# Patient Record
Sex: MALE | Race: WHITE | NOT HISPANIC OR LATINO | Employment: FULL TIME | URBAN - METROPOLITAN AREA
[De-identification: names, ages, dates, MRNs, and addresses within clinical notes are randomized per-mention and may not be internally consistent; named-entity substitution may affect disease eponyms.]

---

## 2018-01-17 ENCOUNTER — TRANSFERRED RECORDS (OUTPATIENT)
Dept: HEALTH INFORMATION MANAGEMENT | Facility: CLINIC | Age: 63
End: 2018-01-17

## 2018-01-19 DIAGNOSIS — F51.3 SLEEP WALKING DISORDER: ICD-10-CM

## 2018-01-19 DIAGNOSIS — R06.83 SNORING: ICD-10-CM

## 2018-01-19 DIAGNOSIS — E66.9 OBESITY: Primary | ICD-10-CM

## 2018-01-30 ENCOUNTER — TELEPHONE (OUTPATIENT)
Dept: SLEEP MEDICINE | Facility: CLINIC | Age: 63
End: 2018-01-30

## 2018-01-30 NOTE — TELEPHONE ENCOUNTER
Called patient and left another voice mail today. He needs to be scheduled with Dr. Hidalgo due to his diagnosis of sleep walking. Dr. Hidalgo is the person who he needs to see. Patient has been contacted two or three times.     Farrah Willson

## 2019-10-17 ENCOUNTER — HOSPITAL ENCOUNTER (EMERGENCY)
Facility: CLINIC | Age: 64
Discharge: HOME | End: 2019-10-17
Attending: EMERGENCY MEDICINE | Admitting: EMERGENCY MEDICINE
Payer: COMMERCIAL

## 2019-10-17 ENCOUNTER — APPOINTMENT (OUTPATIENT)
Dept: GENERAL RADIOLOGY | Facility: CLINIC | Age: 64
End: 2019-10-17
Attending: EMERGENCY MEDICINE
Payer: COMMERCIAL

## 2019-10-17 VITALS
TEMPERATURE: 97.8 F | HEIGHT: 74 IN | SYSTOLIC BLOOD PRESSURE: 151 MMHG | BODY MASS INDEX: 29.52 KG/M2 | DIASTOLIC BLOOD PRESSURE: 89 MMHG | RESPIRATION RATE: 9 BRPM | HEART RATE: 60 BPM | OXYGEN SATURATION: 97 % | WEIGHT: 230 LBS

## 2019-10-17 DIAGNOSIS — R42 DIZZINESS: ICD-10-CM

## 2019-10-17 DIAGNOSIS — R42 LIGHTHEADEDNESS: ICD-10-CM

## 2019-10-17 LAB
ALBUMIN SERPL-MCNC: 4.1 G/DL (ref 3.4–5)
ALP SERPL-CCNC: 84 U/L (ref 40–150)
ALT SERPL W P-5'-P-CCNC: 41 U/L (ref 0–70)
ANION GAP SERPL CALCULATED.3IONS-SCNC: 6 MMOL/L (ref 3–14)
AST SERPL W P-5'-P-CCNC: 22 U/L (ref 0–45)
BASOPHILS # BLD AUTO: 0 10E9/L (ref 0–0.2)
BASOPHILS NFR BLD AUTO: 0.2 %
BILIRUB SERPL-MCNC: 0.9 MG/DL (ref 0.2–1.3)
BUN SERPL-MCNC: 17 MG/DL (ref 7–30)
CALCIUM SERPL-MCNC: 9.3 MG/DL (ref 8.5–10.1)
CHLORIDE SERPL-SCNC: 106 MMOL/L (ref 94–109)
CO2 SERPL-SCNC: 26 MMOL/L (ref 20–32)
CREAT SERPL-MCNC: 0.84 MG/DL (ref 0.66–1.25)
DIFFERENTIAL METHOD BLD: NORMAL
EOSINOPHIL # BLD AUTO: 0.1 10E9/L (ref 0–0.7)
EOSINOPHIL NFR BLD AUTO: 1.2 %
ERYTHROCYTE [DISTWIDTH] IN BLOOD BY AUTOMATED COUNT: 12.2 % (ref 10–15)
GFR SERPL CREATININE-BSD FRML MDRD: >90 ML/MIN/{1.73_M2}
GLUCOSE SERPL-MCNC: 160 MG/DL (ref 70–99)
HCT VFR BLD AUTO: 49 % (ref 40–53)
HGB BLD-MCNC: 17.2 G/DL (ref 13.3–17.7)
IMM GRANULOCYTES # BLD: 0 10E9/L (ref 0–0.4)
IMM GRANULOCYTES NFR BLD: 0.2 %
INTERPRETATION ECG - MUSE: NORMAL
LYMPHOCYTES # BLD AUTO: 2.2 10E9/L (ref 0.8–5.3)
LYMPHOCYTES NFR BLD AUTO: 33.7 %
MCH RBC QN AUTO: 32.9 PG (ref 26.5–33)
MCHC RBC AUTO-ENTMCNC: 35.1 G/DL (ref 31.5–36.5)
MCV RBC AUTO: 94 FL (ref 78–100)
MONOCYTES # BLD AUTO: 0.5 10E9/L (ref 0–1.3)
MONOCYTES NFR BLD AUTO: 6.8 %
NEUTROPHILS # BLD AUTO: 3.9 10E9/L (ref 1.6–8.3)
NEUTROPHILS NFR BLD AUTO: 57.9 %
NRBC # BLD AUTO: 0 10*3/UL
NRBC BLD AUTO-RTO: 0 /100
NT-PROBNP SERPL-MCNC: 26 PG/ML (ref 0–900)
PLATELET # BLD AUTO: 251 10E9/L (ref 150–450)
POTASSIUM SERPL-SCNC: 3.8 MMOL/L (ref 3.4–5.3)
PROT SERPL-MCNC: 7.7 G/DL (ref 6.8–8.8)
RBC # BLD AUTO: 5.23 10E12/L (ref 4.4–5.9)
SODIUM SERPL-SCNC: 138 MMOL/L (ref 133–144)
TROPONIN I SERPL-MCNC: <0.015 UG/L (ref 0–0.04)
WBC # BLD AUTO: 6.6 10E9/L (ref 4–11)

## 2019-10-17 PROCEDURE — 80053 COMPREHEN METABOLIC PANEL: CPT | Performed by: EMERGENCY MEDICINE

## 2019-10-17 PROCEDURE — 96360 HYDRATION IV INFUSION INIT: CPT

## 2019-10-17 PROCEDURE — 84484 ASSAY OF TROPONIN QUANT: CPT | Performed by: EMERGENCY MEDICINE

## 2019-10-17 PROCEDURE — 83880 ASSAY OF NATRIURETIC PEPTIDE: CPT | Performed by: EMERGENCY MEDICINE

## 2019-10-17 PROCEDURE — 25000128 H RX IP 250 OP 636: Performed by: EMERGENCY MEDICINE

## 2019-10-17 PROCEDURE — 71046 X-RAY EXAM CHEST 2 VIEWS: CPT

## 2019-10-17 PROCEDURE — 85025 COMPLETE CBC W/AUTO DIFF WBC: CPT | Performed by: EMERGENCY MEDICINE

## 2019-10-17 PROCEDURE — 99285 EMERGENCY DEPT VISIT HI MDM: CPT | Mod: 25

## 2019-10-17 PROCEDURE — 93005 ELECTROCARDIOGRAM TRACING: CPT

## 2019-10-17 RX ADMIN — SODIUM CHLORIDE 1000 ML: 9 INJECTION, SOLUTION INTRAVENOUS at 10:50

## 2019-10-17 ASSESSMENT — ENCOUNTER SYMPTOMS
DYSURIA: 0
ABDOMINAL PAIN: 0
DIZZINESS: 1
NUMBNESS: 0
LIGHT-HEADEDNESS: 1
NERVOUS/ANXIOUS: 1
PALPITATIONS: 0
VOMITING: 0
BLOOD IN STOOL: 0
FREQUENCY: 0
DIARRHEA: 0
FEVER: 0
HEMATURIA: 0
DIFFICULTY URINATING: 0

## 2019-10-17 ASSESSMENT — MIFFLIN-ST. JEOR: SCORE: 1898.02

## 2019-10-17 NOTE — ED NOTES
Pt hadto go to the bathroom - wife walked with him in the [place]. Pt stated he would be fine to walk to the bathroom, he came in independently  and has shoes on.

## 2019-10-17 NOTE — ED AVS SNAPSHOT
Emergency Department  [address] [place] [id number]  Phone:  [phone]  Fax:  [phone]                                    Castillo Alba   MRN: [id number]    Department:   Emergency Department   Date of Visit:  10/17/2019           After Visit Summary Signature Page    I have received my discharge instructions, and my questions have been answered. I have discussed any challenges I see with this plan with the nurse or doctor.    ..........................................................................................................................................  Patient/Patient Representative Signature      ..........................................................................................................................................  Patient Representative Print Name and Relationship to Patient    ..................................................               ................................................  Date                                   Time    ..........................................................................................................................................  Reviewed by Signature/Title    ...................................................              ..............................................  Date                                               Time          22EPIC Rev 08/18

## 2019-10-17 NOTE — ED NOTES
Pt stated he has been dealing with this dizzy almost faint feeling on and off for the last year and it has gotten worse over the last 3 months.

## 2019-10-17 NOTE — ED PROVIDER NOTES
History     Chief Complaint:  Dizziness    The history is provided by the patient.      Castillo Alba is a 64 year old male who presents for dizziness. The patient reports that for the last year he has had dizziness, and feeling faint and going to fall over. He denies the room spinning. He states that for the last 3 months he has had increased frequency and for the last couple weeks he has had the episodes every other day. The patient's wife states that these episodes have been getting a lot worse for the last 5 days. These episodes last for around a hour at a time. He denies any palpitations, chest pain, numbing, or tingling before or during this episodes. Laying down makes him feel better. He states that he is starting a new job and he has had a lot more anxiety. He denies any fever, abdominal pain, blood in his stool, diarrhea, vomiting, urinary abnormalities, and rashes.     Allergies:  No known drug allergies.    Medications:    The patient is not currently taking any prescribed medications.    Past Medical History:    History reviewed.  No significant past medical history.     Past Surgical History:    History reviewed. No pertinent past surgical history.    Family History:    History reviewed. No pertinent family history.    Social History:  Patient is   PCP: Physician No Ref-Primary     Review of Systems   Constitutional: Negative for fever.   Cardiovascular: Negative for chest pain and palpitations.   Gastrointestinal: Negative for abdominal pain, blood in stool, diarrhea and vomiting.   Genitourinary: Negative for difficulty urinating, dysuria, frequency and hematuria.   Skin: Negative for rash.   Neurological: Positive for dizziness and light-headedness. Negative for numbness.   Psychiatric/Behavioral: The patient is nervous/anxious.    All other systems reviewed and are negative.    Physical Exam   First Vitals:  Patient Vitals for the past 24 hrs:   BP Temp Temp src Pulse Heart Rate Resp  "SpO2 Height Weight   10/17/19 1158 (!) 151/89 -- -- 60 -- -- -- -- --   10/17/19 1152 -- -- -- -- -- -- 97 % -- --   10/17/19 1150 (!) 151/89 -- -- 60 -- -- -- -- --   10/17/19 1101 (!) 143/74 -- -- 66 57 9 95 % -- --   10/17/19 1100 (!) 143/74 -- -- 66 58 10 93 % -- --   10/17/19 1000 (!) 156/87 -- -- 60 -- -- 97 % -- --   10/17/19 0924 (!) 165/94 97.8  F (36.6  C) Oral 83 -- 18 100 % 1.88 m (6' 2\") 104.3 kg (230 lb)     Physical Exam  Vitals: reviewed by me  General: Pt seen on [facility], cooperative, and alert to conversation  Eyes: Tracking well, clear conjunctiva BL  ENT: MMM, midline trachea.   Lungs:   No tachypnea, no accessory muscle use. No respiratory distress.   CV: Rate as above, regular rhythm.  No MGR on auscultation   Abd: Soft, non tender, no guarding, no rebound. Non distended  MSK: no peripheral edema or joint effusion.  No evidence of trauma  Skin: No rash, normal turgor and temperature  Neuro: Clear speech and no facial droop.  Psych: Not RIS, no e/o AH/VH      Emergency Department Course   ECG:  @ 0929  Indication: Dizziness  Vent. Rate 105 bpm. CO interval 174 ms. QRS duration 82 ms. QT/QTc 380/502 ms. P-R-T axis 49 25 33.   Sinus tachycardia with frequent premature ventricular complexes in a patter of bigeminy. Low voltage QRS. Septal infarct, age undetermined. Bigeminy. Abnormal ECG.    Read by Dr. Martinez.    Imaging:  Radiographic findings were communicated with the patient who voiced understanding of the findings.  XR chest 2 views:  Enlarged cardiomediastinal silhouette with tortuous  descending thoracic aorta. Bibasilar subsegmental atelectasis. Mild  pulmonary vascular congestion. Multilevel degenerative change is seen  in the spine. Per radiology read.    Laboratory:  CBC:  WBC 6.6, HGB 17.2,   CMP: Glucose 160 high, o/w WNL. (Creatinine 0.84)  Troponin: <0.015  BNP: 26    Interventions:  1050: NS 1L IV    Emergency Department Course:  9:38 AM Nursing notes " and vitals reviewed.  I performed an exam of the patient as documented above.     11:45 AM Findings and plan explained to the patient. Patient discharged home with instructions regarding supportive care, medications, and reasons to return. The importance of close follow-up was reviewed.     Impression & Plan      Medical Decision Making:  Castillo Alba is a very pleasant 64 year old male who presents to the emergency room with what appears to be dizziness of unknown origin. He has had this for about a year, worse for the last 3 months, and he has a normal workup here in the ER. He has a normal hemoglobin, no evidence of dehydration, no vertigo, and a negative cardiac workup as well. He has had no events on the cardiac monitors here. He has a normal neurologic exam, and he thinks this may be related to sleep apnea. I do think he is stable for outpatient management, however I underscored how no clear cause was found and he does have to follow with a regular physician at regular intervals.     Diagnosis:    ICD-10-CM    1. Dizziness R42    2. Lightheadedness R42        Disposition:  discharged to home    I, Bradley Aasen, am serving as a scribe on 10/17/2019 at 9:38 AM to personally document services performed by Rusty Martinez MD based on my observations and the provider's statements to me.          Rusty Martinez MD  10/17/19 1549       Rusty Martinez MD  10/17/19 1540

## 2019-10-17 NOTE — DISCHARGE INSTRUCTIONS
As we discussed, no clear cause for your lightheadedness was found today.  Please do go to the primary care doctor given to you in this paperwork, if you do not have your own.  It is very important that you follow with her regular doctor next week, as no clear cause for your dizziness was found.  Please come back to the ER with any other concerns.

## 2022-12-12 ENCOUNTER — HOSPITAL ENCOUNTER (OUTPATIENT)
Facility: CLINIC | Age: 67
End: 2022-12-12
Attending: ORTHOPAEDIC SURGERY | Admitting: ORTHOPAEDIC SURGERY
Payer: COMMERCIAL

## 2023-11-13 ENCOUNTER — HOSPITAL ENCOUNTER (OUTPATIENT)
Facility: CLINIC | Age: 68
End: 2023-11-13
Attending: ORTHOPAEDIC SURGERY | Admitting: ORTHOPAEDIC SURGERY
Payer: COMMERCIAL

## 2024-02-18 ENCOUNTER — APPOINTMENT (OUTPATIENT)
Dept: CT IMAGING | Facility: CLINIC | Age: 69
DRG: 391 | End: 2024-02-18
Attending: EMERGENCY MEDICINE
Payer: COMMERCIAL

## 2024-02-18 ENCOUNTER — HOSPITAL ENCOUNTER (INPATIENT)
Facility: CLINIC | Age: 69
LOS: 7 days | Discharge: HOME | DRG: 391 | End: 2024-02-25
Attending: EMERGENCY MEDICINE | Admitting: INTERNAL MEDICINE
Payer: COMMERCIAL

## 2024-02-18 DIAGNOSIS — Z22.1 CLOSTRIDIUM DIFFICILE CARRIER: ICD-10-CM

## 2024-02-18 DIAGNOSIS — A41.9 ACUTE SEPSIS (H): ICD-10-CM

## 2024-02-18 DIAGNOSIS — K57.81: Primary | ICD-10-CM

## 2024-02-18 DIAGNOSIS — K57.20 DIVERTICULITIS OF COLON WITH PERFORATION: ICD-10-CM

## 2024-02-18 DIAGNOSIS — K57.20 ABSCESS OF SIGMOID COLON DUE TO DIVERTICULITIS: ICD-10-CM

## 2024-02-18 PROBLEM — R82.81 PYURIA: Status: ACTIVE | Noted: 2024-02-18

## 2024-02-18 PROBLEM — F17.200 TOBACCO USE DISORDER: Status: ACTIVE | Noted: 2024-02-18

## 2024-02-18 PROBLEM — E87.1 HYPONATREMIA: Status: ACTIVE | Noted: 2024-02-18

## 2024-02-18 PROBLEM — R82.81 PYURIA: Chronic | Status: ACTIVE | Noted: 2024-02-18

## 2024-02-18 PROBLEM — D75.839 THROMBOCYTHEMIA: Status: ACTIVE | Noted: 2024-02-18

## 2024-02-18 PROBLEM — D72.829 LEUKOCYTOSIS: Status: ACTIVE | Noted: 2024-02-18

## 2024-02-18 LAB
ADV 40+41 DNA STL QL NAA+NON-PROBE: NEGATIVE
ALBUMIN SERPL BCG-MCNC: 3.1 G/DL (ref 3.5–5.2)
ALBUMIN UR-MCNC: 200 MG/DL
ALP SERPL-CCNC: 146 U/L (ref 40–150)
ALT SERPL W P-5'-P-CCNC: 30 U/L (ref 0–70)
ANION GAP SERPL CALCULATED.3IONS-SCNC: 12 MMOL/L (ref 7–15)
APPEARANCE UR: ABNORMAL
AST SERPL W P-5'-P-CCNC: 29 U/L (ref 0–45)
ASTRO TYP 1-8 RNA STL QL NAA+NON-PROBE: NEGATIVE
BASOPHILS # BLD AUTO: 0 10E3/UL (ref 0–0.2)
BASOPHILS NFR BLD AUTO: 0 %
BILIRUB SERPL-MCNC: 0.6 MG/DL
BILIRUB UR QL STRIP: ABNORMAL
BUN SERPL-MCNC: 10.2 MG/DL (ref 8–23)
C CAYETANENSIS DNA STL QL NAA+NON-PROBE: NEGATIVE
C DIFF GDH STL QL IA: NEGATIVE
C DIFF TOX A+B STL QL IA: NEGATIVE
C DIFF TOX B STL QL: POSITIVE
CALCIUM SERPL-MCNC: 8.8 MG/DL (ref 8.8–10.2)
CAMPYLOBACTER DNA SPEC NAA+PROBE: NEGATIVE
CHLORIDE SERPL-SCNC: 92 MMOL/L (ref 98–107)
COLOR UR AUTO: ABNORMAL
CREAT SERPL-MCNC: 0.89 MG/DL (ref 0.67–1.17)
CRYPTOSP DNA STL QL NAA+NON-PROBE: NEGATIVE
DEPRECATED HCO3 PLAS-SCNC: 27 MMOL/L (ref 22–29)
E COLI O157 DNA STL QL NAA+NON-PROBE: NORMAL
E HISTOLYT DNA STL QL NAA+NON-PROBE: NEGATIVE
EAEC ASTA GENE ISLT QL NAA+PROBE: NEGATIVE
EC STX1+STX2 GENES STL QL NAA+NON-PROBE: NEGATIVE
EGFRCR SERPLBLD CKD-EPI 2021: >90 ML/MIN/1.73M2
EOSINOPHIL # BLD AUTO: 0 10E3/UL (ref 0–0.7)
EOSINOPHIL NFR BLD AUTO: 0 %
EPEC EAE GENE STL QL NAA+NON-PROBE: NEGATIVE
ERYTHROCYTE [DISTWIDTH] IN BLOOD BY AUTOMATED COUNT: 12.7 % (ref 10–15)
ETEC LTA+ST1A+ST1B TOX ST NAA+NON-PROBE: NEGATIVE
G LAMBLIA DNA STL QL NAA+NON-PROBE: NEGATIVE
GLUCOSE SERPL-MCNC: 175 MG/DL (ref 70–99)
GLUCOSE UR STRIP-MCNC: 50 MG/DL
HCT VFR BLD AUTO: 41.4 % (ref 40–53)
HGB BLD-MCNC: 14 G/DL (ref 13.3–17.7)
HGB UR QL STRIP: ABNORMAL
HOLD SPECIMEN: NORMAL
HYALINE CASTS: 67 /LPF
IMM GRANULOCYTES # BLD: 0.1 10E3/UL
IMM GRANULOCYTES NFR BLD: 1 %
KETONES UR STRIP-MCNC: ABNORMAL MG/DL
LACTATE SERPL-SCNC: 1.2 MMOL/L (ref 0.7–2)
LEUKOCYTE ESTERASE UR QL STRIP: ABNORMAL
LYMPHOCYTES # BLD AUTO: 2.1 10E3/UL (ref 0.8–5.3)
LYMPHOCYTES NFR BLD AUTO: 13 %
MCH RBC QN AUTO: 29.4 PG (ref 26.5–33)
MCHC RBC AUTO-ENTMCNC: 33.8 G/DL (ref 31.5–36.5)
MCV RBC AUTO: 87 FL (ref 78–100)
MONOCYTES # BLD AUTO: 1.3 10E3/UL (ref 0–1.3)
MONOCYTES NFR BLD AUTO: 8 %
MUCOUS THREADS #/AREA URNS LPF: PRESENT /LPF
NEUTROPHILS # BLD AUTO: 12.4 10E3/UL (ref 1.6–8.3)
NEUTROPHILS NFR BLD AUTO: 78 %
NITRATE UR QL: NEGATIVE
NOROVIRUS GI+II RNA STL QL NAA+NON-PROBE: NEGATIVE
NRBC # BLD AUTO: 0 10E3/UL
NRBC BLD AUTO-RTO: 0 /100
P SHIGELLOIDES DNA STL QL NAA+NON-PROBE: NEGATIVE
PH UR STRIP: 5.5 [PH] (ref 5–7)
PLATELET # BLD AUTO: 581 10E3/UL (ref 150–450)
POTASSIUM SERPL-SCNC: 3.5 MMOL/L (ref 3.4–5.3)
PROT SERPL-MCNC: 7.2 G/DL (ref 6.4–8.3)
RADIOLOGIST FLAGS: ABNORMAL
RBC # BLD AUTO: 4.76 10E6/UL (ref 4.4–5.9)
RBC URINE: 35 /HPF
RVA RNA STL QL NAA+NON-PROBE: NEGATIVE
SALMONELLA SP RPOD STL QL NAA+PROBE: NEGATIVE
SAPO I+II+IV+V RNA STL QL NAA+NON-PROBE: NEGATIVE
SHIGELLA SP+EIEC IPAH ST NAA+NON-PROBE: NEGATIVE
SODIUM SERPL-SCNC: 131 MMOL/L (ref 135–145)
SP GR UR STRIP: 1.03 (ref 1–1.03)
SQUAMOUS EPITHELIAL: 5 /HPF
UROBILINOGEN UR STRIP-MCNC: 8 MG/DL
V CHOLERAE DNA SPEC QL NAA+PROBE: NEGATIVE
VIBRIO DNA SPEC NAA+PROBE: NEGATIVE
WBC # BLD AUTO: 16 10E3/UL (ref 4–11)
WBC URINE: 42 /HPF
Y ENTEROCOL DNA STL QL NAA+PROBE: NEGATIVE

## 2024-02-18 PROCEDURE — 87507 IADNA-DNA/RNA PROBE TQ 12-25: CPT | Performed by: EMERGENCY MEDICINE

## 2024-02-18 PROCEDURE — 74177 CT ABD & PELVIS W/CONTRAST: CPT

## 2024-02-18 PROCEDURE — 85025 COMPLETE CBC W/AUTO DIFF WBC: CPT | Performed by: SOCIAL WORKER

## 2024-02-18 PROCEDURE — 87040 BLOOD CULTURE FOR BACTERIA: CPT | Performed by: EMERGENCY MEDICINE

## 2024-02-18 PROCEDURE — 250N000011 HC RX IP 250 OP 636: Performed by: EMERGENCY MEDICINE

## 2024-02-18 PROCEDURE — 85025 COMPLETE CBC W/AUTO DIFF WBC: CPT | Performed by: EMERGENCY MEDICINE

## 2024-02-18 PROCEDURE — 87086 URINE CULTURE/COLONY COUNT: CPT | Performed by: EMERGENCY MEDICINE

## 2024-02-18 PROCEDURE — 120N000001 HC R&B MED SURG/OB

## 2024-02-18 PROCEDURE — 258N000003 HC RX IP 258 OP 636: Performed by: INTERNAL MEDICINE

## 2024-02-18 PROCEDURE — 80053 COMPREHEN METABOLIC PANEL: CPT | Performed by: SOCIAL WORKER

## 2024-02-18 PROCEDURE — 80053 COMPREHEN METABOLIC PANEL: CPT | Performed by: EMERGENCY MEDICINE

## 2024-02-18 PROCEDURE — 87324 CLOSTRIDIUM AG IA: CPT | Performed by: EMERGENCY MEDICINE

## 2024-02-18 PROCEDURE — 250N000011 HC RX IP 250 OP 636: Performed by: INTERNAL MEDICINE

## 2024-02-18 PROCEDURE — 250N000009 HC RX 250: Performed by: EMERGENCY MEDICINE

## 2024-02-18 PROCEDURE — 96360 HYDRATION IV INFUSION INIT: CPT

## 2024-02-18 PROCEDURE — 83605 ASSAY OF LACTIC ACID: CPT | Performed by: EMERGENCY MEDICINE

## 2024-02-18 PROCEDURE — 36415 COLL VENOUS BLD VENIPUNCTURE: CPT | Performed by: SOCIAL WORKER

## 2024-02-18 PROCEDURE — 87493 C DIFF AMPLIFIED PROBE: CPT | Performed by: EMERGENCY MEDICINE

## 2024-02-18 PROCEDURE — 36415 COLL VENOUS BLD VENIPUNCTURE: CPT | Performed by: EMERGENCY MEDICINE

## 2024-02-18 PROCEDURE — 258N000003 HC RX IP 258 OP 636: Performed by: EMERGENCY MEDICINE

## 2024-02-18 PROCEDURE — 81001 URINALYSIS AUTO W/SCOPE: CPT | Performed by: EMERGENCY MEDICINE

## 2024-02-18 PROCEDURE — 99285 EMERGENCY DEPT VISIT HI MDM: CPT | Mod: 25

## 2024-02-18 PROCEDURE — 99223 1ST HOSP IP/OBS HIGH 75: CPT | Performed by: INTERNAL MEDICINE

## 2024-02-18 RX ORDER — HYDROMORPHONE HCL IN WATER/PF 6 MG/30 ML
0.2 PATIENT CONTROLLED ANALGESIA SYRINGE INTRAVENOUS
Status: DISCONTINUED | OUTPATIENT
Start: 2024-02-18 | End: 2024-02-25 | Stop reason: HOSPADM

## 2024-02-18 RX ORDER — OXYCODONE HYDROCHLORIDE 5 MG/1
5 TABLET ORAL EVERY 4 HOURS PRN
Status: DISCONTINUED | OUTPATIENT
Start: 2024-02-18 | End: 2024-02-25 | Stop reason: HOSPADM

## 2024-02-18 RX ORDER — PIPERACILLIN SODIUM, TAZOBACTAM SODIUM 4; .5 G/20ML; G/20ML
4.5 INJECTION, POWDER, LYOPHILIZED, FOR SOLUTION INTRAVENOUS ONCE
Status: DISCONTINUED | OUTPATIENT
Start: 2024-02-18 | End: 2024-02-18

## 2024-02-18 RX ORDER — AMOXICILLIN 250 MG
1 CAPSULE ORAL 2 TIMES DAILY PRN
Status: DISCONTINUED | OUTPATIENT
Start: 2024-02-18 | End: 2024-02-25 | Stop reason: HOSPADM

## 2024-02-18 RX ORDER — IOPAMIDOL 755 MG/ML
131 INJECTION, SOLUTION INTRAVASCULAR ONCE
Status: COMPLETED | OUTPATIENT
Start: 2024-02-18 | End: 2024-02-18

## 2024-02-18 RX ORDER — VANCOMYCIN HYDROCHLORIDE 125 MG/1
125 CAPSULE ORAL 2 TIMES DAILY
Status: DISCONTINUED | OUTPATIENT
Start: 2024-02-18 | End: 2024-02-25 | Stop reason: HOSPADM

## 2024-02-18 RX ORDER — HYDROMORPHONE HCL IN WATER/PF 6 MG/30 ML
0.4 PATIENT CONTROLLED ANALGESIA SYRINGE INTRAVENOUS
Status: DISCONTINUED | OUTPATIENT
Start: 2024-02-18 | End: 2024-02-25 | Stop reason: HOSPADM

## 2024-02-18 RX ORDER — PIPERACILLIN SODIUM, TAZOBACTAM SODIUM 4; .5 G/20ML; G/20ML
4.5 INJECTION, POWDER, LYOPHILIZED, FOR SOLUTION INTRAVENOUS EVERY 6 HOURS
Status: DISCONTINUED | OUTPATIENT
Start: 2024-02-18 | End: 2024-02-20

## 2024-02-18 RX ORDER — CALCIUM CARBONATE 500 MG/1
1000 TABLET, CHEWABLE ORAL 4 TIMES DAILY PRN
Status: DISCONTINUED | OUTPATIENT
Start: 2024-02-18 | End: 2024-02-25 | Stop reason: HOSPADM

## 2024-02-18 RX ORDER — ACETAMINOPHEN 325 MG/1
650 TABLET ORAL EVERY 4 HOURS PRN
Status: DISCONTINUED | OUTPATIENT
Start: 2024-02-18 | End: 2024-02-25 | Stop reason: HOSPADM

## 2024-02-18 RX ORDER — PROCHLORPERAZINE 25 MG
12.5 SUPPOSITORY, RECTAL RECTAL EVERY 12 HOURS PRN
Status: DISCONTINUED | OUTPATIENT
Start: 2024-02-18 | End: 2024-02-25 | Stop reason: HOSPADM

## 2024-02-18 RX ORDER — SODIUM CHLORIDE 9 MG/ML
INJECTION, SOLUTION INTRAVENOUS CONTINUOUS
Status: DISCONTINUED | OUTPATIENT
Start: 2024-02-18 | End: 2024-02-20

## 2024-02-18 RX ORDER — NALOXONE HYDROCHLORIDE 0.4 MG/ML
0.2 INJECTION, SOLUTION INTRAMUSCULAR; INTRAVENOUS; SUBCUTANEOUS
Status: DISCONTINUED | OUTPATIENT
Start: 2024-02-18 | End: 2024-02-25 | Stop reason: HOSPADM

## 2024-02-18 RX ORDER — ONDANSETRON 2 MG/ML
4 INJECTION INTRAMUSCULAR; INTRAVENOUS EVERY 6 HOURS PRN
Status: DISCONTINUED | OUTPATIENT
Start: 2024-02-18 | End: 2024-02-25 | Stop reason: HOSPADM

## 2024-02-18 RX ORDER — AMOXICILLIN 250 MG
2 CAPSULE ORAL 2 TIMES DAILY PRN
Status: DISCONTINUED | OUTPATIENT
Start: 2024-02-18 | End: 2024-02-25 | Stop reason: HOSPADM

## 2024-02-18 RX ORDER — ACETAMINOPHEN 650 MG/1
650 SUPPOSITORY RECTAL EVERY 4 HOURS PRN
Status: DISCONTINUED | OUTPATIENT
Start: 2024-02-18 | End: 2024-02-25 | Stop reason: HOSPADM

## 2024-02-18 RX ORDER — OXYCODONE HYDROCHLORIDE 5 MG/1
10 TABLET ORAL EVERY 4 HOURS PRN
Status: DISCONTINUED | OUTPATIENT
Start: 2024-02-18 | End: 2024-02-25 | Stop reason: HOSPADM

## 2024-02-18 RX ORDER — NALOXONE HYDROCHLORIDE 0.4 MG/ML
0.4 INJECTION, SOLUTION INTRAMUSCULAR; INTRAVENOUS; SUBCUTANEOUS
Status: DISCONTINUED | OUTPATIENT
Start: 2024-02-18 | End: 2024-02-25 | Stop reason: HOSPADM

## 2024-02-18 RX ORDER — LIDOCAINE 40 MG/G
CREAM TOPICAL
Status: DISCONTINUED | OUTPATIENT
Start: 2024-02-18 | End: 2024-02-25 | Stop reason: HOSPADM

## 2024-02-18 RX ORDER — PROCHLORPERAZINE MALEATE 5 MG
5 TABLET ORAL EVERY 6 HOURS PRN
Status: DISCONTINUED | OUTPATIENT
Start: 2024-02-18 | End: 2024-02-25 | Stop reason: HOSPADM

## 2024-02-18 RX ORDER — HYDROMORPHONE HYDROCHLORIDE 1 MG/ML
0.5 INJECTION, SOLUTION INTRAMUSCULAR; INTRAVENOUS; SUBCUTANEOUS EVERY 30 MIN PRN
Status: DISCONTINUED | OUTPATIENT
Start: 2024-02-18 | End: 2024-02-18 | Stop reason: DRUGHIGH

## 2024-02-18 RX ORDER — ONDANSETRON 4 MG/1
4 TABLET, ORALLY DISINTEGRATING ORAL EVERY 6 HOURS PRN
Status: DISCONTINUED | OUTPATIENT
Start: 2024-02-18 | End: 2024-02-25 | Stop reason: HOSPADM

## 2024-02-18 RX ADMIN — PIPERACILLIN AND TAZOBACTAM 4.5 G: 4; .5 INJECTION, POWDER, FOR SOLUTION INTRAVENOUS at 18:56

## 2024-02-18 RX ADMIN — SODIUM CHLORIDE 1000 ML: 9 INJECTION, SOLUTION INTRAVENOUS at 16:18

## 2024-02-18 RX ADMIN — SODIUM CHLORIDE 77 ML: 9 INJECTION, SOLUTION INTRAVENOUS at 17:33

## 2024-02-18 RX ADMIN — SODIUM CHLORIDE 1000 ML: 9 INJECTION, SOLUTION INTRAVENOUS at 18:56

## 2024-02-18 RX ADMIN — PIPERACILLIN AND TAZOBACTAM 4.5 G: 4; .5 INJECTION, POWDER, FOR SOLUTION INTRAVENOUS at 23:58

## 2024-02-18 RX ADMIN — IOPAMIDOL 131 ML: 755 INJECTION, SOLUTION INTRAVENOUS at 17:34

## 2024-02-18 RX ADMIN — SODIUM CHLORIDE: 9 INJECTION, SOLUTION INTRAVENOUS at 20:28

## 2024-02-18 ASSESSMENT — ACTIVITIES OF DAILY LIVING (ADL)
ADLS_ACUITY_SCORE: 21
ADLS_ACUITY_SCORE: 35
ADLS_ACUITY_SCORE: 35
ADLS_ACUITY_SCORE: 21

## 2024-02-18 NOTE — ED TRIAGE NOTES
Pt ambulated into ED complaining of abdominal pain in LLQ. States he doesn't have an appetite for past two weeks. Pt denies N/V & blood in stool. Pt denies fever. States he's been having explosive diarrhea for past few weeks. He feels he is not eating enough and gets light headed. Pt has hx of diverticulitis.

## 2024-02-18 NOTE — ED PROVIDER NOTES
History     Chief Complaint:  Abdominal Pain       HPI   Castillo Alba is a 68 year old male presents to the emergency room with abdominal pain. The patient reports that he had a tooth infection in that was drained in November. He was given to different antibiotics as the first on was not working for him. The patient then states that he got C-dif in December. Since hen he has consistent diarrhea. He now has chills and a low grade fever. He denies vomiting, nausea, abdominal surgery, an appetite, blood in stool, issues urinating or recant travel.        Independent Historian:    none    Review of External Notes:  none    Medications:    Celebrex    Past Medical History:    Diverticulitis  C. difficile infection  Dental abscess    Past Surgical History:    No past surgical history on file.       Physical Exam   Patient Vitals for the past 24 hrs:   BP Temp Temp src Pulse Resp SpO2 Height Weight   02/18/24 1617 -- -- -- -- -- 98 % -- --   02/18/24 1616 135/59 -- -- 70 -- -- -- --   02/18/24 1359 122/68 98.5  F (36.9  C) Temporal 79 16 99 % 1.829 m (6') 117.9 kg (260 lb)        Physical Exam  Nursing note and vitals reviewed.  Constitutional:  Appears well-developed and well-nourished.   HENT:   Head:    Atraumatic.   Mouth/Throat:   Oropharynx is clear and moist. No oropharyngeal exudate.  Teeth are nontender without gingival swelling.  Eyes:    Pupils are equal, round, and reactive to light.   Neck:    Normal range of motion. Neck supple.      No tracheal deviation present. No thyromegaly present.   Cardiovascular:  Normal rate, regular rhythm, no murmur   Pulmonary/Chest: Breath sounds are clear and equal without wheezes or crackles.  Abdominal:   Soft. Bowel sounds are normal. Exhibits no distension and      no mass. There is tenderness primarily in the left lower quadrant with guarding, but no rebound.  Musculoskeletal:  Exhibits no edema.   Lymphadenopathy:  No cervical adenopathy.   Neurological:   Alert  and oriented to person, place, and time.   Skin:    Skin is warm and dry. No rash noted. No pallor.      Emergency Department Course     Imaging:  CT Abdomen Pelvis w Contrast   Final Result   Abnormal   IMPRESSION:    1.  Acute sigmoid diverticulitis complicated by perforation with an adjacent gas containing fluid collection, most likely abscess.      2.  Localized wall thickening of the urinary bladder, likely secondary to the adjacent abscess. No intraluminal gas to suggest colovesical fistula.      [Critical Result: Perforated viscus]      Finding was identified on 2/18/2024 5:57 PM CST.       Dr. Vivian Montes was contacted by me on 2/18/2024 6:02 PM CST and verbalized understanding of the critical result.        Report per radiology    Laboratory:  Labs Ordered and Resulted from Time of ED Arrival to Time of ED Departure   COMPREHENSIVE METABOLIC PANEL - Abnormal       Result Value    Sodium 131 (*)     Potassium 3.5      Carbon Dioxide (CO2) 27      Anion Gap 12      Urea Nitrogen 10.2      Creatinine 0.89      GFR Estimate >90      Calcium 8.8      Chloride 92 (*)     Glucose 175 (*)     Alkaline Phosphatase 146      AST 29      ALT 30      Protein Total 7.2      Albumin 3.1 (*)     Bilirubin Total 0.6     CBC WITH PLATELETS AND DIFFERENTIAL - Abnormal    WBC Count 16.0 (*)     RBC Count 4.76      Hemoglobin 14.0      Hematocrit 41.4      MCV 87      MCH 29.4      MCHC 33.8      RDW 12.7      Platelet Count 581 (*)     % Neutrophils 78      % Lymphocytes 13      % Monocytes 8      % Eosinophils 0      % Basophils 0      % Immature Granulocytes 1      NRBCs per 100 WBC 0      Absolute Neutrophils 12.4 (*)     Absolute Lymphocytes 2.1      Absolute Monocytes 1.3      Absolute Eosinophils 0.0      Absolute Basophils 0.0      Absolute Immature Granulocytes 0.1      Absolute NRBCs 0.0     ROUTINE UA WITH MICROSCOPIC REFLEX TO CULTURE - Abnormal    Color Urine Orange (*)     Appearance Urine Slightly Cloudy (*)      Glucose Urine 50 (*)     Bilirubin Urine Small (*)     Ketones Urine Trace (*)     Specific Gravity Urine 1.029      Blood Urine Moderate (*)     pH Urine 5.5      Protein Albumin Urine 200 (*)     Urobilinogen Urine 8.0 (*)     Nitrite Urine Negative      Leukocyte Esterase Urine Moderate (*)     Mucus Urine Present (*)     RBC Urine 35 (*)     WBC Urine 42 (*)     Squamous Epithelials Urine 5 (*)     Hyaline Casts Urine 67 (*)    LACTIC ACID WHOLE BLOOD - Normal    Lactic Acid 1.2     URINE CULTURE   BLOOD CULTURE   BLOOD CULTURE        Procedures   None    Emergency Department Course & Assessments:    Interventions:  Medications   sodium chloride 0.9% BOLUS 1,000 mL (1,000 mLs Intravenous $New Bag 2/18/24 1618)   18:56:  Zosyn 4.5 grams IV  17:30:  NS 1 LIter IVF     Assessments:  1700 I obtained history and examined the patient as noted above.     Independent Interpretation (X-rays, CTs, rhythm strip):  None    Consultations/Discussion of Management or Tests:  1800 I spoke with [place] radiology Dr. Zimmerman who reports that the patient has perforated sigmoid diverticulitis with abscess.  I ordered blood cultures x 2, lactic acid and Zosyn IV.  I spoke with Dr. Galarza the hospitalist who accepts the patient for admission  1815: I spoke with Dr. Ken Bhakta the colorectal surgeon states he will see the patient in the emergency department this evening.  18:25:  I spoke with IR (Dr. Etienne) regarding percutaneous drainage of the diverticular abscess.  He states that he will drain the abscess early tomorrow morning.  He recommended to keep the patient n.p.o. after midnight.    Social Determinants of Health affecting care:  Healthcare Access/Compliance     Disposition:  The patient was admitted to the hospital under the care of Dr. Galarza.     Impression & Plan        Medical Decision Making:  I found this patient to have perforated sigmoid diverticulitis with a large abscess formation with acute sepsis  syndrome.  Therefore he was given Zosyn IV after blood cultures x 2 were drawn.  He was given IV fluids and pain control.  I consulted colorectal surgery and interventional radiology.  IR states he will drain the abscess percutaneously in the morning and to keep him n.p.o. after midnight.  I initially considered the possibly of C. difficile colitis in the differential diagnosis since he has a history of this so stool testing was ordered, however the patient had a negative C. difficile test 2 weeks ago so this is very unlikely especially considering the CT findings consistent with perforated diverticulitis.  He was admitted to the care of the hospitalist service.    Diagnosis:    ICD-10-CM    1. Abscess of sigmoid colon due to diverticulitis  K57.20       2. Acute sepsis (H)  A41.9       3. Diverticulitis of colon with perforation  K57.20            Discharge Medications:  New Prescriptions    No medications on file          Scribe Disclosure:  Karishma CONNER, am serving as a scribe at 5:10 PM on 2/18/2024 to document services personally performed by Vivian Montes MD based on my observations and the provider's statements to me.  2/18/2024   Vivian Montes MD [place], Cheri Lee, MD  02/18/24 4744

## 2024-02-19 ENCOUNTER — APPOINTMENT (OUTPATIENT)
Dept: CT IMAGING | Facility: CLINIC | Age: 69
DRG: 391 | End: 2024-02-19
Attending: RADIOLOGY
Payer: COMMERCIAL

## 2024-02-19 DIAGNOSIS — K57.20 DIVERTICULITIS OF COLON WITH PERFORATION: ICD-10-CM

## 2024-02-19 DIAGNOSIS — A41.9 ACUTE SEPSIS (H): ICD-10-CM

## 2024-02-19 DIAGNOSIS — K57.20 ABSCESS OF SIGMOID COLON DUE TO DIVERTICULITIS: ICD-10-CM

## 2024-02-19 LAB
ANION GAP SERPL CALCULATED.3IONS-SCNC: 12 MMOL/L (ref 7–15)
BACTERIA UR CULT: NORMAL
BUN SERPL-MCNC: 10 MG/DL (ref 8–23)
CALCIUM SERPL-MCNC: 8.2 MG/DL (ref 8.8–10.2)
CHLORIDE SERPL-SCNC: 101 MMOL/L (ref 98–107)
CREAT SERPL-MCNC: 0.78 MG/DL (ref 0.67–1.17)
DEPRECATED HCO3 PLAS-SCNC: 24 MMOL/L (ref 22–29)
EGFRCR SERPLBLD CKD-EPI 2021: >90 ML/MIN/1.73M2
ERYTHROCYTE [DISTWIDTH] IN BLOOD BY AUTOMATED COUNT: 13 % (ref 10–15)
GLUCOSE SERPL-MCNC: 140 MG/DL (ref 70–99)
GRAM STAIN RESULT: ABNORMAL
HCT VFR BLD AUTO: 35.1 % (ref 40–53)
HGB BLD-MCNC: 12 G/DL (ref 13.3–17.7)
MCH RBC QN AUTO: 29.8 PG (ref 26.5–33)
MCHC RBC AUTO-ENTMCNC: 34.2 G/DL (ref 31.5–36.5)
MCV RBC AUTO: 87 FL (ref 78–100)
PLATELET # BLD AUTO: 475 10E3/UL (ref 150–450)
POTASSIUM SERPL-SCNC: 3.3 MMOL/L (ref 3.4–5.3)
POTASSIUM SERPL-SCNC: 3.8 MMOL/L (ref 3.4–5.3)
RBC # BLD AUTO: 4.03 10E6/UL (ref 4.4–5.9)
SODIUM SERPL-SCNC: 137 MMOL/L (ref 135–145)
WBC # BLD AUTO: 13.1 10E3/UL (ref 4–11)

## 2024-02-19 PROCEDURE — 999N000154 HC STATISTIC RADIOLOGY XRAY, US, CT, MAR, NM

## 2024-02-19 PROCEDURE — 0W9G30Z DRAINAGE OF PERITONEAL CAVITY WITH DRAINAGE DEVICE, PERCUTANEOUS APPROACH: ICD-10-PCS | Performed by: RADIOLOGY

## 2024-02-19 PROCEDURE — 84132 ASSAY OF SERUM POTASSIUM: CPT | Performed by: INTERNAL MEDICINE

## 2024-02-19 PROCEDURE — 36415 COLL VENOUS BLD VENIPUNCTURE: CPT | Performed by: INTERNAL MEDICINE

## 2024-02-19 PROCEDURE — 80048 BASIC METABOLIC PNL TOTAL CA: CPT | Performed by: INTERNAL MEDICINE

## 2024-02-19 PROCEDURE — 120N000001 HC R&B MED SURG/OB

## 2024-02-19 PROCEDURE — 272N000431 CT ABDOMEN PERITONEUM ABSCESS DRAIN W CATH PLACE

## 2024-02-19 PROCEDURE — 250N000013 HC RX MED GY IP 250 OP 250 PS 637: Performed by: INTERNAL MEDICINE

## 2024-02-19 PROCEDURE — 85014 HEMATOCRIT: CPT | Performed by: INTERNAL MEDICINE

## 2024-02-19 PROCEDURE — 99152 MOD SED SAME PHYS/QHP 5/>YRS: CPT

## 2024-02-19 PROCEDURE — 87070 CULTURE OTHR SPECIMN AEROBIC: CPT | Performed by: RADIOLOGY

## 2024-02-19 PROCEDURE — 250N000009 HC RX 250: Performed by: INTERNAL MEDICINE

## 2024-02-19 PROCEDURE — 250N000011 HC RX IP 250 OP 636: Performed by: RADIOLOGY

## 2024-02-19 PROCEDURE — 250N000011 HC RX IP 250 OP 636: Performed by: INTERNAL MEDICINE

## 2024-02-19 PROCEDURE — 99232 SBSQ HOSP IP/OBS MODERATE 35: CPT | Performed by: INTERNAL MEDICINE

## 2024-02-19 PROCEDURE — 87075 CULTR BACTERIA EXCEPT BLOOD: CPT | Performed by: RADIOLOGY

## 2024-02-19 PROCEDURE — 87205 SMEAR GRAM STAIN: CPT | Performed by: RADIOLOGY

## 2024-02-19 PROCEDURE — 258N000003 HC RX IP 258 OP 636: Performed by: INTERNAL MEDICINE

## 2024-02-19 RX ORDER — FLUMAZENIL 0.1 MG/ML
0.2 INJECTION, SOLUTION INTRAVENOUS
Status: DISCONTINUED | OUTPATIENT
Start: 2024-02-19 | End: 2024-02-19

## 2024-02-19 RX ORDER — LACTOBACILLUS RHAMNOSUS GG 10B CELL
1 CAPSULE ORAL 2 TIMES DAILY
Status: DISCONTINUED | OUTPATIENT
Start: 2024-02-19 | End: 2024-02-25 | Stop reason: HOSPADM

## 2024-02-19 RX ORDER — NALOXONE HYDROCHLORIDE 0.4 MG/ML
0.2 INJECTION, SOLUTION INTRAMUSCULAR; INTRAVENOUS; SUBCUTANEOUS
Status: DISCONTINUED | OUTPATIENT
Start: 2024-02-19 | End: 2024-02-19

## 2024-02-19 RX ORDER — FENTANYL CITRATE 50 UG/ML
25-50 INJECTION, SOLUTION INTRAMUSCULAR; INTRAVENOUS EVERY 5 MIN PRN
Status: DISCONTINUED | OUTPATIENT
Start: 2024-02-19 | End: 2024-02-19

## 2024-02-19 RX ORDER — NALOXONE HYDROCHLORIDE 0.4 MG/ML
0.4 INJECTION, SOLUTION INTRAMUSCULAR; INTRAVENOUS; SUBCUTANEOUS
Status: DISCONTINUED | OUTPATIENT
Start: 2024-02-19 | End: 2024-02-19

## 2024-02-19 RX ORDER — POTASSIUM CHLORIDE 1500 MG/1
40 TABLET, EXTENDED RELEASE ORAL ONCE
Status: COMPLETED | OUTPATIENT
Start: 2024-02-19 | End: 2024-02-19

## 2024-02-19 RX ORDER — LIDOCAINE 40 MG/G
CREAM TOPICAL
Status: DISCONTINUED | OUTPATIENT
Start: 2024-02-19 | End: 2024-02-19

## 2024-02-19 RX ADMIN — SODIUM CHLORIDE: 9 INJECTION, SOLUTION INTRAVENOUS at 19:54

## 2024-02-19 RX ADMIN — PIPERACILLIN AND TAZOBACTAM 4.5 G: 4; .5 INJECTION, POWDER, FOR SOLUTION INTRAVENOUS at 11:02

## 2024-02-19 RX ADMIN — Medication 1 CAPSULE: at 12:25

## 2024-02-19 RX ADMIN — ACETAMINOPHEN 650 MG: 325 TABLET, FILM COATED ORAL at 20:00

## 2024-02-19 RX ADMIN — FENTANYL CITRATE 50 MCG: 50 INJECTION, SOLUTION INTRAMUSCULAR; INTRAVENOUS at 10:06

## 2024-02-19 RX ADMIN — VANCOMYCIN HYDROCHLORIDE 125 MG: 125 CAPSULE ORAL at 08:06

## 2024-02-19 RX ADMIN — VANCOMYCIN HYDROCHLORIDE 125 MG: 125 CAPSULE ORAL at 19:49

## 2024-02-19 RX ADMIN — HYDROMORPHONE HYDROCHLORIDE 0.2 MG: 0.2 INJECTION, SOLUTION INTRAMUSCULAR; INTRAVENOUS; SUBCUTANEOUS at 14:35

## 2024-02-19 RX ADMIN — VANCOMYCIN HYDROCHLORIDE 125 MG: 125 CAPSULE ORAL at 00:12

## 2024-02-19 RX ADMIN — POTASSIUM CHLORIDE 40 MEQ: 1500 TABLET, EXTENDED RELEASE ORAL at 11:01

## 2024-02-19 RX ADMIN — SODIUM CHLORIDE: 9 INJECTION, SOLUTION INTRAVENOUS at 06:58

## 2024-02-19 RX ADMIN — OXYCODONE HYDROCHLORIDE 5 MG: 5 TABLET ORAL at 20:00

## 2024-02-19 RX ADMIN — LIDOCAINE HYDROCHLORIDE 20 ML: 10 INJECTION, SOLUTION EPIDURAL; INFILTRATION; INTRACAUDAL; PERINEURAL at 10:58

## 2024-02-19 RX ADMIN — HYDROMORPHONE HYDROCHLORIDE 0.2 MG: 0.2 INJECTION, SOLUTION INTRAMUSCULAR; INTRAVENOUS; SUBCUTANEOUS at 16:50

## 2024-02-19 RX ADMIN — MIDAZOLAM 1 MG: 1 INJECTION INTRAMUSCULAR; INTRAVENOUS at 10:05

## 2024-02-19 RX ADMIN — PIPERACILLIN AND TAZOBACTAM 4.5 G: 4; .5 INJECTION, POWDER, FOR SOLUTION INTRAVENOUS at 06:10

## 2024-02-19 RX ADMIN — Medication 1 CAPSULE: at 21:50

## 2024-02-19 RX ADMIN — PIPERACILLIN AND TAZOBACTAM 4.5 G: 4; .5 INJECTION, POWDER, FOR SOLUTION INTRAVENOUS at 18:35

## 2024-02-19 ASSESSMENT — ACTIVITIES OF DAILY LIVING (ADL)
ADLS_ACUITY_SCORE: 23
ADLS_ACUITY_SCORE: 25
ADLS_ACUITY_SCORE: 23
ADLS_ACUITY_SCORE: 27
ADLS_ACUITY_SCORE: 23
ADLS_ACUITY_SCORE: 27
ADLS_ACUITY_SCORE: 23
ADLS_ACUITY_SCORE: 27
ADLS_ACUITY_SCORE: 27
ADLS_ACUITY_SCORE: 23

## 2024-02-19 NOTE — PROGRESS NOTES
[facility]    Medicine Progress Note - Hospitalist Service    Date of Admission:  2/18/2024    Assessment & Plan     Assessment & Plan  Castillo Alba is a 68 year old male with history of C. difficile infection, prior diverticulitis presented to emergency department complaining of abdominal pain and diarrhea.  CT scan of the abdomen pelvis shows acute sigmoid diverticulitis complicated by perforation and likely abscess.     Principal Problem:    Diverticulitis of large intestine with perforation and abscess S/p IR guided drain placement on 2/19 per Colorectal surgery     History of C. difficile colitis consistent with colonization   Admit as inpatient  Zosyn 4.5 g IV every 8  Per Dr. Drain, CT images show that the fluid collection is amenable to percutaneous abscess.  She has a call out to IR.  IR consult requested  Colorectal surgery consult requested  N.p.o. pending intervention (if any)  Patient has a history of antibiotic associated diarrhea; C. difficile toxin was positive on 12/18/2023, he was treated with metronidazole.  Discussed with Pharm.D.  Begin oral vancomycin for prophylaxis while on broad-spectrum antibiotics   C. difficile PCR is positive but GDH antigen and C. difficile toxin tests are negative. This should mean that patient does not have active C. difficile infection. Continue vancomycin 125 mg p.o. twice daily for C. difficile prophylaxis while on broad-spectrum antibiotics for diverticulitis with abscess.     S/p subcutaneous drain placement by IR     Active Problems:    Hyponatremia resolved with IVF   Likely related to volume depletion from diarrhea  Treat with normal saline  Recheck BMP       Leukocytosis    Thrombocythemia  Follow-up blood counts  Wbc count improving         Pyuria  Follow-up urine culture  As above, continue Zosyn        Tobacco use disorder  Nicotine replacement as needed           Diet: NPO per Anesthesia Guidelines for Procedure/Surgery  Except for: No Exceptions    DVT Prophylaxis: Pneumatic Compression Devices  [place]ora Catheter: Not present  Lines: None     Cardiac Monitoring: None  Code Status:  Full        Clinically Significant Risk Factors Present on Admission               # Hypoalbuminemia: Lowest albumin = 3.1 g/dL at 2/18/2024  2:11 PM, will monitor as appropriate          # Obesity: Estimated body mass index is 35.26 kg/m  as calculated from the following:    Height as of this encounter: 1.829 m (6').    Weight as of this encounter: 117.9 kg (260 lb).                                 Clinically Significant Risk Factors Present on Admission        # Hypokalemia: Lowest K = 3.3 mmol/L in last 2 days, will replace as needed       # Hypoalbuminemia: Lowest albumin = 3.1 g/dL at 2/18/2024  2:11 PM, will monitor as appropriate          # Obesity: Estimated body mass index is 35.26 kg/m  as calculated from the following:    Height as of this encounter: 1.829 m (6').    Weight as of this encounter: 117.9 kg (260 lb).              Disposition Plan      Expected Discharge Date: 02/20/2024             In the 4-5days when OK with Colorectal and IR          Janis Anderson MD  Hospitalist Service  [facility]  Securely message with [employer] (more info)  Text page via [employer] Paging/Directory   ______________________________________________________________________    Interval History   Pt resting in bed. IR subcutaneous drain placed and draining pussy looking fluid. Denies any pain or N/V. Had 3 Bms last night. Discussed with bedside RN     Physical Exam   Vital Signs: Temp: 98.4  F (36.9  C) Temp src: Oral BP: 122/68 Pulse: 80   Resp: 16 SpO2: 95 % O2 Device: None (Room air) Oxygen Delivery: 2 LPM  Weight: 260 lbs 0 oz    General Appearance: Alert, awake, NAD   Respiratory: CTA b/l   Cardiovascular: RRR  GI: soft, tender in lower abdomen, BS+   Skin: warm and dry   Other:      Medical Decision Making       49 MINUTES SPENT BY ME on the  date of service doing chart review, history, exam, documentation & further activities per the note.      Data     I have personally reviewed the following data over the past 24 hrs:    13.1 (H)  \   12.0 (L)   / 475 (H)     137 101 10.0 /  140 (H)   3.3 (L) 24 0.78 \     ALT: 30 AST: 29 AP: 146 TBILI: 0.6   ALB: 3.1 (L) TOT PROTEIN: 7.2 LIPASE: N/A     Procal: N/A CRP: N/A Lactic Acid: 1.2         Imaging results reviewed over the past 24 hrs:   Recent Results (from the past 24 hour(s))   CT Abdomen Pelvis w Contrast   Result Value    Radiologist flags Perforated viscus (AA)    Narrative    EXAM: CT ABDOMEN PELVIS W CONTRAST  LOCATION: [facility]  DATE: 2/18/2024    INDICATION: check for Diverticulitis, abscess, c diff colitis  LLQ pain and diarrhea  COMPARISON: None.  TECHNIQUE: CT scan of the abdomen and pelvis was performed following injection of IV contrast. Multiplanar reformats were obtained. Dose reduction techniques were used.  CONTRAST: 131mL Isovue 370    FINDINGS:   LOWER CHEST: Partially imaged coronary artery calcifications.    HEPATOBILIARY: Normal.    PANCREAS: Normal.    SPLEEN: Normal.    ADRENAL GLANDS: Normal.    KIDNEYS/BLADDER: No significant mass, obstructing stone, or hydronephrosis. Focal wall thickening of the anterolateral superior bladder wall adjacent to the pelvic fluid collection. No intraluminal gas.    BOWEL: Left-sided diverticulosis with extensive wall thickening and pericolonic fat stranding in the proximal and mid sigmoid colon. There is extensive surrounding extraluminal gas, as well as a multiloculated gas-containing fluid collection anterior and   inferior to the affected segment of sigmoid colon, which measures 8.8 x 5.6 x 4.7 cm (3/181, 6/68). A few loops of small bowel are in close proximity with mild reactive wall thickening. No small bowel dilation.    LYMPH NODES: Normal.    VASCULATURE: Unremarkable.    PELVIC ORGANS: Normal.    MUSCULOSKELETAL:  Degenerative changes in the spine.      Impression    IMPRESSION:   1.  Acute sigmoid diverticulitis complicated by perforation with an adjacent gas containing fluid collection, most likely abscess.    2.  Localized wall thickening of the urinary bladder, likely secondary to the adjacent abscess. No intraluminal gas to suggest colovesical fistula.    [Critical Result: Perforated viscus]    Finding was identified on 2/18/2024 5:57 PM CST.     Dr. Vivian Montes was contacted by me on 2/18/2024 6:02 PM CST and verbalized understanding of the critical result.

## 2024-02-19 NOTE — PROVIDER NOTIFICATION
MD Notification    Notified Person: MD    Notified Person Name:  Michell    Notification Date/Time: 2/18/24 6850    Notification Interaction: [employer] Messaging    Purpose of Notification: C. Diff Toxin B by PCR positive, GDH antigen and Toxin both negative.  JEFF stool negative.  With recent C. Diff infection in December, should we continue Enteric isolation?    Orders Received: Maintain Enteric Isolation

## 2024-02-19 NOTE — PHARMACY-ADMISSION MEDICATION HISTORY
Pharmacist Admission Medication History    Admission medication history is complete. The information provided in this note is only as accurate as the sources available at the time of the update.    Information Source(s): Patient via in-person    Pertinent Information: Patient took an Advil cold and sinus on Friday but otherwise does not take regular Rx or OTC medications.       Allergies reviewed with patient and updates made in EHR: yes    Medication History Completed By: Maite Harry PharmD 2/18/2024 7:35 PM    No outpatient medications have been marked as taking for the 2/18/24 encounter (Hospital Encounter).

## 2024-02-19 NOTE — PROGRESS NOTES
RECEIVING UNIT ED HANDOFF REVIEW    ED Nurse Handoff Report was reviewed by: Robert Manuel RN on February 18, 2024 at 7:28 PM

## 2024-02-19 NOTE — ED NOTES
[facility]  ED Nurse Handoff Report    ED Chief complaint: Abdominal Pain      ED Diagnosis:   Final diagnoses:   Abscess of sigmoid colon due to diverticulitis   Acute sepsis (H)   Diverticulitis of colon with perforation       Code Status: not addressed at this time    Allergies: No Known Allergies    Patient Story: Pt ambulated into ED complaining of abdominal pain in LLQ. States he doesn't have an appetite for past two weeks. Pt denies N/V & blood in stool. Pt denies fever. States he's been having explosive diarrhea for past few weeks. He feels he is not eating enough and gets light headed. Pt has hx of diverticulitis.      Focused Assessment:  Pt denies abdominal pain at this time, no nausea at this time, but recent hx see above, A&Ox4    CT Abdomen Pelvis w Contrast   Final Result   Abnormal   IMPRESSION:    1.  Acute sigmoid diverticulitis complicated by perforation with an adjacent gas containing fluid collection, most likely abscess.      2.  Localized wall thickening of the urinary bladder, likely secondary to the adjacent abscess. No intraluminal gas to suggest colovesical fistula.      [Critical Result: Perforated viscus]      Finding was identified on 2/18/2024 5:57 PM CST.       Dr. Vivian Montes was contacted by me on 2/18/2024 6:02 PM CST and verbalized understanding of the critical result.        Labs Ordered and Resulted from Time of ED Arrival to Time of ED Departure   COMPREHENSIVE METABOLIC PANEL - Abnormal       Result Value    Sodium 131 (*)     Potassium 3.5      Carbon Dioxide (CO2) 27      Anion Gap 12      Urea Nitrogen 10.2      Creatinine 0.89      GFR Estimate >90      Calcium 8.8      Chloride 92 (*)     Glucose 175 (*)     Alkaline Phosphatase 146      AST 29      ALT 30      Protein Total 7.2      Albumin 3.1 (*)     Bilirubin Total 0.6     CBC WITH PLATELETS AND DIFFERENTIAL - Abnormal    WBC Count 16.0 (*)     RBC Count 4.76      Hemoglobin 14.0      Hematocrit  41.4      MCV 87      MCH 29.4      MCHC 33.8      RDW 12.7      Platelet Count 581 (*)     % Neutrophils 78      % Lymphocytes 13      % Monocytes 8      % Eosinophils 0      % Basophils 0      % Immature Granulocytes 1      NRBCs per 100 WBC 0      Absolute Neutrophils 12.4 (*)     Absolute Lymphocytes 2.1      Absolute Monocytes 1.3      Absolute Eosinophils 0.0      Absolute Basophils 0.0      Absolute Immature Granulocytes 0.1      Absolute NRBCs 0.0     ROUTINE UA WITH MICROSCOPIC REFLEX TO CULTURE - Abnormal    Color Urine Orange (*)     Appearance Urine Slightly Cloudy (*)     Glucose Urine 50 (*)     Bilirubin Urine Small (*)     Ketones Urine Trace (*)     Specific Gravity Urine 1.029      Blood Urine Moderate (*)     pH Urine 5.5      Protein Albumin Urine 200 (*)     Urobilinogen Urine 8.0 (*)     Nitrite Urine Negative      Leukocyte Esterase Urine Moderate (*)     Mucus Urine Present (*)     RBC Urine 35 (*)     WBC Urine 42 (*)     Squamous Epithelials Urine 5 (*)     Hyaline Casts Urine 67 (*)    LACTIC ACID WHOLE BLOOD - Normal    Lactic Acid 1.2     ENTERIC BACTERIA AND VIRUS PANEL BY PCR   C. DIFFICILE TOXIN B PCR WITH REFLEX TO C. DIFFICILE ANTIGEN AND TOXINS A/B EIA   URINE CULTURE   BLOOD CULTURE   BLOOD CULTURE       Treatments and/or interventions provided: fluids, antibiotics    Patient's response to treatments and/or interventions: unchanged    To be done/followed up on inpatient unit:  follow admitting MD orders    Does this patient have any cognitive concerns?:  none    Activity level - Baseline/Home:  Independent  Activity Level - Current:   Independent    Patient's Preferred language: English   Needed?: No    Isolation: enteric (r/o cdif)  Infection: C-Diff Pending  Patient tested for COVID 19 prior to admission: NO  Bariatric?: Yes    Vital Signs:   Vitals:    02/18/24 1359 02/18/24 1616 02/18/24 1617   BP: 122/68 135/59    Pulse: 79 70    Resp: 16     Temp: 98.5  F (36.9   C)     TempSrc: Temporal     SpO2: 99%  98%   Weight: 117.9 kg (260 lb)     Height: 1.829 m (6')         Cardiac Rhythm:     Was the PSS-3 completed:   Yes  What interventions are required if any?        Pt is NPO, no exceptions       Family Comments: family supportive at bedside    For the majority of the shift this patient's behavior was Green.     ED NURSE PHONE NUMBER: [phone]

## 2024-02-19 NOTE — CONSULTS
IR consult received. CT reviewed. Will plan on CT guided drain placement into diverticular abscess today. Please keep NPO.    Javier Mejia MD

## 2024-02-19 NOTE — PROGRESS NOTES
Care Suites Procedure Nursing Note    Patient Information  Name: Castillo Alba  Age: 68 year old    Procedure  Procedure: CT guided abscess drain placement  Procedure start time: 1004  Procedure complete time: 1015  Concerns/abnormal assessment: No INR needed per Dr. Mejia  If abnormal assessment, provider notified: N/A  Plan/Other: Proceed as planned    Jama London RN     [id number]  MD Sedation Exam completed at this time. Consent signed.    1004 Time Out done.    CT guided Abscess Drain: Pt tolerated well. VSS.    Total sedation given - 50 mcg Fentanyl and 1  mg Versed.     Drain placed w/o difficulty.  50 cc brownish fluid removed & specimen sent to lab. Drain irrigated with NS. Connected to MARIA ISABEL bulb - full suction. Stay Fix drsg applied & tube secured.    1045 Pt back to rm 2216-1 per cart & transport. Detailed report called to tunde Hoskins RN.

## 2024-02-19 NOTE — PROGRESS NOTES
COLON & RECTAL SURGERY  PROGRESS NOTE    02/19/2024    SUBJECTIVE:  Feels well this morning, lower abdominal pain is stable from yesterday. Many episodes of diarrhea overnight.    OBJECTIVE:  Temp:  [98.1  F (36.7  C)-98.6  F (37  C)] 98.1  F (36.7  C)  Pulse:  [61-79] 62  Resp:  [16-18] 18  BP: (101-149)/(53-74) 130/54  SpO2:  [92 %-99 %] 94 %    Intake/Output Summary (Last 24 hours) at 2/19/2024 0912  Last data filed at 2/18/2024 2026  Gross per 24 hour   Intake 2320 ml   Output --   Net 2320 ml       GENERAL:  Awake, alert, no acute distress, mildly uncomfortable appearing  HEAD: Normocephalic atraumatic  SCLERA: Anicteric  EXTREMITIES: Warm and well perfused  ABDOMEN:  Soft, obese, nondistended, mildly tender across lower abdomen especially in LLQ and suprapubic    LABS:  Lab Results   Component Value Date    WBC 13.1 02/19/2024    WBC 6.6 10/17/2019     Lab Results   Component Value Date    HGB 12.0 02/19/2024    HGB 17.2 10/17/2019     Lab Results   Component Value Date    HCT 35.1 02/19/2024    HCT 49.0 10/17/2019     Lab Results   Component Value Date     02/19/2024     10/17/2019     Last Basic Metabolic Panel:  Lab Results   Component Value Date     02/19/2024     10/17/2019      Lab Results   Component Value Date    POTASSIUM 3.3 02/19/2024    POTASSIUM 3.8 10/17/2019     Lab Results   Component Value Date    CHLORIDE 101 02/19/2024    CHLORIDE 106 10/17/2019     Lab Results   Component Value Date    LENORE 8.2 02/19/2024    LENORE 9.3 10/17/2019     Lab Results   Component Value Date    CO2 24 02/19/2024    CO2 26 10/17/2019     Lab Results   Component Value Date    BUN 10.0 02/19/2024    BUN 17 10/17/2019     Lab Results   Component Value Date    CR 0.78 02/19/2024    CR 0.84 10/17/2019     Lab Results   Component Value Date     02/19/2024     10/17/2019       ASSESSMENT/PLAN: 68yoM with h/o C. difficile infection treated in December 2023 and 5-6 lifetime episodes of  uncomplicated diverticulitis over the last 15 to 20 years who presents with acute sigmoid diverticulitis with an adjacent large 8.8 cm pericolonic abscess. Labs are consistent with C diff colonization without active toxin production.    - no indication for urgent surgical intervention  - agree w IR drain placement today into his large pericolonic abscess  - continue IV zosyn  - NPO  - OOB, ambulation as able  - we will continue to follow    Pt d/w Dr. Cruz.    Ken Bhakta MD, MS  Fellow, Colon & Rectal Surgery  [facility]  02/19/2024  9:15 AM    Colorectal Surgery can be reached at [phone] at all times. Between 5pm and 7am you will be connected to the on-call physician

## 2024-02-19 NOTE — PLAN OF CARE
Goal Outcome Evaluation:      DATE & TIME: 2/18/24 [id number]    COGNITION/BEHAVIOR: A&Ox4  VSS on RA  MOBILITY: SBA, up to bathroom often for voiding  PAIN: Some LLQ discomfort  DIET: NPO  RESP: Lung sounds clear.  No reports of shortness of breath.  GI/: LLQ tender.  Weeks of diarrhea,  2 loose Bms this shift  PV/NV: Lower extremity edema.  PCD's off  SKIN: Sacrum redness blanchable  Heels dry and flaky.   LINES: PIV infusing IVF NS @100   LAB/BG: C. Diff +, antigen and toxin negative.  JEFF stool negative.  Continue Enteric Isolation with recent C. Diff infection in December   TESTS/PROCEDURES: CRS Consult in ED, no surgery.  IR Consult pending for drainage of abscess.  OTHER: Enteric Isolation

## 2024-02-19 NOTE — PLAN OF CARE
Goal Outcome Evaluation:      Plan of Care Reviewed With: patient, spouse, child    Overall Patient Progress: no change    DATE & TIME: 2/18/24 ED Admit - 2300    COGNITION/BEHAVIOR: A&Ox4  Vital signs:  Temp: 98.5  F (36.9  C) Temp src: Oral BP: 134/67 Pulse: 61   Resp: 18 SpO2: 95 % via RA  MOBILITY: SBA, up to bathroom often for voiding  PAIN: Some LLQ discomfort  DIET: NPO  RESP: Lung sounds clear.  No reports of shortness of breath.  CV: HRRR.  No reports of chest pain.  GI/: LLQ tender.  Weeks of diarrhea, none since arrival.  PV/NV: Lower extremity edema.  PCD's tolerated briefly until bed time.  SKIN: Sacrum red.  Heels dry and flaky.  Dual nurse skin check completed on admission.  LINES: PIV infusing IVF  LAB/BG: C. Diff +, antigen and toxin negative.  JEFF stool negative.  Continue Enteric Isolation with recent C. Diff infection in December per discussion with MD.  TESTS/PROCEDURES: CRS Consult in ED, no surgery.  IR Consult pending for drainage of abscess.  OTHER: Enteric Isolation

## 2024-02-19 NOTE — CONSULTS
[facility]    Colon and Rectal Surgery Consultation    Date of Admission:  2/18/2024    Assessment & Plan   Castillo Alba is a 68 year old male with a history of C. difficile infection treated in December 2023 and 5-6 lifetime episodes of uncomplicated diverticulitis over the last 15 to 20 years who presents with acute sigmoid diverticulitis with an adjacent large 8.8 cm pericolonic abscess.  He has never had a colonoscopy.  We discussed the natural history and treatment of complicated diverticulitis.  We discussed that in this case, he has no indications for urgent surgery, but will need treatment with IV antibiotics and IR drainage of his abscess.  We also discussed that after he recovers from this acute episode, he will need to have a colonoscopy in 6 to 8 weeks and meet with colorectal surgery in clinic to discuss possible elective sigmoid colectomy.  He expressed understanding.    -No indication for acute surgical intervention  -Agree with admission to hospital medicine, IV Zosyn  -N.p.o.  -Agree with interventional radiology consult for drainage of his large pericolonic abscess  -Outpatient colonoscopy in 6 to 8 weeks  -We will continue to follow    Patient discussed with Dr. Cruz.    Ken Bhakta MD, MS  Fellow, Colon & Rectal Surgery  [facility]  02/18/2024  7:51 PM    Colorectal Surgery can be reached at [phone] at all times. Between 5pm and 7am you will be connected to the on-call physician          Code Status    Full Code    Reason for Consult   Reason for consult: diverticulitis with abscess    Primary Care Physician   Matrín Morley    Chief Complaint   Lower abdominal pain, fever    History is obtained from the patient    History of Present Illness   Castillo Alba is a 68 year old man who presents with 2 weeks of lower abdominal pain, malaise, fevers, and chills in the setting of 2 to 3 months of abnormal bowel habits in the setting of a C.  difficile infection in December 2023.  His woes started in October 2023 when he had a dental abscess treated with antibiotics, after which he started having irregular bowel movements, eventually worsening to diarrhea nearly hourly.  He was eventually diagnosed with C. difficile colitis and underwent treatment for this in December 2023.  By January 2024, he was feeling much better.  However, over the last 2 weeks, he has again started feeling worse, and has developed lower abdominal pain, fatigue, decreased appetite, and daily subjective fevers and chills.  These symptoms did not worsen over the last few days but also did not improve, so he presented to the [facility] ED today for evaluation.    Of note, Mr. Alba states that he has had 5-6 episodes of diverticulitis over the past 15 to 20 years.  However, all of these have been managed outpatient with oral antibiotics and a liquid diet.  He has never had to have hospital stay, procedure, or surgery for his diverticulitis.  He has never had a colonoscopy.    Here in the ED, he was afebrile with normal vitals.  Labs were notable for WBC of 16 and a positive urinalysis.  CT abdomen pelvis was done which showed acute sigmoid diverticulitis with an adjacent large 8.8 cm abscess containing fluid and gas.  There was no intra-abdominal free air.  Given his complicated diverticulitis, colorectal surgery was consulted.    Past Medical History   I have reviewed this patient's medical history and updated it with pertinent information if needed.   History reviewed. No pertinent past medical history.    Past Surgical History   I have reviewed this patient's surgical history and updated it with pertinent information if needed.  No past surgical history on file.    Prior to Admission Medications   None     Allergies   No Known Allergies    Social History   Accompanied today by his son. Lives in [place].    Family History   I have reviewed this patient's family history and updated it  with pertinent information if needed.   No family history on file.    Review of Systems   The 10 point Review of Systems is negative other than noted in the HPI or here.    Physical Exam   Temp: 98.5  F (36.9  C) Temp src: Temporal BP: 101/53 Pulse: 71   Resp: 16 SpO2: 96 %      Vital Signs with Ranges  Temp:  [98.5  F (36.9  C)] 98.5  F (36.9  C)  Pulse:  [69-79] 71  Resp:  [16] 16  BP: (101-135)/(53-69) 101/53  SpO2:  [96 %-99 %] 96 %  260 lbs 0 oz    Gen: lying in bed on his side, mildly uncomfortable appearing  Heart: RRR  Lungs: normal WOB on RA  Abd: soft, nondistended, mildly tender to palpation in LLQ and suprapubic. No rebound or guarding.  Extr: WWP    Data   Results for orders placed or performed during the hospital encounter of 02/18/24 (from the past 24 hour(s))   CBC with platelets + differential    Narrative    The following orders were created for panel order CBC with platelets + differential.  Procedure                               Abnormality         Status                     ---------                               -----------         ------                     CBC with platelets and d...[[id number]]  Abnormal            Final result                 Please view results for these tests on the individual orders.   Comprehensive metabolic panel   Result Value Ref Range    Sodium 131 (L) 135 - 145 mmol/L    Potassium 3.5 3.4 - 5.3 mmol/L    Carbon Dioxide (CO2) 27 22 - 29 mmol/L    Anion Gap 12 7 - 15 mmol/L    Urea Nitrogen 10.2 8.0 - 23.0 mg/dL    Creatinine 0.89 0.67 - 1.17 mg/dL    GFR Estimate >90 >60 mL/min/1.73m2    Calcium 8.8 8.8 - 10.2 mg/dL    Chloride 92 (L) 98 - 107 mmol/L    Glucose 175 (H) 70 - 99 mg/dL    Alkaline Phosphatase 146 40 - 150 U/L    AST 29 0 - 45 U/L    ALT 30 0 - 70 U/L    Protein Total 7.2 6.4 - 8.3 g/dL    Albumin 3.1 (L) 3.5 - 5.2 g/dL    Bilirubin Total 0.6 <=1.2 mg/dL   [place] Draw    Narrative    The following orders were created for panel order [place]  Draw.  Procedure                               Abnormality         Status                     ---------                               -----------         ------                     Extra Blue Top Tube[[id number]]                              Final result                 Please view results for these tests on the individual orders.   CBC with platelets and differential   Result Value Ref Range    WBC Count 16.0 (H) 4.0 - 11.0 10e3/uL    RBC Count 4.76 4.40 - 5.90 10e6/uL    Hemoglobin 14.0 13.3 - 17.7 g/dL    Hematocrit 41.4 40.0 - 53.0 %    MCV 87 78 - 100 fL    MCH 29.4 26.5 - 33.0 pg    MCHC 33.8 31.5 - 36.5 g/dL    RDW 12.7 10.0 - 15.0 %    Platelet Count 581 (H) 150 - 450 10e3/uL    % Neutrophils 78 %    % Lymphocytes 13 %    % Monocytes 8 %    % Eosinophils 0 %    % Basophils 0 %    % Immature Granulocytes 1 %    NRBCs per 100 WBC 0 <1 /100    Absolute Neutrophils 12.4 (H) 1.6 - 8.3 10e3/uL    Absolute Lymphocytes 2.1 0.8 - 5.3 10e3/uL    Absolute Monocytes 1.3 0.0 - 1.3 10e3/uL    Absolute Eosinophils 0.0 0.0 - 0.7 10e3/uL    Absolute Basophils 0.0 0.0 - 0.2 10e3/uL    Absolute Immature Granulocytes 0.1 <=0.4 10e3/uL    Absolute NRBCs 0.0 10e3/uL   Extra Blue Top Tube   Result Value Ref Range    Hold Specimen JI    UA with Microscopic reflex to Culture    Specimen: Urine, Midstream   Result Value Ref Range    Color Urine Orange (A) Colorless, Straw, Light Yellow, Yellow    Appearance Urine Slightly Cloudy (A) Clear    Glucose Urine 50 (A) Negative mg/dL    Bilirubin Urine Small (A) Negative    Ketones Urine Trace (A) Negative mg/dL    Specific Gravity Urine 1.029 1.003 - 1.035    Blood Urine Moderate (A) Negative    pH Urine 5.5 5.0 - 7.0    Protein Albumin Urine 200 (A) Negative mg/dL    Urobilinogen Urine 8.0 (A) Normal, 2.0 mg/dL    Nitrite Urine Negative Negative    Leukocyte Esterase Urine Moderate (A) Negative    Mucus Urine Present (A) None Seen /LPF    RBC Urine 35 (H) <=2 /HPF    WBC Urine 42 (H) <=5  /HPF    Squamous Epithelials Urine 5 (H) <=1 /HPF    Hyaline Casts Urine 67 (H) <=2 /LPF    Narrative    Urine Culture ordered based on laboratory criteria   CT Abdomen Pelvis w Contrast   Result Value Ref Range    Radiologist flags Perforated viscus (AA)     Narrative    EXAM: CT ABDOMEN PELVIS W CONTRAST  LOCATION: [facility]  DATE: 2/18/2024    INDICATION: check for Diverticulitis, abscess, c diff colitis  LLQ pain and diarrhea  COMPARISON: None.  TECHNIQUE: CT scan of the abdomen and pelvis was performed following injection of IV contrast. Multiplanar reformats were obtained. Dose reduction techniques were used.  CONTRAST: 131mL Isovue 370    FINDINGS:   LOWER CHEST: Partially imaged coronary artery calcifications.    HEPATOBILIARY: Normal.    PANCREAS: Normal.    SPLEEN: Normal.    ADRENAL GLANDS: Normal.    KIDNEYS/BLADDER: No significant mass, obstructing stone, or hydronephrosis. Focal wall thickening of the anterolateral superior bladder wall adjacent to the pelvic fluid collection. No intraluminal gas.    BOWEL: Left-sided diverticulosis with extensive wall thickening and pericolonic fat stranding in the proximal and mid sigmoid colon. There is extensive surrounding extraluminal gas, as well as a multiloculated gas-containing fluid collection anterior and   inferior to the affected segment of sigmoid colon, which measures 8.8 x 5.6 x 4.7 cm (3/181, 6/68). A few loops of small bowel are in close proximity with mild reactive wall thickening. No small bowel dilation.    LYMPH NODES: Normal.    VASCULATURE: Unremarkable.    PELVIC ORGANS: Normal.    MUSCULOSKELETAL: Degenerative changes in the spine.      Impression    IMPRESSION:   1.  Acute sigmoid diverticulitis complicated by perforation with an adjacent gas containing fluid collection, most likely abscess.    2.  Localized wall thickening of the urinary bladder, likely secondary to the adjacent abscess. No intraluminal gas to  suggest colovesical fistula.    [Critical Result: Perforated viscus]    Finding was identified on 2/18/2024 5:57 PM CST.     Dr. Vivian Montes was contacted by me on 2/18/2024 6:02 PM CST and verbalized understanding of the critical result.   Lactic acid whole blood   Result Value Ref Range    Lactic Acid 1.2 0.7 - 2.0 mmol/L

## 2024-02-19 NOTE — OP NOTE
S/p CT guided drain into diverticular abscess. Patient tolerated procedure well and left department in stable condition.  LINDA Mejia MD

## 2024-02-19 NOTE — H&P
[facility]    History and Physical - Hospitalist Service       Date of Admission:  2/18/2024    Addendum: RN paged reporting that C. difficile PCR is positive but GDH antigen and C. difficile toxin tests are negative.  This should mean that patient does not have active C. difficile infection.  Continue vancomycin 125 mg p.o. twice daily for C. difficile prophylaxis while on broad-spectrum antibiotics for diverticulitis with abscess.  Continue enteric precautions.  If concern for C. difficile infection rises again, consider repeat C. difficile PCR and ID consult.    Assessment & Plan      Castillo Alba is a 68 year old male with history of C. difficile infection, prior diverticulitis presented to emergency department complaining of abdominal pain and diarrhea.  CT scan of the abdomen pelvis shows acute sigmoid diverticulitis complicated by perforation and likely abscess.    Principal Problem:    Diverticulitis of large intestine with perforation and abscess    History of C. difficile colitis  Admit as inpatient  Zosyn 4.5 g IV every 8  Per Dr. Drain, CT images show that the fluid collection is amenable to percutaneous abscess.  She has a call out to IR.  IR consult requested  Colorectal surgery consult requested  N.p.o. pending intervention (if any)  Patient has a history of antibiotic associated diarrhea; C. difficile toxin was positive on 12/18/2023, he was treated with metronidazole.  Discussed with Pharm.D.  Begin oral vancomycin for prophylaxis while on broad-spectrum antibiotics    Active Problems:    Hyponatremia  Likely related to volume depletion from diarrhea  Treat with normal saline  Recheck BMP      Leukocytosis    Thrombocythemia  Follow-up blood counts        Pyuria  Follow-up urine culture  As above, continue Zosyn        Tobacco use disorder  Nicotine replacement as needed           Diet: NPO per Anesthesia Guidelines for Procedure/Surgery Except for: No Exceptions   "  DVT Prophylaxis: Pneumatic Compression Devices  Zamora Catheter: Not present  Lines: None     Cardiac Monitoring: None  Code Status:  Full    Clinically Significant Risk Factors Present on Admission              # Hypoalbuminemia: Lowest albumin = 3.1 g/dL at 2/18/2024  2:11 PM, will monitor as appropriate          # Obesity: Estimated body mass index is 35.26 kg/m  as calculated from the following:    Height as of this encounter: 1.829 m (6').    Weight as of this encounter: 117.9 kg (260 lb).              Disposition Plan           Susana Galarza MD  Hospitalist Service  [facility]  Securely message with [employer] (more info)  Text page via [facility] Paging/Directory     ______________________________________________________________________    Chief Complaint   \"I have just felt crappy.\"      History of Present Illness   Castillo Alba is a 68 year old male with history of C. difficile infection, prior diverticulitis presented to emergency department complaining of abdominal pain and diarrhea.      Patient says he has had little appetite for the past 2 weeks associated with \"explosive\" diarrhea.  He says that virtually every day, he will have an episode of chills in the afternoon and have to lay down and cover himself up with blankets.  He feels like he forces himself to eat, food has no appeal.  At times, he is having a stool almost every hour, has not noted any black or bloody stools.  He has some midline low abdominal discomfort.    He came to emergency department for evaluation where CT of the abdomen pelvis shows sigmoid diverticulitis with perforation and abscess.  He is admitted for further evaluation and treatment.      Past Medical History    History reviewed. No pertinent past medical history.    Past Surgical History   Prior nose surgery    Prior to Admission Medications   None        Social History   I have reviewed this patient's social history and updated it with pertinent " information if needed.  Smokes 3 to 4 cigarettes/day, has 3-4 drinks of alcohol on weekends.        Physical Exam   Vital Signs: Temp: 98.5  F (36.9  C) Temp src: Temporal BP: 135/59 Pulse: 70   Resp: 16 SpO2: 98 %      Weight: 260 lbs 0 oz    Constitutional: Awake, but looks exhausted.  Respiratory: Clear to auscultation bilaterally, no crackles or wheezing  Cardiovascular: Regular rate and rhythm  GI: Obese, soft, bowel sounds are present.  There is perhaps slight tenderness in the left lower quadrant without rebound or guarding  Skin/Integumen: No rash on exposed skin.  No lower extremity edema  Other: Mood is discouraged      Medical Decision Making       81 MINUTES SPENT BY ME on the date of service doing chart review, history, exam, documentation & further activities per the note.      Data     I have personally reviewed the following data over the past 24 hrs:    16.0 (H)  \   14.0   / 581 (H)     131 (L) 92 (L) 10.2 /  175 (H)   3.5 27 0.89 \     ALT: 30 AST: 29 AP: 146 TBILI: 0.6   ALB: 3.1 (L) TOT PROTEIN: 7.2 LIPASE: N/A       Imaging results reviewed over the past 24 hrs:   Recent Results (from the past 24 hour(s))   CT Abdomen Pelvis w Contrast   Result Value    Radiologist flags Perforated viscus (AA)    Narrative    EXAM: CT ABDOMEN PELVIS W CONTRAST  LOCATION: [facility]  DATE: 2/18/2024    INDICATION: check for Diverticulitis, abscess, c diff colitis  LLQ pain and diarrhea  COMPARISON: None.  TECHNIQUE: CT scan of the abdomen and pelvis was performed following injection of IV contrast. Multiplanar reformats were obtained. Dose reduction techniques were used.  CONTRAST: 131mL Isovue 370    FINDINGS:   LOWER CHEST: Partially imaged coronary artery calcifications.    HEPATOBILIARY: Normal.    PANCREAS: Normal.    SPLEEN: Normal.    ADRENAL GLANDS: Normal.    KIDNEYS/BLADDER: No significant mass, obstructing stone, or hydronephrosis. Focal wall thickening of the anterolateral  superior bladder wall adjacent to the pelvic fluid collection. No intraluminal gas.    BOWEL: Left-sided diverticulosis with extensive wall thickening and pericolonic fat stranding in the proximal and mid sigmoid colon. There is extensive surrounding extraluminal gas, as well as a multiloculated gas-containing fluid collection anterior and   inferior to the affected segment of sigmoid colon, which measures 8.8 x 5.6 x 4.7 cm (3/181, 6/68). A few loops of small bowel are in close proximity with mild reactive wall thickening. No small bowel dilation.    LYMPH NODES: Normal.    VASCULATURE: Unremarkable.    PELVIC ORGANS: Normal.    MUSCULOSKELETAL: Degenerative changes in the spine.      Impression    IMPRESSION:   1.  Acute sigmoid diverticulitis complicated by perforation with an adjacent gas containing fluid collection, most likely abscess.    2.  Localized wall thickening of the urinary bladder, likely secondary to the adjacent abscess. No intraluminal gas to suggest colovesical fistula.    [Critical Result: Perforated viscus]    Finding was identified on 2/18/2024 5:57 PM CST.     Dr. Vivian Montes was contacted by me on 2/18/2024 6:02 PM CST and verbalized understanding of the critical result.

## 2024-02-20 LAB
ANION GAP SERPL CALCULATED.3IONS-SCNC: 12 MMOL/L (ref 7–15)
BUN SERPL-MCNC: 9.6 MG/DL (ref 8–23)
CALCIUM SERPL-MCNC: 8.2 MG/DL (ref 8.8–10.2)
CHLORIDE SERPL-SCNC: 103 MMOL/L (ref 98–107)
CREAT SERPL-MCNC: 0.67 MG/DL (ref 0.67–1.17)
DEPRECATED HCO3 PLAS-SCNC: 24 MMOL/L (ref 22–29)
EGFRCR SERPLBLD CKD-EPI 2021: >90 ML/MIN/1.73M2
ERYTHROCYTE [DISTWIDTH] IN BLOOD BY AUTOMATED COUNT: 13.1 % (ref 10–15)
GLUCOSE SERPL-MCNC: 111 MG/DL (ref 70–99)
HCT VFR BLD AUTO: 34.7 % (ref 40–53)
HGB BLD-MCNC: 11.8 G/DL (ref 13.3–17.7)
MCH RBC QN AUTO: 29.7 PG (ref 26.5–33)
MCHC RBC AUTO-ENTMCNC: 34 G/DL (ref 31.5–36.5)
MCV RBC AUTO: 87 FL (ref 78–100)
PLATELET # BLD AUTO: 425 10E3/UL (ref 150–450)
POTASSIUM SERPL-SCNC: 3.3 MMOL/L (ref 3.4–5.3)
POTASSIUM SERPL-SCNC: 3.6 MMOL/L (ref 3.4–5.3)
RBC # BLD AUTO: 3.97 10E6/UL (ref 4.4–5.9)
SODIUM SERPL-SCNC: 139 MMOL/L (ref 135–145)
WBC # BLD AUTO: 10.8 10E3/UL (ref 4–11)

## 2024-02-20 PROCEDURE — 99222 1ST HOSP IP/OBS MODERATE 55: CPT | Performed by: PHYSICIAN ASSISTANT

## 2024-02-20 PROCEDURE — 36415 COLL VENOUS BLD VENIPUNCTURE: CPT | Performed by: INTERNAL MEDICINE

## 2024-02-20 PROCEDURE — 80048 BASIC METABOLIC PNL TOTAL CA: CPT | Performed by: INTERNAL MEDICINE

## 2024-02-20 PROCEDURE — 99232 SBSQ HOSP IP/OBS MODERATE 35: CPT | Performed by: INTERNAL MEDICINE

## 2024-02-20 PROCEDURE — 84132 ASSAY OF SERUM POTASSIUM: CPT | Performed by: INTERNAL MEDICINE

## 2024-02-20 PROCEDURE — 250N000013 HC RX MED GY IP 250 OP 250 PS 637: Performed by: INTERNAL MEDICINE

## 2024-02-20 PROCEDURE — 250N000011 HC RX IP 250 OP 636: Performed by: INTERNAL MEDICINE

## 2024-02-20 PROCEDURE — 85027 COMPLETE CBC AUTOMATED: CPT | Performed by: INTERNAL MEDICINE

## 2024-02-20 PROCEDURE — 120N000001 HC R&B MED SURG/OB

## 2024-02-20 PROCEDURE — 258N000003 HC RX IP 258 OP 636: Performed by: INTERNAL MEDICINE

## 2024-02-20 RX ORDER — POTASSIUM CHLORIDE 1500 MG/1
40 TABLET, EXTENDED RELEASE ORAL ONCE
Status: COMPLETED | OUTPATIENT
Start: 2024-02-20 | End: 2024-02-20

## 2024-02-20 RX ORDER — SODIUM CHLORIDE AND POTASSIUM CHLORIDE 150; 900 MG/100ML; MG/100ML
INJECTION, SOLUTION INTRAVENOUS CONTINUOUS
Status: DISCONTINUED | OUTPATIENT
Start: 2024-02-20 | End: 2024-02-21

## 2024-02-20 RX ORDER — PIPERACILLIN SODIUM, TAZOBACTAM SODIUM 3; .375 G/15ML; G/15ML
3.38 INJECTION, POWDER, LYOPHILIZED, FOR SOLUTION INTRAVENOUS EVERY 6 HOURS
Status: DISCONTINUED | OUTPATIENT
Start: 2024-02-20 | End: 2024-02-22 | Stop reason: ALTCHOICE

## 2024-02-20 RX ADMIN — SODIUM CHLORIDE: 9 INJECTION, SOLUTION INTRAVENOUS at 06:25

## 2024-02-20 RX ADMIN — PIPERACILLIN AND TAZOBACTAM 4.5 G: 4; .5 INJECTION, POWDER, FOR SOLUTION INTRAVENOUS at 06:23

## 2024-02-20 RX ADMIN — ACETAMINOPHEN 650 MG: 325 TABLET, FILM COATED ORAL at 15:17

## 2024-02-20 RX ADMIN — Medication 1 CAPSULE: at 22:08

## 2024-02-20 RX ADMIN — POTASSIUM CHLORIDE AND SODIUM CHLORIDE: 900; 150 INJECTION, SOLUTION INTRAVENOUS at 08:52

## 2024-02-20 RX ADMIN — OXYCODONE HYDROCHLORIDE 5 MG: 5 TABLET ORAL at 04:08

## 2024-02-20 RX ADMIN — ACETAMINOPHEN 650 MG: 325 TABLET, FILM COATED ORAL at 00:23

## 2024-02-20 RX ADMIN — POTASSIUM CHLORIDE AND SODIUM CHLORIDE: 900; 150 INJECTION, SOLUTION INTRAVENOUS at 23:23

## 2024-02-20 RX ADMIN — PIPERACILLIN AND TAZOBACTAM 4.5 G: 4; .5 INJECTION, POWDER, FOR SOLUTION INTRAVENOUS at 00:21

## 2024-02-20 RX ADMIN — POTASSIUM CHLORIDE 40 MEQ: 1500 TABLET, EXTENDED RELEASE ORAL at 08:51

## 2024-02-20 RX ADMIN — Medication 1 CAPSULE: at 08:44

## 2024-02-20 RX ADMIN — OXYCODONE HYDROCHLORIDE 5 MG: 5 TABLET ORAL at 08:52

## 2024-02-20 RX ADMIN — VANCOMYCIN HYDROCHLORIDE 125 MG: 125 CAPSULE ORAL at 20:51

## 2024-02-20 RX ADMIN — ACETAMINOPHEN 650 MG: 325 TABLET, FILM COATED ORAL at 04:08

## 2024-02-20 RX ADMIN — PIPERACILLIN AND TAZOBACTAM 3.38 G: 3; .375 INJECTION, POWDER, FOR SOLUTION INTRAVENOUS at 18:05

## 2024-02-20 RX ADMIN — OXYCODONE HYDROCHLORIDE 5 MG: 5 TABLET ORAL at 15:17

## 2024-02-20 RX ADMIN — PIPERACILLIN AND TAZOBACTAM 3.38 G: 3; .375 INJECTION, POWDER, FOR SOLUTION INTRAVENOUS at 13:23

## 2024-02-20 RX ADMIN — OXYCODONE HYDROCHLORIDE 5 MG: 5 TABLET ORAL at 20:51

## 2024-02-20 RX ADMIN — VANCOMYCIN HYDROCHLORIDE 125 MG: 125 CAPSULE ORAL at 08:44

## 2024-02-20 ASSESSMENT — ACTIVITIES OF DAILY LIVING (ADL)
ADLS_ACUITY_SCORE: 27
ADLS_ACUITY_SCORE: 27
ADLS_ACUITY_SCORE: 25
ADLS_ACUITY_SCORE: 25
ADLS_ACUITY_SCORE: 27
ADLS_ACUITY_SCORE: 25
ADLS_ACUITY_SCORE: 25
ADLS_ACUITY_SCORE: 27
ADLS_ACUITY_SCORE: 27
ADLS_ACUITY_SCORE: 25

## 2024-02-20 NOTE — PLAN OF CARE
POD #1 s/p drain placement. Diverticulitis w/ abscess.  Orientation: A&Ox4  ABNL VS/O2: VSS on RA  ABNL Labs: WBC 13.1, K+ recheck 3.8  Pain Management: PRN Oxy & Tylenol  Bowel/Bladder: Continent B&B, loose BMs less frequent this shift  Drains: PIV infusing NS at 100 ml/hr. Intermittent abx. MARIA ISABEL w/ pink tinged/purulent drainage.  Diet: NPO  Activity Level: SBA  Anticipated  DC Date: pending  Significant Information: Enteric precautions maintained.

## 2024-02-20 NOTE — PLAN OF CARE
Goal Outcome Evaluation:      Plan of Care Reviewed With: patient    Date & Time: 2/19/24 8719  Surgery/POD#: Abscess of sigmoid colon due to diverticulitis day 0 IR drain placed  Behavior & Aggression: calm cooperative   Fall Risk: yes  Orientation:alert and oriented  ABNL VS/O2:  ABNL Labs: K replaced   Pain Management:dilaudid for pain  Bowel/Bladder: frequent loose stools, good urine output  Drains: MARIA ISABEL drain  Diet:npo  Activity Level: up with stand by assist   Tests/Procedures:   Anticipated  DC Date:   Significant Information: drain placed today

## 2024-02-20 NOTE — PROGRESS NOTES
[facility]    Medicine Progress Note - Hospitalist Service    Date of Admission:  2/18/2024    Assessment & Plan     Assessment & Plan  Castillo Alba is a 68 year old male with history of C. difficile infection, prior diverticulitis presented to emergency department complaining of abdominal pain and diarrhea.  CT scan of the abdomen pelvis shows acute sigmoid diverticulitis complicated by perforation and likely abscess.     Principal Problem:    Diverticulitis of large intestine with perforation and abscess S/p IR guided drain placement on 2/19 per Colorectal surgery     History of C. difficile colitis consistent with colonization   Admit as inpatient  Zosyn 4.5 g IV every 8  Per Dr. Drain, CT images show that the fluid collection is amenable to percutaneous abscess.  She has a call out to IR.  IR consult requested  Colorectal surgery consult requested  S/p IR drain placement. Fluid culture results with proteus, e.coli, strep anginosus   On IV Zosyn  Started on clear liquid diet by colorectal on 2/20/2024  Most likely sinogram in 1 week as per colorectal surgery  Blood cultures remain negative    H/o recurrent c.diff     Patient has a history of antibiotic associated diarrhea; C. difficile toxin was positive on 12/18/2023, he was treated with metronidazole.  Discussed with Pharm.D.  Begin oral vancomycin for prophylaxis while on broad-spectrum antibiotics   C. difficile PCR is positive but GDH antigen and C. difficile toxin tests are negative. This should mean that patient does not have active C. difficile infection. Continue vancomycin 125 mg p.o. twice daily for C. difficile prophylaxis while on broad-spectrum antibiotics for diverticulitis with abscess.         Active Problems:    Hyponatremia resolved with IVF   Hypokalemia  Likely related to volume depletion from diarrhea  Treat with normal saline  Recheck BMP  -Replacement protocol started       Leukocytosis     Thrombocythemia  Follow-up blood counts  Wbc count improving         Pyuria  Urine culture with a  K mixture of urogenital nuha  As above, continue Zosyn        Tobacco use disorder  Nicotine replacement as needed           Diet: Clear liquid diet per colorectal surgery   DVT Prophylaxis: Pneumatic Compression Devices  Zamora Catheter: Not present  Lines: None     Cardiac Monitoring: None  Code Status:  Full        Clinically Significant Risk Factors Present on Admission               # Hypoalbuminemia: Lowest albumin = 3.1 g/dL at 2/18/2024  2:11 PM, will monitor as appropriate          # Obesity: Estimated body mass index is 35.26 kg/m  as calculated from the following:    Height as of this encounter: 1.829 m (6').    Weight as of this encounter: 117.9 kg (260 lb).                                 Clinically Significant Risk Factors        # Hypokalemia: Lowest K = 3.3 mmol/L in last 2 days, will replace as needed       # Hypoalbuminemia: Lowest albumin = 3.1 g/dL at 2/18/2024  2:11 PM, will monitor as appropriate            # Obesity: Estimated body mass index is 35.26 kg/m  as calculated from the following:    Height as of this encounter: 1.829 m (6').    Weight as of this encounter: 117.9 kg (260 lb)., PRESENT ON ADMISSION            Disposition Plan      Expected Discharge Date: 02/22/2024             In the 4-5days when OK with Colorectal and IR          Janis Anderson MD  Hospitalist Service  [facility]  Securely message with [employer] (more info)  Text page via [employer] Paging/Directory   ______________________________________________________________________    Interval History   Pt resting in bed.  Abdominal pain improving after IR drain placement.  Patient with some diarrhea since yesterday which is improving this morning.  No other acute issues    Physical Exam   Vital Signs: Temp: 98.1  F (36.7  C) Temp src: Oral BP: (!) 163/85 Pulse: 53   Resp: 16 SpO2: 97 % O2 Device: None  (Room air)    Weight: 260 lbs 0 oz    General Appearance: Alert, awake, NAD   Respiratory: CTA b/l   Cardiovascular: RRR  GI: soft, moderately distended, tender in lower abdomen, BS+   Skin: warm and dry   Other:      Medical Decision Making       49 MINUTES SPENT BY ME on the date of service doing chart review, history, exam, documentation & further activities per the note.      Data     I have personally reviewed the following data over the past 24 hrs:    10.8  \   11.8 (L)   / 425     139 103 9.6 /  111 (H)   3.3 (L) 24 0.67 \       Imaging results reviewed over the past 24 hrs:   No results found for this or any previous visit (from the past 24 hour(s)).

## 2024-02-20 NOTE — PROGRESS NOTES
COLON & RECTAL SURGERY  PROGRESS NOTE    February 20, 2024    SUBJECTIVE:  Pain and diarrhea improving. Denies n/v. Drain with 147cc out so far. AVSS. WBC 10.8 from 13.1.     OBJECTIVE:  Temp:  [97.7  F (36.5  C)-98.5  F (36.9  C)] 98.1  F (36.7  C)  Pulse:  [53-70] 53  Resp:  [15-17] 16  BP: (122-163)/(52-85) 163/85  SpO2:  [94 %-97 %] 97 %    Intake/Output Summary (Last 24 hours) at 2/20/2024 1023  Last data filed at 2/20/2024 0625  Gross per 24 hour   Intake 2780 ml   Output 597 ml   Net 2183 ml       GENERAL:  Awake, alert, no acute distress  HEAD: Normocephalic atraumatic  SCLERA: Anicteric  EXTREMITIES: Warm and well perfused  ABDOMEN:  Soft, tender with mild firmness/fullness in lower abdomen. No guarding, rigidity, or peritoneal signs. LLQ drain with cloudy serosang output.    LABS:  Lab Results   Component Value Date    WBC 10.8 02/20/2024    WBC 6.6 10/17/2019     Lab Results   Component Value Date    HGB 11.8 02/20/2024    HGB 17.2 10/17/2019     Lab Results   Component Value Date    HCT 34.7 02/20/2024    HCT 49.0 10/17/2019     Lab Results   Component Value Date     02/20/2024     10/17/2019     Last Basic Metabolic Panel:  Lab Results   Component Value Date     02/20/2024     10/17/2019      Lab Results   Component Value Date    POTASSIUM 3.3 02/20/2024    POTASSIUM 3.8 10/17/2019     Lab Results   Component Value Date    CHLORIDE 103 02/20/2024    CHLORIDE 106 10/17/2019     Lab Results   Component Value Date    LENORE 8.2 02/20/2024    LENORE 9.3 10/17/2019     Lab Results   Component Value Date    CO2 24 02/20/2024    CO2 26 10/17/2019     Lab Results   Component Value Date    BUN 9.6 02/20/2024    BUN 17 10/17/2019     Lab Results   Component Value Date    CR 0.67 02/20/2024    CR 0.84 10/17/2019     Lab Results   Component Value Date     02/20/2024     10/17/2019       ASSESSMENT/PLAN: 68yoM with h/o C. difficile infection treated in December 2023 and 5-6 lifetime  episodes of uncomplicated diverticulitis over the last 15 to 20 years who presents with acute sigmoid diverticulitis with an adjacent large 8.8 cm pericolonic abscess. Labs are consistent with C diff colonization without active toxin production. Now s/p IR drain placement on 2/19.    - Clear liquid diet  - PRN pain meds  - Continue Zosyn, await drain cultures. Agree with prophylactic PO vanco.  - OOB, ambulate  - Continue drain, monitor/record output, tentatively plan for a sinogram in ~7 days    Discussed with Dr. Cruz.    For questions/paging, please contact the CRS office at [phone].    Minesh Doherty PA-C  Colorectal Physician Assistant    [facility]  [address] [address]  T: [phone]  F: [phone]

## 2024-02-20 NOTE — PROGRESS NOTES
Interventional Radiology Progress Note:  Inpatient at [facility]  Date: February 20, 2024     History: Castillo Alba is a 68 year old male with history of C. difficile infection, prior diverticulitis presented to emergency department complaining of abdominal pain and diarrhea.  CT scan of the abdomen pelvis shows acute sigmoid diverticulitis complicated by perforation and likely abscess      He is s/p LLQ drain placement 2/19/24 with good outputs since. Kuldeep is being seen in IR follow up today.     Interval History: Feeling ok. Pain 5/10. Not getting enough sleepy, lots of interruptions    Physical Exam:   Vitals:Temp:  [97.7  F (36.5  C)-98.5  F (36.9  C)] 98.1  F (36.7  C)  Pulse:  [53-70] 53  Resp:  [15-17] 16  BP: (122-163)/(52-85) 163/85  SpO2:  [94 %-97 %] 97 %    General: Pleasant, arousable, sleepy male in no acute distress.    Neuro:  A&O x 4. Moves all extremities equally.  Resp:  Normal respirations on room air. Non labored breathing. Equal air entry B/L   Abdomen:  Soft, obese,     LLQ Drain:   -Dressing has some dried old blood on it, D/I.   -Tube was flushed easily with 20 mL NS without pain or leaking, aspirated back ~ 5 mL only    -Tube is draining serosanguinous to brown fluid.     Outputs    2/19 115 mL; 2/20 30 mL so far today    Gram stain:   4+ Proteus mirabilis Abnormal    Identification is preliminary, confirmation in progress   2+ Escherichia coli Abnormal    Identification is preliminary, confirmation in progress   3+ Streptococcus anginosus Abnormal      Labs:  ROUTINE ICU LABS (Last four results)  CMP  Recent Labs   Lab 02/20/24  0710 02/19/24  1716 02/19/24  0642 02/18/24  1411     --  137 131*   POTASSIUM 3.3* 3.8 3.3* 3.5   CHLORIDE 103  --  101 92*   CO2 24  --  24 27   ANIONGAP 12  --  12 12   *  --  140* 175*   BUN 9.6  --  10.0 10.2   CR 0.67  --  0.78 0.89   GFRESTIMATED >90  --  >90 >90   LENORE 8.2*  --  8.2* 8.8   PROTTOTAL  --   --   --  7.2    ALBUMIN  --   --   --  3.1*   BILITOTAL  --   --   --  0.6   ALKPHOS  --   --   --  146   AST  --   --   --  29   ALT  --   --   --  30     CBC  Recent Labs   Lab 02/20/24  0710 02/19/24  0642 02/18/24  1411   WBC 10.8 13.1* 16.0*   RBC 3.97* 4.03* 4.76   HGB 11.8* 12.0* 14.0   HCT 34.7* 35.1* 41.4   MCV 87 87 87   MCH 29.7 29.8 29.4   MCHC 34.0 34.2 33.8   RDW 13.1 13.0 12.7    475* 581*     Assessment: Improved since drainage and antibiotics, tired, WBC normalizing    Plan: Continue with drainage, monitoring outputs, IR will follow    Total time spent on the date of the encounter is 30 minutes, including time spent counseling the patient, performing a medically appropriate evaluation, reviewing prior medical history, ordering medications and tests, documenting clinical information in the medical record, and communication of results.    Thanks [facility] Interventional Radiology CNP ([phone]) (phone [phone])

## 2024-02-21 LAB — POTASSIUM SERPL-SCNC: 3.7 MMOL/L (ref 3.4–5.3)

## 2024-02-21 PROCEDURE — 250N000013 HC RX MED GY IP 250 OP 250 PS 637: Performed by: INTERNAL MEDICINE

## 2024-02-21 PROCEDURE — 120N000001 HC R&B MED SURG/OB

## 2024-02-21 PROCEDURE — 99232 SBSQ HOSP IP/OBS MODERATE 35: CPT | Performed by: INTERNAL MEDICINE

## 2024-02-21 PROCEDURE — 36415 COLL VENOUS BLD VENIPUNCTURE: CPT | Performed by: INTERNAL MEDICINE

## 2024-02-21 PROCEDURE — 250N000011 HC RX IP 250 OP 636: Performed by: INTERNAL MEDICINE

## 2024-02-21 PROCEDURE — 99232 SBSQ HOSP IP/OBS MODERATE 35: CPT | Performed by: PHYSICIAN ASSISTANT

## 2024-02-21 PROCEDURE — 84132 ASSAY OF SERUM POTASSIUM: CPT | Performed by: INTERNAL MEDICINE

## 2024-02-21 RX ADMIN — PIPERACILLIN AND TAZOBACTAM 3.38 G: 3; .375 INJECTION, POWDER, FOR SOLUTION INTRAVENOUS at 13:25

## 2024-02-21 RX ADMIN — VANCOMYCIN HYDROCHLORIDE 125 MG: 125 CAPSULE ORAL at 09:00

## 2024-02-21 RX ADMIN — PIPERACILLIN AND TAZOBACTAM 3.38 G: 3; .375 INJECTION, POWDER, FOR SOLUTION INTRAVENOUS at 06:18

## 2024-02-21 RX ADMIN — Medication 1 CAPSULE: at 08:59

## 2024-02-21 RX ADMIN — OXYCODONE HYDROCHLORIDE 5 MG: 5 TABLET ORAL at 04:02

## 2024-02-21 RX ADMIN — VANCOMYCIN HYDROCHLORIDE 125 MG: 125 CAPSULE ORAL at 21:22

## 2024-02-21 RX ADMIN — PIPERACILLIN AND TAZOBACTAM 3.38 G: 3; .375 INJECTION, POWDER, FOR SOLUTION INTRAVENOUS at 01:08

## 2024-02-21 RX ADMIN — Medication 1 CAPSULE: at 21:22

## 2024-02-21 RX ADMIN — PIPERACILLIN AND TAZOBACTAM 3.38 G: 3; .375 INJECTION, POWDER, FOR SOLUTION INTRAVENOUS at 18:27

## 2024-02-21 ASSESSMENT — ACTIVITIES OF DAILY LIVING (ADL)
ADLS_ACUITY_SCORE: 25

## 2024-02-21 NOTE — PLAN OF CARE
Date & Time: 2/20/24 NOC  Surgery/POD#:   Behavior & Aggression: IR drainage of abscess diverticulitis   Fall Risk: no  Orientation:A&Ox4  ABNL VS/O2:  ABNL Labs: K replaced, redraw in am  Pain Management:oxycodone given with good results  Bowel/Bladder: Continent  Drains: MARIA ISABEL drain L abd  flushed per orders   Diet: Clear liquid poor appetite   Activity Level: up with stand by assist   Tests/Procedures:   Anticipated  DC Date:   Significant Information: pt forgetful at times to how many BM's he had this shift.

## 2024-02-21 NOTE — PROGRESS NOTES
[facility]    Infectious Disease Progress Note    Date of Service (when I saw the patient): 02/21/2024     Assessment & Plan   Castillo Alba is a 68 year old who was admitted on 2/18/2024.     Impression:  69 yo male with prior history of c.diff in 12/2023 admitted now with abdominal pain and diarrhea and noted to have perforated diverticulitis.     -S/p drain placement 2/19/24. Cultures growing Strep anginosus, E.coli, and proteus mirabilis so far.  -On Zosyn.   -Afebrile. Leukocytosis resolved.   -Recent history of c.diff colitis in 12/2023. Started on prophylactic dosing oral vancomycin with c.diff studies this admission showing colonization.         Recommendations:   -Continue Zosyn pending final culture results/sensitivities.   -Drain management per IR. Anticipate follow-up imaging in about one week or when drain outputs decrease.   -Continue prophylactic oral Vancomycin dosing given recent history of C.diff colitis. He should continue this for 5 days after completion of antibiotics for his diverticulitis.   -Colorectal planning elective surgical resection in 6-8 weeks if he does well with conservative treatment for now.       Collette Mast PA-C    Interval History   Tolerating antibiotics ok   Sleeping and comfortable.   Labs reviewed         Physical Exam   Temp: 98.2  F (36.8  C) Temp src: Oral BP: (!) 142/65 Pulse: 53   Resp: 16 SpO2: 95 % O2 Device: None (Room air)    Vitals:    02/18/24 1359   Weight: 117.9 kg (260 lb)     Vital Signs with Ranges  Temp:  [98.2  F (36.8  C)-98.4  F (36.9  C)] 98.2  F (36.8  C)  Pulse:  [53-59] 53  Resp:  [16] 16  BP: (137-142)/(60-65) 142/65  SpO2:  [95 %-97 %] 95 %    Constitutional: Sleeping, comfortable, no apparent distress  Lungs: Clear to auscultation bilaterally, no crackles or wheezing  Cardiovascular: Regular rate and rhythm, normal S1 and S2, and no murmur noted  Abdomen: Normal bowel sounds, soft, non-distended, non-tender. Drain  with cloudy, tan output.   Skin: No rashes, no cyanosis, no edema  Other:    Medications    0.9% sodium chloride + KCl 20 mEq/L 75 mL/hr at 02/20/24 2323      lactobacillus rhamnosus (GG)  1 capsule Oral BID    nicotine   Transdermal Q8H    piperacillin-tazobactam  3.375 g Intravenous Q6H    sodium chloride (PF)  10 mL Irrigation Q8H    sodium chloride (PF)  3 mL Intracatheter Q8H    sodium chloride (PF)  3 mL Intracatheter Q8H    vancomycin  125 mg Oral BID       Data   All microbiology laboratory data reviewed.  Recent Labs   Lab Test 02/20/24  0710 02/19/24  0642 02/18/24  1411   WBC 10.8 13.1* 16.0*   HGB 11.8* 12.0* 14.0   HCT 34.7* 35.1* 41.4   MCV 87 87 87    475* 581*     Recent Labs   Lab Test 02/20/24  0710 02/19/24  0642 02/18/24  1411   CR 0.67 0.78 0.89     02/19/2024 1035 02/20/2024 1347 Anaerobic Bacterial Culture Routine [[id number]]   Abscess from Abdomen    Preliminary result Component Value   Culture No anaerobic organisms isolated after 1 day P             02/19/2024 1035 02/19/2024 1418 Gram Stain [[id number]]   (Abnormal)   Abscess from Abdomen    Final result Component Value   Gram Stain Result 4+ Gram positive cocci Abnormal    Gram Stain Result 3+ Gram positive bacilli Abnormal    Gram Stain Result 3+ Gram negative bacilli Abnormal    Gram Stain Result 4+ WBC seen Abnormal    Predominantly PMNs          02/19/2024 1035 02/21/2024 0758 Abscess Aerobic Bacterial Culture Routine [[id number]]   (Abnormal)   Abscess from Abdomen    Preliminary result Component Value   Culture Culture in progress P    4+ Proteus mirabilis Abnormal  P    2+ Escherichia coli Abnormal  P    3+ Streptococcus anginosus Abnormal  P    This organism is susceptible to ampicillin, penicillin, vancomycin and the cephalosporins. If treatment is required and your patient is allergic to penicillin, contact the microbiology lab within 5 days to request susceptibility testing.             02/18/2024 1827 02/20/2024  2246 Blood Culture Peripheral Blood [[id number]]   Peripheral Blood    Preliminary result Component Value   Culture No growth after 2 days P             02/18/2024 1821 02/20/2024 2246 Blood Culture Peripheral Blood [[id number]]   Peripheral Blood    Preliminary result Component Value   Culture No growth after 2 days P             02/18/2024 1714 02/18/2024 2144 Enteric Bacteria and Virus Panel PCR [[id number]]    Stool from Per Rectum    Final result Component Value   Campylobacter species Negative   Salmonella species Negative   Vibrio species Negative   Vibrio cholerae Negative   Yersinia enterocolitica Negative   Enteropathogenic E. coli (EPEC) Negative   Shiga-like toxin-producing E. coli (STEC) Negative   Shigella/Enteroinvasive E. coli (EIEC) Negative   Cryptosporidium species Negative   Giardia lamblia Negative   Norovirus Gl/Gll Negative   Rotavirus A Negative   Plesiomonas shigelloides Negative   Enteroaggregative E. coli (EAEC) Negative   Enterotoxigenic E. coli (ETEC) Negative   E. coli O157 NA   Cyclospora cayetanensis Negative   Entamoeba histolytica Negative   Adenovirus F40/41 Negative   Astrovirus Negative   Sapovirus Negative          02/18/2024 1714 02/18/2024 2104 C. difficile Toxin B PCR with reflex to C. difficile Antigen and Toxins A/B EIA [[id number]]    (Abnormal)   Stool from Per Rectum    Final result Component Value   C Difficile Toxin B by PCR Positive Abnormal    Detection of C. difficile nucleic acid in stools confirms the presence of these organisms in diarrheal patients but may not indicate that C. difficile is the etiologic agent of the diarrhea. Results from the Xpert C. difficile assay should be interpreted in conjunction with other laboratory and clinical data available to the clinician.    Patients with a positive C. difficile PCR result will receive reflex GDH/Toxin Immunoassay testing. Please interpret the PCR test result in conjunction with GDH/Toxin Immunoassay  results and the clinical status of patient.          02/18/2024 1714 02/18/2024 2146 C. difficile Antigen and Toxins A/B by Enzyme Immunoassay [[id number]]    Stool from Per Rectum    Final result Component Value   C. difficile GDH Antigen Negative   C. difficile Toxin Negative          02/18/2024 1625 02/19/2024 1437 Urine Culture [[id number]]   Urine, Midstream    Final result Component Value   Culture 50,000-100,000 CFU/mL Mixture of Urogenital Irene           EXAM: CT ABDOMEN PELVIS W CONTRAST  LOCATION: [facility]  DATE: 2/18/2024    INDICATION: check for Diverticulitis, abscess, c diff colitis  LLQ pain and diarrhea  COMPARISON: None.  TECHNIQUE: CT scan of the abdomen and pelvis was performed following injection of IV contrast. Multiplanar reformats were obtained. Dose reduction techniques were used.  CONTRAST: 131mL Isovue 370    FINDINGS:  LOWER CHEST: Partially imaged coronary artery calcifications.    HEPATOBILIARY: Normal.    PANCREAS: Normal.    SPLEEN: Normal.    ADRENAL GLANDS: Normal.    KIDNEYS/BLADDER: No significant mass, obstructing stone, or hydronephrosis. Focal wall thickening of the anterolateral superior bladder wall adjacent to the pelvic fluid collection. No intraluminal gas.    BOWEL: Left-sided diverticulosis with extensive wall thickening and pericolonic fat stranding in the proximal and mid sigmoid colon. There is extensive surrounding extraluminal gas, as well as a multiloculated gas-containing fluid collection anterior and   inferior to the affected segment of sigmoid colon, which measures 8.8 x 5.6 x 4.7 cm (3/181, 6/68). A few loops of small bowel are in close proximity with mild reactive wall thickening. No small bowel dilation.    LYMPH NODES: Normal.    VASCULATURE: Unremarkable.    PELVIC ORGANS: Normal.    MUSCULOSKELETAL: Degenerative changes in the spine.   Impression:     IMPRESSION:  1.  Acute sigmoid diverticulitis complicated by perforation  with an adjacent gas containing fluid collection, most likely abscess.    2.  Localized wall thickening of the urinary bladder, likely secondary to the adjacent abscess. No intraluminal gas to suggest colovesical fistula.

## 2024-02-21 NOTE — PROGRESS NOTES
[facility]    Medicine Progress Note - Hospitalist Service    Date of Admission:  2/18/2024    Assessment & Plan     Assessment & Plan  Castillo Alba is a 68 year old male with history of C. difficile infection, prior diverticulitis presented to emergency department complaining of abdominal pain and diarrhea.  CT scan of the abdomen pelvis shows acute sigmoid diverticulitis complicated by perforation and likely abscess.     Principal Problem:    Diverticulitis of large intestine with perforation and abscess S/p IR guided drain placement on 2/19 per Colorectal surgery     History of C. difficile colitis consistent with colonization   Admitted  as inpatient  Zosyn IV   Per  Drain, CT images show that the fluid collection is amenable to percutaneous abscess.  She has a call out to IR.  IR consult requested  Colorectal surgery consult requested  S/p IR drain placement. Fluid culture results with proteus, e.coli, strep anginosus   On IV Zosyn  On full liquid diet   Most likely sinogram in 1 week as per colorectal surgery  Blood cultures remain negative  ID consulted and are following   IVF stopped on 2/21/24     H/o recurrent c.diff with chronic c.diff colonization     Patient has a history of antibiotic associated diarrhea; C. difficile toxin was positive on 12/18/2023, he was treated with metronidazole.  on admission Discussed with Pharm.D.  Begin oral vancomycin for prophylaxis while on broad-spectrum antibiotics   C. difficile PCR is positive but GDH antigen and C. difficile toxin tests are negative. This should mean that patient does not have active C. difficile infection. Continue vancomycin 125 mg p.o. twice daily for C. difficile prophylaxis while on broad-spectrum antibiotics for diverticulitis with abscess.         Active Problems:    Hyponatremia resolved with IVF   Hypokalemia  Likely related to volume depletion from diarrhea  Treat with normal saline  Recheck  BMP  -Replacement protocol started       Leukocytosis    Thrombocythemia  Follow-up blood counts  Wbc count improving         Pyuria  Urine culture with a  K mixture of urogenital nuha  As above, continue Zosyn        Tobacco use disorder  Nicotine replacement as needed           Diet: Clear liquid diet per colorectal surgery   DVT Prophylaxis: Pneumatic Compression Devices  Zamora Catheter: Not present  Lines: None     Cardiac Monitoring: None  Code Status:  Full        Clinically Significant Risk Factors Present on Admission               # Hypoalbuminemia: Lowest albumin = 3.1 g/dL at 2/18/2024  2:11 PM, will monitor as appropriate          # Obesity: Estimated body mass index is 35.26 kg/m  as calculated from the following:    Height as of this encounter: 1.829 m (6').    Weight as of this encounter: 117.9 kg (260 lb).                                 Clinically Significant Risk Factors        # Hypokalemia: Lowest K = 3.3 mmol/L in last 2 days, will replace as needed       # Hypoalbuminemia: Lowest albumin = 3.1 g/dL at 2/18/2024  2:11 PM, will monitor as appropriate            # Obesity: Estimated body mass index is 35.26 kg/m  as calculated from the following:    Height as of this encounter: 1.829 m (6').    Weight as of this encounter: 117.9 kg (260 lb)., PRESENT ON ADMISSION            Disposition Plan      Expected Discharge Date: 02/22/2024             In the 4-5days when OK with Colorectal and IR      Wife updated on 2/20/24   Discussed with bedside RN and Pt on 2/21/24     Janis Anderson MD  Hospitalist Service  [facility]  Securely message with [employer] (more info)  Text page via [employer] Paging/Directory   ______________________________________________________________________    Interval History   Pt resting in bed.  Abdominal pain improving after IR drain placement.  No n/v, or diarroea.  No other acute issues    Physical Exam   Vital Signs: Temp: 98.2  F (36.8  C) Temp src:  Oral BP: (!) 142/65 Pulse: 53   Resp: 16 SpO2: 95 % O2 Device: None (Room air)    Weight: 260 lbs 0 oz    General Appearance: Alert, awake, NAD   Respiratory: CTA b/l   Cardiovascular: RRR  GI: soft, moderately distended, tender in lower abdomen, BS+   Skin: warm and dry   Other:      Medical Decision Making       49 MINUTES SPENT BY ME on the date of service doing chart review, history, exam, documentation & further activities per the note.      Data     I have personally reviewed the following data over the past 24 hrs:    N/A  \   N/A   / N/A     N/A N/A N/A /  N/A   3.7 N/A N/A \       Imaging results reviewed over the past 24 hrs:   No results found for this or any previous visit (from the past 24 hour(s)).

## 2024-02-21 NOTE — PLAN OF CARE
Goal Outcome Evaluation:       Date & Time: 2/20/24 1900  Surgery/POD#:   Behavior & Aggression: IR drainage of abscess diverticulitis   Fall Risk: no  Orientation:alert and oriented  ABNL VS/O2:  ABNL Labs: K replaced, redraw in am  Pain Management:oxycodone given with good results  Bowel/Bladder: good urine output, +BM this shift   Drains: MARIA ISABEL drain L abd  flushed per orders   Diet:clear liquid poor appetite   Activity Level: up with stand by assist   Tests/Procedures:   Anticipated  DC Date:   Significant Information: pt forgetful at times to how many BM's he had this shift.

## 2024-02-21 NOTE — PROGRESS NOTES
COLON & RECTAL SURGERY  PROGRESS NOTE    February 21, 2024    SUBJECTIVE:  Doing about the same as yesterday. Pain controlled. Tolerating clears, denies N/V. Drain with 65 ml output in 24 hours. Had one small formed BM this morning without blood. AVSS.    OBJECTIVE:  Temp:  [98.2  F (36.8  C)-98.4  F (36.9  C)] 98.2  F (36.8  C)  Pulse:  [53-59] 53  Resp:  [16] 16  BP: (137-142)/(60-65) 142/65  SpO2:  [95 %-97 %] 95 %    Intake/Output Summary (Last 24 hours) at 2/21/2024 1054  Last data filed at 2/21/2024 0622  Gross per 24 hour   Intake 2691 ml   Output 1265 ml   Net 1426 ml       GENERAL:  Awake, alert, no acute distress, lying in bed  HEAD: Nomocephalic atraumatic  SCLERA: anicteric  EXTREMITIES: warm and well perfused  ABDOMEN:  Soft, round, mild tenderness near drain site, no rebound or guarding, no peritoneal signs. IR drain with turbid output.      LABS:  Lab Results   Component Value Date    WBC 10.8 02/20/2024    WBC 6.6 10/17/2019     Lab Results   Component Value Date    HGB 11.8 02/20/2024    HGB 17.2 10/17/2019     Lab Results   Component Value Date    HCT 34.7 02/20/2024    HCT 49.0 10/17/2019     Lab Results   Component Value Date     02/20/2024     10/17/2019     Last Basic Metabolic Panel:  Lab Results   Component Value Date     02/20/2024     10/17/2019      Lab Results   Component Value Date    POTASSIUM 3.7 02/21/2024    POTASSIUM 3.8 10/17/2019     Lab Results   Component Value Date    CHLORIDE 103 02/20/2024    CHLORIDE 106 10/17/2019     Lab Results   Component Value Date    LENORE 8.2 02/20/2024    LENORE 9.3 10/17/2019     Lab Results   Component Value Date    CO2 24 02/20/2024    CO2 26 10/17/2019     Lab Results   Component Value Date    BUN 9.6 02/20/2024    BUN 17 10/17/2019     Lab Results   Component Value Date    CR 0.67 02/20/2024    CR 0.84 10/17/2019     Lab Results   Component Value Date     02/20/2024     10/17/2019       ASSESSMENT/PLAN: 68yoM  with h/o C. difficile infection treated in December 2023 and 5-6 lifetime episodes of uncomplicated diverticulitis over the last 15 to 20 years who presents with acute sigmoid diverticulitis with an adjacent large 8.8 cm pericolonic abscess. Labs are consistent with C diff colonization without active toxin production. Now s/p IR drain placement on 2/19.     - Advance to fulls  - PRN pain meds  - Continue Zosyn, Agree with prophylactic PO vanco.  - OOB, ambulate  - Continue drain, monitor/record output, tentatively plan for a sinogram in ~7 days     Discussed with Dr. Cruz.        For questions/paging, please contact the CRS office at [phone].    Rayo Sanders PA-C  [facility]  Phone: [phone]

## 2024-02-21 NOTE — PLAN OF CARE
Summary: Sigmoid diverticulitis complicated by perforation and likely abscess.   Date & Time: 2/20/24 NOC  Surgery/POD#: 2  Behavior & Aggression: IR drainage of abscess diverticulitis   Fall Risk: no  Orientation:A&Ox4  ABNL VS/O2:  ABNL Labs: K  recheck am  Pain Management:oxycodone given x2  Bowel/Bladder: Continent B&B, up to use BR x2 no bm   Drains: MARIA ISABEL drain L abd  flushed per orders   Diet: Clear liquid poor appetite   Activity Level: up with stand by assist   Tests/Procedures: none  Anticipated  DC Date: Pending  Significant Information: pt forgetful at times.

## 2024-02-22 LAB
BACTERIA ABSC ANAEROBE+AEROBE CULT: ABNORMAL
POTASSIUM SERPL-SCNC: 3.5 MMOL/L (ref 3.4–5.3)

## 2024-02-22 PROCEDURE — 250N000011 HC RX IP 250 OP 636: Performed by: INTERNAL MEDICINE

## 2024-02-22 PROCEDURE — 250N000013 HC RX MED GY IP 250 OP 250 PS 637: Performed by: INTERNAL MEDICINE

## 2024-02-22 PROCEDURE — 84132 ASSAY OF SERUM POTASSIUM: CPT | Performed by: INTERNAL MEDICINE

## 2024-02-22 PROCEDURE — 99232 SBSQ HOSP IP/OBS MODERATE 35: CPT | Performed by: PHYSICIAN ASSISTANT

## 2024-02-22 PROCEDURE — 250N000011 HC RX IP 250 OP 636: Performed by: PHYSICIAN ASSISTANT

## 2024-02-22 PROCEDURE — 999N000147 HC STATISTIC PT IP EVAL DEFER: Performed by: PHYSICAL THERAPIST

## 2024-02-22 PROCEDURE — 120N000001 HC R&B MED SURG/OB

## 2024-02-22 PROCEDURE — 36415 COLL VENOUS BLD VENIPUNCTURE: CPT | Performed by: INTERNAL MEDICINE

## 2024-02-22 RX ORDER — AMPICILLIN AND SULBACTAM 2; 1 G/1; G/1
3 INJECTION, POWDER, FOR SOLUTION INTRAMUSCULAR; INTRAVENOUS EVERY 6 HOURS
Status: DISCONTINUED | OUTPATIENT
Start: 2024-02-22 | End: 2024-02-25

## 2024-02-22 RX ADMIN — AMPICILLIN SODIUM AND SULBACTAM SODIUM 3 G: 2; 1 INJECTION, POWDER, FOR SOLUTION INTRAMUSCULAR; INTRAVENOUS at 11:36

## 2024-02-22 RX ADMIN — AMPICILLIN SODIUM AND SULBACTAM SODIUM 3 G: 2; 1 INJECTION, POWDER, FOR SOLUTION INTRAMUSCULAR; INTRAVENOUS at 17:49

## 2024-02-22 RX ADMIN — PIPERACILLIN AND TAZOBACTAM 3.38 G: 3; .375 INJECTION, POWDER, FOR SOLUTION INTRAVENOUS at 06:11

## 2024-02-22 RX ADMIN — Medication 1 CAPSULE: at 08:36

## 2024-02-22 RX ADMIN — VANCOMYCIN HYDROCHLORIDE 125 MG: 125 CAPSULE ORAL at 08:36

## 2024-02-22 RX ADMIN — VANCOMYCIN HYDROCHLORIDE 125 MG: 125 CAPSULE ORAL at 20:39

## 2024-02-22 RX ADMIN — Medication 1 CAPSULE: at 20:39

## 2024-02-22 RX ADMIN — PIPERACILLIN AND TAZOBACTAM 3.38 G: 3; .375 INJECTION, POWDER, FOR SOLUTION INTRAVENOUS at 00:44

## 2024-02-22 ASSESSMENT — ACTIVITIES OF DAILY LIVING (ADL)
ADLS_ACUITY_SCORE: 25
ADLS_ACUITY_SCORE: 24
ADLS_ACUITY_SCORE: 25
ADLS_ACUITY_SCORE: 24
ADLS_ACUITY_SCORE: 24
ADLS_ACUITY_SCORE: 25
ADLS_ACUITY_SCORE: 24
ADLS_ACUITY_SCORE: 25
ADLS_ACUITY_SCORE: 24
ADLS_ACUITY_SCORE: 24
ADLS_ACUITY_SCORE: 25
ADLS_ACUITY_SCORE: 24
ADLS_ACUITY_SCORE: 25

## 2024-02-22 NOTE — PROGRESS NOTES
COLON & RECTAL SURGERY  PROGRESS NOTE    02/22/2024    SUBJECTIVE: Feels better this morning, yesterday was a good day. Has minimal abdominal pain at rest. Found it hard to sleep last night. No nausea and tolerating full liquid diet. Still having some diarrhea.    OBJECTIVE:  Temp:  [97.4  F (36.3  C)-98.2  F (36.8  C)] 97.4  F (36.3  C)  Pulse:  [52-56] 52  Resp:  [16] 16  BP: (129-148)/(65-76) 134/66  SpO2:  [95 %-97 %] 96 %    Intake/Output Summary (Last 24 hours) at 2/21/2024 1054  Last data filed at 2/21/2024 0622  Gross per 24 hour   Intake 2691 ml   Output 1265 ml   Net 1426 ml       GENERAL:  Awake, alert, no acute distress, lying in bed  HEAD: Nomocephalic atraumatic  SCLERA: anicteric  EXTREMITIES: warm and well perfused  ABDOMEN: soft, obese, nondistended, mildly tender in LLQ around IR drain site with mild redness and fullness of soft tissue surrounding drain. Scant brown purulent fluid leaking around drain. Brown purulent fluid in drain bulb.      LABS:  Lab Results   Component Value Date    WBC 10.8 02/20/2024    WBC 6.6 10/17/2019     Lab Results   Component Value Date    HGB 11.8 02/20/2024    HGB 17.2 10/17/2019     Lab Results   Component Value Date    HCT 34.7 02/20/2024    HCT 49.0 10/17/2019     Lab Results   Component Value Date     02/20/2024     10/17/2019     Last Basic Metabolic Panel:  Lab Results   Component Value Date     02/20/2024     10/17/2019      Lab Results   Component Value Date    POTASSIUM 3.7 02/21/2024    POTASSIUM 3.8 10/17/2019     Lab Results   Component Value Date    CHLORIDE 103 02/20/2024    CHLORIDE 106 10/17/2019     Lab Results   Component Value Date    LENORE 8.2 02/20/2024    LENORE 9.3 10/17/2019     Lab Results   Component Value Date    CO2 24 02/20/2024    CO2 26 10/17/2019     Lab Results   Component Value Date    BUN 9.6 02/20/2024    BUN 17 10/17/2019     Lab Results   Component Value Date    CR 0.67 02/20/2024    CR 0.84 10/17/2019      Lab Results   Component Value Date     02/20/2024     10/17/2019       ASSESSMENT/PLAN: 68yoM with h/o C. difficile infection treated in December 2023 and 5-6 lifetime episodes of uncomplicated diverticulitis over the last 15 to 20 years who presents with acute sigmoid diverticulitis with an adjacent large 8.8 cm pericolonic abscess. Labs are consistent with C diff colonization without active toxin production. Now s/p IR drain placement on 2/19.    Improving well after IR drain placement, with decreased pain. Tolerating full liquid diet. Having some redness and fullness around IR drain site this morning, difficult to tell if this is because he slept on his left side all night vs developing abdominal wall abscess/cellulitis around the IR drain site.    - advance to low fiber diet  - continue to monitor abd wall around IR drain. If increasing pain/swelling/redness in that area, low threshold to repeat CT abd/pelvis to assess for IR drain malposition or development of abdominal wall abscess  - PRN pain meds  - Continue Zosyn, Agree with prophylactic PO vanco for C diff colonization  - OOB, ambulate  - Continue drain, monitor/record output, tentatively plan for a sinogram in ~7 days     Discussed with Dr. Joya.    Ken Bhakta MD, MS  Fellow, Colon & Rectal Surgery  [facility]  02/22/2024  7:48 AM    Colorectal Surgery can be reached at [phone] at all times. Between 5pm and 7am you will be connected to the on-call physician

## 2024-02-22 NOTE — PROGRESS NOTES
Patient POD# 2 from Sigmoid diverticulitis complicated by perforation and likely abscess, DCI, MARIA ISABEL in place, Irrigation Q8hrs with 10mL. A&Ox4. VSS, ex HTN.  Patient on Full liquid diet and tolerated. Passing Flatus, X2 Loose stool. On Protocol K+ 3.7. PIV SL with intermittent abx. Up SBA.  Will continue to monitor.

## 2024-02-22 NOTE — PLAN OF CARE
Goal Outcome Evaluation:         Pt alert and orientated x 4 has been up walking in room and in chair. Denies need for pain medication on this shift. Gillian drain to LLQ patent with purulent drainage. Noted skin around insertion site reddened and area marked no increase.dressing changed around gillian drain per order.  Afebrile remains in enteric isolation for c-diff.

## 2024-02-22 NOTE — PROGRESS NOTES
[facility]    Infectious Disease Progress Note    Date of Service (when I saw the patient): 02/22/2024     Assessment & Plan   Castillo Alba is a 68 year old who was admitted on 2/18/2024.     Impression:  69 yo male with prior history of c.diff in 12/2023 admitted now with abdominal pain and diarrhea and noted to have perforated diverticulitis.     -S/p drain placement 2/19/24. Cultures growing Strep anginosus, pan-sensitive E.coli, pan-sensitive proteus mirabilis, bacteroides, and parabacteroides  -On Zosyn.   -Afebrile. Leukocytosis resolved.   -Recent history of c.diff colitis in 12/2023. Started on prophylactic dosing oral vancomycin with c.diff studies this admission showing colonization.         Recommendations:   -Change Zosyn to Unasyn.   -Drain management per IR. Await repeat imaging, likely in next few days.  -Continue prophylactic oral Vancomycin dosing given recent history of C.diff colitis. He should continue this for 5 days after completion of antibiotics for his diverticulitis.   -Colorectal planning elective surgical resection in 6-8 weeks if he does well with conservative treatment for now.       Patient and plan discussed with Dr. Del Toro.       JOHNSON Cheng-C    Interval History   Tolerating antibiotics ok   Denies pain around drain and minimal abdominal discomfort. Tolerated full liquid diet.   Labs reviewed         Physical Exam   Temp: 97.4  F (36.3  C) Temp src: Oral BP: 134/66 Pulse: 52   Resp: 16 SpO2: 96 % O2 Device: None (Room air)    Vitals:    02/18/24 1359   Weight: 117.9 kg (260 lb)     Vital Signs with Ranges  Temp:  [97.4  F (36.3  C)-98.2  F (36.8  C)] 97.4  F (36.3  C)  Pulse:  [52-56] 52  Resp:  [16] 16  BP: (129-148)/(65-76) 134/66  SpO2:  [95 %-97 %] 96 %    Constitutional: Sleeping, comfortable, no apparent distress  Lungs: Clear to auscultation bilaterally, no crackles or wheezing  Cardiovascular: Regular rate and rhythm, normal S1 and S2, and  no murmur noted  Abdomen: Normal bowel sounds, soft, non-distended, non-tender. Drain with cloudy, tan output.   Skin: No rashes, no cyanosis, no edema  Other:    Medications        lactobacillus rhamnosus (GG)  1 capsule Oral BID    nicotine   Transdermal Q8H    piperacillin-tazobactam  3.375 g Intravenous Q6H    sodium chloride (PF)  10 mL Irrigation Q8H    sodium chloride (PF)  3 mL Intracatheter Q8H    sodium chloride (PF)  3 mL Intracatheter Q8H    vancomycin  125 mg Oral BID       Data   All microbiology laboratory data reviewed.  Recent Labs   Lab Test 02/20/24  0710 02/19/24  0642 02/18/24  1411   WBC 10.8 13.1* 16.0*   HGB 11.8* 12.0* 14.0   HCT 34.7* 35.1* 41.4   MCV 87 87 87    475* 581*     Recent Labs   Lab Test 02/20/24  0710 02/19/24  0642 02/18/24  1411   CR 0.67 0.78 0.89     02/19/2024 1035 02/21/2024 1345 Anaerobic Bacterial Culture Routine [[id number]]   (Abnormal)   Abscess from Abdomen    Preliminary result Component Value   Culture Culture in progress P    4+ Parabacteroides distasonis Abnormal  P    Identification obtained by MALDI-TOF mass spectrometry research use only database. Test characteristics determined and verified by the Infectious Diseases Diagnostic Laboratory.  Susceptibilities not routinely done, refer to antibiogram to view typical susceptibility profiles    4+ Bacteroides thetaiotaomicron Abnormal  P    Susceptibilities not routinely done, refer to antibiogram to view typical susceptibility profiles             02/19/2024 1035 02/19/2024 1418 Gram Stain [[id number]]   (Abnormal)   Abscess from Abdomen    Final result Component Value   Gram Stain Result 4+ Gram positive cocci Abnormal    Gram Stain Result 3+ Gram positive bacilli Abnormal    Gram Stain Result 3+ Gram negative bacilli Abnormal    Gram Stain Result 4+ WBC seen Abnormal    Predominantly PMNs          02/19/2024 1035 02/21/2024 2241 Abscess Aerobic Bacterial Culture Routine [[id number]]    (Abnormal)    Abscess from Abdomen    Preliminary result Component Value   Culture Culture in progress P    4+ Proteus mirabilis Abnormal  P    2+ Escherichia coli Abnormal  P    3+ Streptococcus anginosus Abnormal  P    This organism is susceptible to ampicillin, penicillin, vancomycin and the cephalosporins. If treatment is required and your patient is allergic to penicillin, contact the microbiology lab within 5 days to request susceptibility testing.       Susceptibility     Proteus mirabilis Escherichia coli     PILI PILI     Ampicillin <=2 ug/mL Susceptible 4 ug/mL Susceptible     Ampicillin/ Sulbactam <=2 ug/mL Susceptible <=2 ug/mL Susceptible     Cefepime <=1 ug/mL Susceptible <=1 ug/mL Susceptible     Ceftazidime <=1 ug/mL Susceptible <=1 ug/mL Susceptible     Ceftriaxone <=1 ug/mL Susceptible <=1 ug/mL Susceptible     Ciprofloxacin <=0.25 ug/mL Susceptible <=0.25 ug/mL Susceptible     Gentamicin <=1 ug/mL Susceptible <=1 ug/mL Susceptible     Levofloxacin <=0.12 ug/mL Susceptible <=0.12 ug/mL Susceptible     Meropenem <=0.25 ug/mL Susceptible <=0.25 ug/mL Susceptible     Piperacillin/Tazobactam <=4 ug/mL Susceptible <=4 ug/mL Susceptible     Tobramycin <=1 ug/mL Susceptible <=1 ug/mL Susceptible     Trimethoprim/Sulfamethoxazole <=1/19 ug/mL Susceptible <=1/19 ug/mL Susceptible                    02/18/2024 1827 02/21/2024 2246 Blood Culture Peripheral Blood [[id number]]   Peripheral Blood    Preliminary result Component Value   Culture No growth after 3 days P             02/18/2024 1821 02/21/2024 2246 Blood Culture Peripheral Blood [[id number]]   Peripheral Blood    Preliminary result Component Value   Culture No growth after 3 days P             02/18/2024 1714 02/18/2024 2144 Enteric Bacteria and Virus Panel PCR [[id number]]    Stool from Per Rectum    Final result Component Value   Campylobacter species Negative   Salmonella species Negative   Vibrio species Negative   Vibrio cholerae Negative   Yersinia  enterocolitica Negative   Enteropathogenic E. coli (EPEC) Negative   Shiga-like toxin-producing E. coli (STEC) Negative   Shigella/Enteroinvasive E. coli (EIEC) Negative   Cryptosporidium species Negative   Giardia lamblia Negative   Norovirus Gl/Gll Negative   Rotavirus A Negative   Plesiomonas shigelloides Negative   Enteroaggregative E. coli (EAEC) Negative   Enterotoxigenic E. coli (ETEC) Negative   E. coli O157 NA   Cyclospora cayetanensis Negative   Entamoeba histolytica Negative   Adenovirus F40/41 Negative   Astrovirus Negative   Sapovirus Negative          02/18/2024 1714 02/18/2024 2104 C. difficile Toxin B PCR with reflex to C. difficile Antigen and Toxins A/B EIA [[id number]]    (Abnormal)   Stool from Per Rectum    Final result Component Value   C Difficile Toxin B by PCR Positive Abnormal    Detection of C. difficile nucleic acid in stools confirms the presence of these organisms in diarrheal patients but may not indicate that C. difficile is the etiologic agent of the diarrhea. Results from the Xpert C. difficile assay should be interpreted in conjunction with other laboratory and clinical data available to the clinician.    Patients with a positive C. difficile PCR result will receive reflex GDH/Toxin Immunoassay testing. Please interpret the PCR test result in conjunction with GDH/Toxin Immunoassay results and the clinical status of patient.          02/18/2024 1714 02/18/2024 2146 C. difficile Antigen and Toxins A/B by Enzyme Immunoassay [[id number]]    Stool from Per Rectum    Final result Component Value   C. difficile GDH Antigen Negative   C. difficile Toxin Negative          02/18/2024 1625 02/19/2024 1437 Urine Culture [[id number]]   Urine, Midstream    Final result Component Value   Culture 50,000-100,000 CFU/mL Mixture of Urogenital Irene           EXAM: CT ABDOMEN PELVIS W CONTRAST  LOCATION: [facility]  DATE: 2/18/2024    INDICATION: check for  Diverticulitis, abscess, c diff colitis  LLQ pain and diarrhea  COMPARISON: None.  TECHNIQUE: CT scan of the abdomen and pelvis was performed following injection of IV contrast. Multiplanar reformats were obtained. Dose reduction techniques were used.  CONTRAST: 131mL Isovue 370    FINDINGS:  LOWER CHEST: Partially imaged coronary artery calcifications.    HEPATOBILIARY: Normal.    PANCREAS: Normal.    SPLEEN: Normal.    ADRENAL GLANDS: Normal.    KIDNEYS/BLADDER: No significant mass, obstructing stone, or hydronephrosis. Focal wall thickening of the anterolateral superior bladder wall adjacent to the pelvic fluid collection. No intraluminal gas.    BOWEL: Left-sided diverticulosis with extensive wall thickening and pericolonic fat stranding in the proximal and mid sigmoid colon. There is extensive surrounding extraluminal gas, as well as a multiloculated gas-containing fluid collection anterior and   inferior to the affected segment of sigmoid colon, which measures 8.8 x 5.6 x 4.7 cm (3/181, 6/68). A few loops of small bowel are in close proximity with mild reactive wall thickening. No small bowel dilation.    LYMPH NODES: Normal.    VASCULATURE: Unremarkable.    PELVIC ORGANS: Normal.    MUSCULOSKELETAL: Degenerative changes in the spine.   Impression:     IMPRESSION:  1.  Acute sigmoid diverticulitis complicated by perforation with an adjacent gas containing fluid collection, most likely abscess.    2.  Localized wall thickening of the urinary bladder, likely secondary to the adjacent abscess. No intraluminal gas to suggest colovesical fistula.

## 2024-02-22 NOTE — PLAN OF CARE
Summary: Sigmoid diverticulitis complicated by perforation and likely abscess.   Date & Time: 2/20/24 NOC  Surgery/POD#: 3  Behavior & Aggression: IR drainage of abscess diverticulitis   Fall Risk: no  Orientation:A&Ox4  ABNL VS/O2:  ABNL Labs: K wnl   Pain Management:Denied  Bowel/Bladder: Continent B&B, up to use BR x2 no bm   Drains: MARIA ISABEL drain L abd flushed per orders   Diet: full liquid poor appetite   Activity Level: Ind  Tests/Procedures: none  Anticipated  DC Date: Pending  Significant Information: vanco for C-diff prophylaxis

## 2024-02-22 NOTE — PROGRESS NOTES
nterventional Radiology Progress Note:  Inpatient at [facility]  Date: February 20, 2024      History: Castillo Alba is a 68 year old male with history of C. difficile infection, prior diverticulitis presented to emergency department complaining of abdominal pain and diarrhea.  CT scan of the abdomen pelvis shows acute sigmoid diverticulitis complicated by perforation and likely abscess       He is s/p LLQ drain placement 2/19/24 with good outputs since. Kuldeep is being seen in IR follow up today.      Interval History: No pain really, eating better, I want to clean up and get out of here     Physical Exam:   Temp:  [97.4  F (36.3  C)-97.6  F (36.4  C)] 97.6  F (36.4  C)  Pulse:  [52-76] 76  Resp:  [16-18] 18  BP: (129-148)/(66-86) 139/86  SpO2:  [95 %-97 %] 97 %    General: Pleasant male, lying in bed, in no acute distress.    Neuro:  A&O x 4. Moves all extremities equally.  Resp:  Normal respirations on room air. Non labored breathing. Equal air entry B/L   Abdomen:  Soft, obese,      LLQ Drain:   -Dressing has some dried old blood on it, D/I.    -Tube is draining serosanguinous to brown fluid.      Outputs     2/19 115 mL; 2/20 80 mL; 2/21 25 mL; 2/22 20 mL so far today     Gram stain:   4+ Proteus mirabilis Abnormal    Identification is preliminary, confirmation in progress   2+ Escherichia coli Abnormal    Identification is preliminary, confirmation in progress   3+ Streptococcus anginosus Abnormal       Labs:  ROUTINE ICU LABS (Last four results)  CMP  Recent Labs   Lab 02/22/24  1054 02/21/24  0656 02/20/24  1350 02/20/24  0710 02/19/24  1716 02/19/24  0642 02/18/24  1411   NA  --   --   --  139  --  137 131*   POTASSIUM 3.5 3.7 3.6 3.3*   < > 3.3* 3.5   CHLORIDE  --   --   --  103  --  101 92*   CO2  --   --   --  24  --  24 27   ANIONGAP  --   --   --  12  --  12 12   GLC  --   --   --  111*  --  140* 175*   BUN  --   --   --  9.6  --  10.0 10.2   CR  --   --   --  0.67  --  0.78 0.89    GFRESTIMATED  --   --   --  >90  --  >90 >90   LENORE  --   --   --  8.2*  --  8.2* 8.8   PROTTOTAL  --   --   --   --   --   --  7.2   ALBUMIN  --   --   --   --   --   --  3.1*   BILITOTAL  --   --   --   --   --   --  0.6   ALKPHOS  --   --   --   --   --   --  146   AST  --   --   --   --   --   --  29   ALT  --   --   --   --   --   --  30    < > = values in this interval not displayed.     CBC  Recent Labs   Lab 02/20/24  0710 02/19/24  0642 02/18/24  1411   WBC 10.8 13.1* 16.0*   RBC 3.97* 4.03* 4.76   HGB 11.8* 12.0* 14.0   HCT 34.7* 35.1* 41.4   MCV 87 87 87   MCH 29.7 29.8 29.4   MCHC 34.0 34.2 33.8   RDW 13.1 13.0 12.7    475* 581*     Assessment: Improved since drainage and antibiotics, tired, WBC normalizing, no recent CBC     Plan: Continue with drainage, monitoring outputs, IR will follow     Total time spent on the date of the encounter is 30 minutes, including time spent counseling the patient, performing a medically appropriate evaluation, reviewing prior medical history, ordering medications and tests, documenting clinical information in the medical record, and communication of results.     Thanks [facility] Interventional Radiology CNP ([phone]) (phone [phone])

## 2024-02-22 NOTE — PLAN OF CARE
Goal Outcome Evaluation:  PT: Discussed with nurse. She reports patient is up in the room independently and doesn't feel patient has any PT needs. Will complete PT order.

## 2024-02-23 ENCOUNTER — APPOINTMENT (OUTPATIENT)
Dept: CT IMAGING | Facility: CLINIC | Age: 69
DRG: 391 | End: 2024-02-23
Attending: INTERNAL MEDICINE
Payer: COMMERCIAL

## 2024-02-23 LAB
ANION GAP SERPL CALCULATED.3IONS-SCNC: 11 MMOL/L (ref 7–15)
BACTERIA BLD CULT: NO GROWTH
BACTERIA BLD CULT: NO GROWTH
BUN SERPL-MCNC: 5.7 MG/DL (ref 8–23)
CALCIUM SERPL-MCNC: 8.6 MG/DL (ref 8.8–10.2)
CHLORIDE SERPL-SCNC: 99 MMOL/L (ref 98–107)
CREAT SERPL-MCNC: 0.67 MG/DL (ref 0.67–1.17)
DEPRECATED HCO3 PLAS-SCNC: 28 MMOL/L (ref 22–29)
EGFRCR SERPLBLD CKD-EPI 2021: >90 ML/MIN/1.73M2
ERYTHROCYTE [DISTWIDTH] IN BLOOD BY AUTOMATED COUNT: 12.9 % (ref 10–15)
GLUCOSE SERPL-MCNC: 124 MG/DL (ref 70–99)
HCT VFR BLD AUTO: 38.3 % (ref 40–53)
HGB BLD-MCNC: 12.7 G/DL (ref 13.3–17.7)
MCH RBC QN AUTO: 28.9 PG (ref 26.5–33)
MCHC RBC AUTO-ENTMCNC: 33.2 G/DL (ref 31.5–36.5)
MCV RBC AUTO: 87 FL (ref 78–100)
PLATELET # BLD AUTO: 490 10E3/UL (ref 150–450)
POTASSIUM SERPL-SCNC: 3.4 MMOL/L (ref 3.4–5.3)
RBC # BLD AUTO: 4.4 10E6/UL (ref 4.4–5.9)
SODIUM SERPL-SCNC: 138 MMOL/L (ref 135–145)
WBC # BLD AUTO: 6.1 10E3/UL (ref 4–11)

## 2024-02-23 PROCEDURE — 250N000013 HC RX MED GY IP 250 OP 250 PS 637: Performed by: INTERNAL MEDICINE

## 2024-02-23 PROCEDURE — 80048 BASIC METABOLIC PNL TOTAL CA: CPT | Performed by: INTERNAL MEDICINE

## 2024-02-23 PROCEDURE — 250N000011 HC RX IP 250 OP 636: Performed by: PHYSICIAN ASSISTANT

## 2024-02-23 PROCEDURE — 85014 HEMATOCRIT: CPT | Performed by: INTERNAL MEDICINE

## 2024-02-23 PROCEDURE — 74174 CTA ABD&PLVS W/CONTRAST: CPT

## 2024-02-23 PROCEDURE — 99232 SBSQ HOSP IP/OBS MODERATE 35: CPT | Performed by: INTERNAL MEDICINE

## 2024-02-23 PROCEDURE — 120N000001 HC R&B MED SURG/OB

## 2024-02-23 PROCEDURE — 36415 COLL VENOUS BLD VENIPUNCTURE: CPT | Performed by: INTERNAL MEDICINE

## 2024-02-23 PROCEDURE — 250N000011 HC RX IP 250 OP 636: Performed by: INTERNAL MEDICINE

## 2024-02-23 PROCEDURE — 250N000009 HC RX 250: Performed by: INTERNAL MEDICINE

## 2024-02-23 PROCEDURE — 99232 SBSQ HOSP IP/OBS MODERATE 35: CPT | Performed by: PHYSICIAN ASSISTANT

## 2024-02-23 RX ORDER — IOPAMIDOL 755 MG/ML
72 INJECTION, SOLUTION INTRAVASCULAR ONCE
Status: COMPLETED | OUTPATIENT
Start: 2024-02-23 | End: 2024-02-23

## 2024-02-23 RX ADMIN — AMPICILLIN SODIUM AND SULBACTAM SODIUM 3 G: 2; 1 INJECTION, POWDER, FOR SOLUTION INTRAMUSCULAR; INTRAVENOUS at 18:21

## 2024-02-23 RX ADMIN — Medication 1 CAPSULE: at 21:45

## 2024-02-23 RX ADMIN — SODIUM CHLORIDE 80 ML: 9 INJECTION, SOLUTION INTRAVENOUS at 06:09

## 2024-02-23 RX ADMIN — AMPICILLIN SODIUM AND SULBACTAM SODIUM 3 G: 2; 1 INJECTION, POWDER, FOR SOLUTION INTRAMUSCULAR; INTRAVENOUS at 11:57

## 2024-02-23 RX ADMIN — Medication 1 CAPSULE: at 08:26

## 2024-02-23 RX ADMIN — IOPAMIDOL 72 ML: 755 INJECTION, SOLUTION INTRAVENOUS at 06:09

## 2024-02-23 RX ADMIN — ACETAMINOPHEN 650 MG: 325 TABLET, FILM COATED ORAL at 18:20

## 2024-02-23 RX ADMIN — AMPICILLIN SODIUM AND SULBACTAM SODIUM 3 G: 2; 1 INJECTION, POWDER, FOR SOLUTION INTRAMUSCULAR; INTRAVENOUS at 06:19

## 2024-02-23 RX ADMIN — AMPICILLIN SODIUM AND SULBACTAM SODIUM 3 G: 2; 1 INJECTION, POWDER, FOR SOLUTION INTRAMUSCULAR; INTRAVENOUS at 01:05

## 2024-02-23 RX ADMIN — VANCOMYCIN HYDROCHLORIDE 125 MG: 125 CAPSULE ORAL at 08:26

## 2024-02-23 RX ADMIN — VANCOMYCIN HYDROCHLORIDE 125 MG: 125 CAPSULE ORAL at 21:45

## 2024-02-23 ASSESSMENT — ACTIVITIES OF DAILY LIVING (ADL)
ADLS_ACUITY_SCORE: 24

## 2024-02-23 NOTE — PROGRESS NOTES
Interventional Radiology Progress Note:  Inpatient at [facility]  Date: February 20, 2024      History: Castillo Alba is a 68 year old male with history of C. difficile infection, prior diverticulitis presented to emergency department complaining of abdominal pain and diarrhea.  CT scan of the abdomen pelvis shows acute sigmoid diverticulitis complicated by perforation and likely abscess       He is s/p LLQ drain placement 2/19/24 with good outputs since. Kuldeep is being seen in IR follow up today.      Interval History: Feels discomfort at the drain site, no pain really.      Physical Exam:   Temp:  [97.4  F (36.3  C)-98.3  F (36.8  C)] 97.6  F (36.4  C)  Pulse:  [54-84] 84  Resp:  [16-18] 16  BP: (121-144)/(50-86) 137/68  SpO2:  [96 %-98 %] 97 %    General: Pleasant male, lying in bed, in no acute distress.    Neuro:  A&O x 4. Moves all extremities equally.  Resp:  Normal respirations on room air. Non labored breathing. Equal air entry B/L   Abdomen:  Soft, obese,      LLQ Drain:   -Dressing is CD&I, No erythema noted in the area that was circled today  -Drain flushed easily with 10 mL NS and aspirated what was flushed in without pain or leaking   -Tube is draining serosanguinous to serous fluid.      Outputs     2/19 115 mL; 2/20 80 mL; 2/21 25 mL; 2/22 30 mL; 2/23 5 mL so far today     Gram stain:   4+ Proteus mirabilis Abnormal    Identification is preliminary, confirmation in progress   2+ Escherichia coli Abnormal    Identification is preliminary, confirmation in progress   3+ Streptococcus anginosus Abnormal       Labs:  ROUTINE ICU LABS (Last four results)  ROUTINE ICU LABS (Last four results)  CMP  Recent Labs   Lab 02/23/24  0503 02/22/24  1054 02/21/24  0656 02/20/24  1350 02/20/24  0710 02/19/24  1716 02/19/24  0642 02/18/24  1411     --   --   --  139  --  137 131*   POTASSIUM 3.4 3.5 3.7 3.6 3.3*   < > 3.3* 3.5   CHLORIDE 99  --   --   --  103  --  101 92*   CO2 28  --   --    --  24  --  24 27   ANIONGAP 11  --   --   --  12  --  12 12   *  --   --   --  111*  --  140* 175*   BUN 5.7*  --   --   --  9.6  --  10.0 10.2   CR 0.67  --   --   --  0.67  --  0.78 0.89   GFRESTIMATED >90  --   --   --  >90  --  >90 >90   LENORE 8.6*  --   --   --  8.2*  --  8.2* 8.8   PROTTOTAL  --   --   --   --   --   --   --  7.2   ALBUMIN  --   --   --   --   --   --   --  3.1*   BILITOTAL  --   --   --   --   --   --   --  0.6   ALKPHOS  --   --   --   --   --   --   --  146   AST  --   --   --   --   --   --   --  29   ALT  --   --   --   --   --   --   --  30    < > = values in this interval not displayed.     CBC  Recent Labs   Lab 02/23/24  0503 02/20/24  0710 02/19/24  0642 02/18/24  1411   WBC 6.1 10.8 13.1* 16.0*   RBC 4.40 3.97* 4.03* 4.76   HGB 12.7* 11.8* 12.0* 14.0   HCT 38.3* 34.7* 35.1* 41.4   MCV 87 87 87 87   MCH 28.9 29.7 29.8 29.4   MCHC 33.2 34.0 34.2 33.8   RDW 12.9 13.1 13.0 12.7   * 425 475* 581*     Imaging  EXAM: CT ABDOMEN AND PELVIS WITH CONTRAST  LOCATION: [facility]  DATE/TIME: 02/23/2024 6:08 AM CST     INDICATION: Abdominal abscess status post percutaneous drain.  COMPARISON: 02/18/2024.    IMPRESSION:   1.  Mild wall thickening of the proximal and mid sigmoid colon, in the region of diverticula, similar in appearance to the comparison study. This likely relates to changes associated with diverticulitis. There are a few curvilinear opacities in the   mesenteric fat adjacent to the thick-walled colon again noted that could represent sinus tracts  2.  Interval placement of a percutaneous drainage catheter into what was a complex fluid collection in the anterior pelvis on the comparison study dated 02/18/2024. No residual fluid is present about the catheter.   3.  Skin thickening and haziness within the underlying subcutaneous fat of the anterior left pelvic wall, about the insertion site of the aforementioned catheter. This is nonspecific, but  could represent cellulitis. Recommend clinical correlation. No   abscess in the anterior abdominal wall.    Assessment: Improved since drainage and antibiotics, tired, WBC normalizing,   -No fluid left around the drain, inflammation at the skin site, possible cellulitis though the site appears improved, drain outputs decreasing. He will need a CT sinogram in the future to look for a connection to the bowel.      Plan: Continue with drainage, monitoring outputs, IR will follow  -NS flushes discontinued  -Recommend CT sinogram at some point     Total time spent on the date of the encounter is 30 minutes, including time spent counseling the patient, performing a medically appropriate evaluation, reviewing prior medical history, ordering medications and tests, documenting clinical information in the medical record, and communication of results.     Thanks [facility] Interventional Radiology CNP ([phone]) (phone [phone])

## 2024-02-23 NOTE — PROGRESS NOTES
Pt is Aox4, up independently in room, low fiber diet. Denies pain, gillian drain in place. Discharge pending.

## 2024-02-23 NOTE — PLAN OF CARE
Summary: Sigmoid diverticulitis complicated by perforation and likely abscess.   Date & Time: 2/22/24 NOC  Surgery/POD#: 4  Behavior & Aggression: IR drainage of abscess diverticulitis   Fall Risk: no  Orientation:A&Ox4  ABNL VS/O2:  ABNL Labs: K wnl   Pain Management:Denied  Bowel/Bladder: Continent B&B, using BR ind  Drains: MARIA ISABEL drain L abd flushed per orders   Diet: Low fiber  Activity Level: Ind  Tests/Procedures: CT abd done  Anticipated  DC Date: Pending  Significant Information: vanco for C-diff prophylaxis.

## 2024-02-23 NOTE — PLAN OF CARE
Goal Outcome Evaluation:    Assumed care for 4h. Pt here with diverticulitis. On IV and PO abx. Ind in room. A&Ox4. Explained plan of care to son, wife and patient. Denies SOB, abd pain. LLQ drain WDL, serosanguinous. Denies pain, calls appropriately. Discharge sometime this weekend most likely.

## 2024-02-23 NOTE — PROGRESS NOTES
COLON & RECTAL SURGERY  PROGRESS NOTE    February 23, 2024    SUBJECTIVE:  Feeling fine. Denies pain. Tolerating LFD. CT repeated this morning. Drain put out 15cc yesterday. AVSS. WBC down to 6.     OBJECTIVE:  Temp:  [97.4  F (36.3  C)-98.3  F (36.8  C)] 97.6  F (36.4  C)  Pulse:  [54-84] 84  Resp:  [16] 16  BP: (121-144)/(50-78) 137/68  SpO2:  [96 %-98 %] 97 %    Intake/Output Summary (Last 24 hours) at 2/23/2024 1255  Last data filed at 2/23/2024 0515  Gross per 24 hour   Intake 400 ml   Output 5 ml   Net 395 ml       GENERAL:  Awake, alert, no acute distress  HEAD: Normocephalic atraumatic  SCLERA: Anicteric  EXTREMITIES: Warm and well perfused  ABDOMEN:  Soft, minimally tender in LLQ, non-distended. No guarding, rigidity, or peritoneal signs. Drain with cloudy serosang drainage. Cellulitis seems improved.     LABS:  Lab Results   Component Value Date    WBC 6.1 02/23/2024    WBC 6.6 10/17/2019     Lab Results   Component Value Date    HGB 12.7 02/23/2024    HGB 17.2 10/17/2019     Lab Results   Component Value Date    HCT 38.3 02/23/2024    HCT 49.0 10/17/2019     Lab Results   Component Value Date     02/23/2024     10/17/2019     Last Basic Metabolic Panel:  Lab Results   Component Value Date     02/23/2024     10/17/2019      Lab Results   Component Value Date    POTASSIUM 3.4 02/23/2024    POTASSIUM 3.8 10/17/2019     Lab Results   Component Value Date    CHLORIDE 99 02/23/2024    CHLORIDE 106 10/17/2019     Lab Results   Component Value Date    LENORE 8.6 02/23/2024    LENORE 9.3 10/17/2019     Lab Results   Component Value Date    CO2 28 02/23/2024    CO2 26 10/17/2019     Lab Results   Component Value Date    BUN 5.7 02/23/2024    BUN 17 10/17/2019     Lab Results   Component Value Date    CR 0.67 02/23/2024    CR 0.84 10/17/2019     Lab Results   Component Value Date     02/23/2024     10/17/2019       ASSESSMENT/PLAN: 68yoM with h/o C. difficile infection treated in  December 2023 and 5-6 lifetime episodes of uncomplicated diverticulitis over the last 15 to 20 years who presents with acute sigmoid diverticulitis with an adjacent large 8.8 cm pericolonic abscess. Labs are consistent with C diff colonization without active toxin production. Now s/p IR drain placement on 2/19. Drain outputs decreasing.  CT repeated this morning which showed no residual fluid in the abscess cavity, some ongoing inflammation and possible sinus tracts surrounding;, and abdominal wall cellulitis without abdominal wall abscess.    - Low fiber diet  - Abx plan per ID  - Continue drain. With findings on CT today would keep drain in at discharge and plan for CT sinogram in roughly 1 week to reevaluate. May need to keep drain in until surgery in 6-8 weeks.  - Ok to discharge once Abx plan in place from our standpoint.    Will discuss with Dr. Cruz.    For questions/paging, please contact the CRS office at [phone].    Minesh Doherty PA-C  Colorectal Physician Assistant    [facility]  [address] [address]  T: [phone]  F: [phone]

## 2024-02-23 NOTE — PROGRESS NOTES
[facility]    Medicine Progress Note - Hospitalist Service    Date of Admission:  2/18/2024    Assessment & Plan     Assessment & Plan  Castillo Alba is a 68 year old male with history of C. difficile infection, prior diverticulitis presented to emergency department complaining of abdominal pain and diarrhea.  CT scan of the abdomen pelvis shows acute sigmoid diverticulitis complicated by perforation and likely abscess.     Principal Problem:    Diverticulitis of large intestine with perforation and abscess S/p IR guided drain placement on 2/19 per Colorectal surgery     History of C. difficile colitis consistent with colonization   Admitted  as inpatient  Zosyn IV   Per  Drain, CT images show that the fluid collection is amenable to percutaneous abscess.  She has a call out to IR.  IR consult requested  Colorectal surgery consult requested  S/p IR drain placement. Fluid culture results with proteus, e.coli, strep anginosus   On IV Zosyn switched to IV IV Unasyn per ID  On full liquid diet advanced to low fiber diet on 2/22/2024  Most likely sinogram in 1 week as per colorectal surgery  Blood cultures remain negative  ID consulted and are following   IVF stopped on 2/21/24   CT abdomen pelvis with contrast repeated on 2/23/2024 morning showed the abdominal intra-abdominal abscess mostly resolved.  Small areas of sinus tracts seen.  Mild inflammation in the anterior abdominal wall with possible cellulitis seen    NT abdominal cellulitis much improved compared to yesterday    H/o recurrent c.diff with chronic c.diff colonization     Patient has a history of antibiotic associated diarrhea; C. difficile toxin was positive on 12/18/2023, he was treated with metronidazole.  on admission Discussed with Pharm.D.  Begin oral vancomycin for prophylaxis while on broad-spectrum antibiotics   C. difficile PCR is positive but GDH antigen and C. difficile toxin tests are negative. This should mean  that patient does not have active C. difficile infection. Continue vancomycin 125 mg p.o. twice daily for C. difficile prophylaxis while on broad-spectrum antibiotics for diverticulitis with abscess.         Active Problems:    Hyponatremia resolved with IVF   Hypokalemia  Likely related to volume depletion from diarrhea  Treat with normal saline  Recheck BMP  -Replacement protocol started       Leukocytosis    Thrombocythemia  Follow-up blood counts  Wbc count improving         Pyuria  Urine culture with a  K mixture of urogenital nuha  As above, continue Zosyn        Tobacco use disorder  Nicotine replacement as needed           Diet: Clear liquid diet per colorectal surgery   DVT Prophylaxis: Pneumatic Compression Devices  Zamora Catheter: Not present  Lines: None     Cardiac Monitoring: None  Code Status:  Full        Clinically Significant Risk Factors Present on Admission               # Hypoalbuminemia: Lowest albumin = 3.1 g/dL at 2/18/2024  2:11 PM, will monitor as appropriate          # Obesity: Estimated body mass index is 35.26 kg/m  as calculated from the following:    Height as of this encounter: 1.829 m (6').    Weight as of this encounter: 117.9 kg (260 lb).                                 Clinically Significant Risk Factors              # Hypoalbuminemia: Lowest albumin = 3.1 g/dL at 2/18/2024  2:11 PM, will monitor as appropriate            # Obesity: Estimated body mass index is 35.26 kg/m  as calculated from the following:    Height as of this encounter: 1.829 m (6').    Weight as of this encounter: 117.9 kg (260 lb).             Disposition Plan      Expected Discharge Date: 02/26/2024        Discharge Comments: Pending abx plan per ID     In the 4-5days when OK with Colorectal and IR      Wife updated on 2/20/24   Discussed with bedside RN and Pt on 2/21/24     Janis Anderson MD  Hospitalist Service  [facility]  Securely message with [employer] (more info)  Text page  via [facility] Paging/Directory   ______________________________________________________________________    Interval History   Pt resting in bed.  Abdominal pain improving after IR drain placement.  No n/v, or diarroea.  No other acute issues    Physical Exam   Vital Signs: Temp: 97.6  F (36.4  C) Temp src: Oral BP: 137/68 Pulse: 84   Resp: 16 SpO2: 97 % O2 Device: None (Room air)    Weight: 260 lbs 0 oz    General Appearance: Alert, awake, NAD   Respiratory: CTA b/l   Cardiovascular: RRR  GI: soft, moderately distended, tender in lower abdomen, BS+, abdominal wall cellulitis much improved around the drain site  Skin: warm and dry   Other:      Medical Decision Making       49 MINUTES SPENT BY ME on the date of service doing chart review, history, exam, documentation & further activities per the note.      Data     I have personally reviewed the following data over the past 24 hrs:    6.1  \   12.7 (L)   / 490 (H)     138 99 5.7 (L) /  124 (H)   3.4 28 0.67 \       Imaging results reviewed over the past 24 hrs:   Recent Results (from the past 24 hour(s))   CTV Abdomen Pelvis w Contrast    Narrative    EXAM: CT ABDOMEN AND PELVIS WITH CONTRAST  LOCATION: [facility]  DATE/TIME: 02/23/2024 6:08 AM CST    INDICATION: Abdominal abscess status post percutaneous drain.  COMPARISON: 02/18/2024.    TECHNIQUE: CT scan of the abdomen and pelvis was performed following injection of IV contrast. Delayed phase images were also acquired. Multiplanar reformats were obtained. Dose reduction techniques were used.  CONTRAST: 72 mL Isovue 370.    FINDINGS:    LOWER CHEST: Unremarkable.    HEPATOBILIARY: The gallbladder is moderately distended, but otherwise unremarkable.    SPLEEN: Unremarkable.    PANCREAS: Unremarkable.    ADRENAL GLANDS: Unremarkable.    KIDNEYS/BLADDER: No suspicious renal lesions.    BOWEL: The stomach, small and large bowel are normal in caliber. Several diverticula are present in the colon.  Mild wall thickening of the proximal and mid sigmoid colon. Mild haziness is present within the fat about the thick-walled colon. Interval   placement of a percutaneous pigtail drainage catheter via the left anterior pelvic wall into what was a complex fluid collection in the anterior left hemipelvis on the comparison study dated 02/18/2024. No residual fluid is visualized about the distal   portion of the catheter. Skin thickening and haziness within the underlying subcutaneous fat of the anterior left pelvic wall, about the insertion site of the catheter. No fluid collection in the anterior abdominal wall. A few curvilinear opacities   within the mesenteric fat adjacent to the mid sigmoid colon and near the drainage catheter could possibly represent sinus tracts. Normal appendix.    LYMPH NODES: Unremarkable.    PELVIC ORGANS: Mild enlargement of the prostate gland.    MUSCULOSKELETAL: No acute abnormality.    OTHER: Small bilateral inguinal hernias containing fat. Atherosclerotic calcification in the abdominal aorta.      Impression    IMPRESSION:   1.  Mild wall thickening of the proximal and mid sigmoid colon, in the region of diverticula, similar in appearance to the comparison study. This likely relates to changes associated with diverticulitis. There are a few curvilinear opacities in the   mesenteric fat adjacent to the thick-walled colon again noted that could represent sinus tracts  2.  Interval placement of a percutaneous drainage catheter into what was a complex fluid collection in the anterior pelvis on the comparison study dated 02/18/2024. No residual fluid is present about the catheter.   3.  Skin thickening and haziness within the underlying subcutaneous fat of the anterior left pelvic wall, about the insertion site of the aforementioned catheter. This is nonspecific, but could represent cellulitis. Recommend clinical correlation. No   abscess in the anterior abdominal wall.

## 2024-02-23 NOTE — PROGRESS NOTES
[facility]    Infectious Disease Progress Note    Date of Service (when I saw the patient): 02/23/2024     Assessment & Plan   Castillo Alba is a 68 year old who was admitted on 2/18/2024.     Impression:  69 yo male with prior history of c.diff in 12/2023 admitted now with abdominal pain and diarrhea and noted to have perforated diverticulitis.     -S/p drain placement 2/19/24. Cultures growing Strep anginosus, pan-sensitive E.coli, pan-sensitive proteus mirabilis, bacteroides, and parabacteroides  -On Unasyn.   -Afebrile. Leukocytosis resolved.   -Mild cellulitis about abdominal wall where drain is.   -Repeat CT abdomen with resolution of fluid collection around drain. Also noted are changes of diverticulitis and potentially some sinus tracts as well as possible cellulitis abdominal wall near drain.  -Recent history of c.diff colitis in 12/2023. Started on prophylactic dosing oral vancomycin with c.diff studies this admission showing colonization.         Recommendations:   -Continue Unasyn while in hospital. Upon discharge, change to Augmentin for 2 more weeks.   -Drain management per IR and colorectal surgery. Drain to remain in for now.   -Continue prophylactic oral Vancomycin dosing given recent history of C.diff colitis. He should continue this for 5 days after completion of antibiotics for his diverticulitis.   -Colorectal planning elective surgical resection in 6-8 weeks if he does well with conservative treatment for now.   -Close outpatient follow-up with surgery. If there are any questions or concerns on surgical follow-up in a week, please don't hesitate to contact infectious Disease.  -ID will sign off. Please call with any questions.     Patient and plan discussed with Dr. Del Toro.       Collette Mast PA-C      Interval History   Tolerating antibiotics ok   Denies pain around drain and minimal abdominal discomfort. Tolerated full liquid diet.   Labs reviewed          Physical Exam   Temp: 97.6  F (36.4  C) Temp src: Oral BP: 137/68 Pulse: 84   Resp: 16 SpO2: 97 % O2 Device: None (Room air)    Vitals:    02/18/24 1359   Weight: 117.9 kg (260 lb)     Vital Signs with Ranges  Temp:  [97.4  F (36.3  C)-98.3  F (36.8  C)] 97.6  F (36.4  C)  Pulse:  [54-84] 84  Resp:  [16-18] 16  BP: (121-144)/(50-86) 137/68  SpO2:  [96 %-98 %] 97 %    Constitutional: Sleeping, comfortable, no apparent distress  Lungs: Clear to auscultation bilaterally, no crackles or wheezing  Cardiovascular: Regular rate and rhythm, normal S1 and S2, and no murmur noted  Abdomen: Normal bowel sounds, soft, non-distended, non-tender. Drain with brownish serous output.   Skin: Some cellulitis around drain site on abdomen, mildly tender.   Other:    Medications        ampicillin-sulbactam  3 g Intravenous Q6H    lactobacillus rhamnosus (GG)  1 capsule Oral BID    nicotine   Transdermal Q8H    sodium chloride (PF)  10 mL Irrigation Q8H    sodium chloride (PF)  3 mL Intracatheter Q8H    vancomycin  125 mg Oral BID       Data   All microbiology laboratory data reviewed.  Recent Labs   Lab Test 02/23/24  0503 02/20/24  0710 02/19/24  0642   WBC 6.1 10.8 13.1*   HGB 12.7* 11.8* 12.0*   HCT 38.3* 34.7* 35.1*   MCV 87 87 87   * 425 475*     Recent Labs   Lab Test 02/23/24  0503 02/20/24  0710 02/19/24  0642   CR 0.67 0.67 0.78       Collected Updated Procedure Result Status    02/19/2024 1035 02/22/2024 1041 Anaerobic Bacterial Culture Routine [[id number]]   (Abnormal)   Abscess from Abdomen    Final result Component Value   Culture 4+ Parabacteroides distasonis Abnormal     Identification obtained by MALDI-TOF mass spectrometry research use only database. Test characteristics determined and verified by the Infectious Diseases Diagnostic Laboratory.  Susceptibilities not routinely done, refer to antibiogram to view typical susceptibility profiles    4+ Bacteroides thetaiotaomicron Abnormal      Susceptibilities not routinely done, refer to antibiogram to view typical susceptibility profiles    4+ Mixed Anaerobic Organisms Present             02/19/2024 1035 02/19/2024 1418 Gram Stain [[id number]]   (Abnormal)   Abscess from Abdomen    Final result Component Value   Gram Stain Result 4+ Gram positive cocci Abnormal    Gram Stain Result 3+ Gram positive bacilli Abnormal    Gram Stain Result 3+ Gram negative bacilli Abnormal    Gram Stain Result 4+ WBC seen Abnormal    Predominantly PMNs          02/19/2024 1035 02/22/2024 1037 Abscess Aerobic Bacterial Culture Routine [[id number]]    (Abnormal)   Abscess from Abdomen    Final result Component Value   Culture 4+ Proteus mirabilis Abnormal     2+ Escherichia coli Abnormal     3+ Streptococcus anginosus Abnormal     This organism is susceptible to ampicillin, penicillin, vancomycin and the cephalosporins. If treatment is required and your patient is allergic to penicillin, contact the microbiology lab within 5 days to request susceptibility testing.    3+ [place] (Actinomyces) odontolytica Abnormal     Susceptibilities not routinely done, refer to antibiogram to view typical susceptibility profiles       Susceptibility     Proteus mirabilis Escherichia coli     PILI PILI     Ampicillin <=2 ug/mL Susceptible 4 ug/mL Susceptible     Ampicillin/ Sulbactam <=2 ug/mL Susceptible <=2 ug/mL Susceptible     Cefepime <=1 ug/mL Susceptible <=1 ug/mL Susceptible     Ceftazidime <=1 ug/mL Susceptible <=1 ug/mL Susceptible     Ceftriaxone <=1 ug/mL Susceptible <=1 ug/mL Susceptible     Ciprofloxacin <=0.25 ug/mL Susceptible <=0.25 ug/mL Susceptible     Gentamicin <=1 ug/mL Susceptible <=1 ug/mL Susceptible     Levofloxacin <=0.12 ug/mL Susceptible <=0.12 ug/mL Susceptible     Meropenem <=0.25 ug/mL Susceptible <=0.25 ug/mL Susceptible     Piperacillin/Tazobactam <=4 ug/mL Susceptible <=4 ug/mL Susceptible     Tobramycin <=1 ug/mL Susceptible <=1 ug/mL Susceptible      Trimethoprim/Sulfamethoxazole <=1/19 ug/mL Susceptible <=1/19 ug/mL Susceptible                        02/18/2024 1827 02/21/2024 2246 Blood Culture Peripheral Blood [[id number]]   Peripheral Blood    Preliminary result Component Value   Culture No growth after 3 days P             02/18/2024 1821 02/21/2024 2246 Blood Culture Peripheral Blood [[id number]]   Peripheral Blood    Preliminary result Component Value   Culture No growth after 3 days P             02/18/2024 1714 02/18/2024 2144 Enteric Bacteria and Virus Panel PCR [[id number]]    Stool from Per Rectum    Final result Component Value   Campylobacter species Negative   Salmonella species Negative   Vibrio species Negative   Vibrio cholerae Negative   Yersinia enterocolitica Negative   Enteropathogenic E. coli (EPEC) Negative   Shiga-like toxin-producing E. coli (STEC) Negative   Shigella/Enteroinvasive E. coli (EIEC) Negative   Cryptosporidium species Negative   Giardia lamblia Negative   Norovirus Gl/Gll Negative   Rotavirus A Negative   Plesiomonas shigelloides Negative   Enteroaggregative E. coli (EAEC) Negative   Enterotoxigenic E. coli (ETEC) Negative   E. coli O157 NA   Cyclospora cayetanensis Negative   Entamoeba histolytica Negative   Adenovirus F40/41 Negative   Astrovirus Negative   Sapovirus Negative          02/18/2024 1714 02/18/2024 2104 C. difficile Toxin B PCR with reflex to C. difficile Antigen and Toxins A/B EIA [[id number]]    (Abnormal)   Stool from Per Rectum    Final result Component Value   C Difficile Toxin B by PCR Positive Abnormal    Detection of C. difficile nucleic acid in stools confirms the presence of these organisms in diarrheal patients but may not indicate that C. difficile is the etiologic agent of the diarrhea. Results from the Xpert C. difficile assay should be interpreted in conjunction with other laboratory and clinical data available to the clinician.    Patients with a positive C. difficile PCR result will  receive reflex GDH/Toxin Immunoassay testing. Please interpret the PCR test result in conjunction with GDH/Toxin Immunoassay results and the clinical status of patient.          02/18/2024 1714 02/18/2024 2146 C. difficile Antigen and Toxins A/B by Enzyme Immunoassay [[id number]]    Stool from Per Rectum    Final result Component Value   C. difficile GDH Antigen Negative   C. difficile Toxin Negative          02/18/2024 1625 02/19/2024 1437 Urine Culture [[id number]]   Urine, Midstream    Final result Component Value   Culture 50,000-100,000 CFU/mL Mixture of Urogenital Irene             EXAM: CT ABDOMEN AND PELVIS WITH CONTRAST  LOCATION: [facility]  DATE/TIME: 02/23/2024 6:08 AM CST     INDICATION: Abdominal abscess status post percutaneous drain.  COMPARISON: 02/18/2024.     TECHNIQUE: CT scan of the abdomen and pelvis was performed following injection of IV contrast. Delayed phase images were also acquired. Multiplanar reformats were obtained. Dose reduction techniques were used.  CONTRAST: 72 mL Isovue 370.     FINDINGS:     LOWER CHEST: Unremarkable.     HEPATOBILIARY: The gallbladder is moderately distended, but otherwise unremarkable.     SPLEEN: Unremarkable.     PANCREAS: Unremarkable.     ADRENAL GLANDS: Unremarkable.     KIDNEYS/BLADDER: No suspicious renal lesions.     BOWEL: The stomach, small and large bowel are normal in caliber. Several diverticula are present in the colon. Mild wall thickening of the proximal and mid sigmoid colon. Mild haziness is present within the fat about the thick-walled colon. Interval   placement of a percutaneous pigtail drainage catheter via the left anterior pelvic wall into what was a complex fluid collection in the anterior left hemipelvis on the comparison study dated 02/18/2024. No residual fluid is visualized about the distal   portion of the catheter. Skin thickening and haziness within the underlying subcutaneous fat of the anterior  left pelvic wall, about the insertion site of the catheter. No fluid collection in the anterior abdominal wall. A few curvilinear opacities   within the mesenteric fat adjacent to the mid sigmoid colon and near the drainage catheter could possibly represent sinus tracts. Normal appendix.     LYMPH NODES: Unremarkable.     PELVIC ORGANS: Mild enlargement of the prostate gland.     MUSCULOSKELETAL: No acute abnormality.     OTHER: Small bilateral inguinal hernias containing fat. Atherosclerotic calcification in the abdominal aorta.                                                                      IMPRESSION:   1.  Mild wall thickening of the proximal and mid sigmoid colon, in the region of diverticula, similar in appearance to the comparison study. This likely relates to changes associated with diverticulitis. There are a few curvilinear opacities in the   mesenteric fat adjacent to the thick-walled colon again noted that could represent sinus tracts  2.  Interval placement of a percutaneous drainage catheter into what was a complex fluid collection in the anterior pelvis on the comparison study dated 02/18/2024. No residual fluid is present about the catheter.   3.  Skin thickening and haziness within the underlying subcutaneous fat of the anterior left pelvic wall, about the insertion site of the aforementioned catheter. This is nonspecific, but could represent cellulitis. Recommend clinical correlation. No   abscess in the anterior abdominal wall.      EXAM: CT ABDOMEN PELVIS W CONTRAST  LOCATION: [facility]  DATE: 2/18/2024    INDICATION: check for Diverticulitis, abscess, c diff colitis  LLQ pain and diarrhea  COMPARISON: None.  TECHNIQUE: CT scan of the abdomen and pelvis was performed following injection of IV contrast. Multiplanar reformats were obtained. Dose reduction techniques were used.  CONTRAST: 131mL Isovue 370    FINDINGS:  LOWER CHEST: Partially imaged coronary artery  calcifications.    HEPATOBILIARY: Normal.    PANCREAS: Normal.    SPLEEN: Normal.    ADRENAL GLANDS: Normal.    KIDNEYS/BLADDER: No significant mass, obstructing stone, or hydronephrosis. Focal wall thickening of the anterolateral superior bladder wall adjacent to the pelvic fluid collection. No intraluminal gas.    BOWEL: Left-sided diverticulosis with extensive wall thickening and pericolonic fat stranding in the proximal and mid sigmoid colon. There is extensive surrounding extraluminal gas, as well as a multiloculated gas-containing fluid collection anterior and   inferior to the affected segment of sigmoid colon, which measures 8.8 x 5.6 x 4.7 cm (3/181, 6/68). A few loops of small bowel are in close proximity with mild reactive wall thickening. No small bowel dilation.    LYMPH NODES: Normal.    VASCULATURE: Unremarkable.    PELVIC ORGANS: Normal.    MUSCULOSKELETAL: Degenerative changes in the spine.   Impression:     IMPRESSION:  1.  Acute sigmoid diverticulitis complicated by perforation with an adjacent gas containing fluid collection, most likely abscess.    2.  Localized wall thickening of the urinary bladder, likely secondary to the adjacent abscess. No intraluminal gas to suggest colovesical fistula.

## 2024-02-23 NOTE — DISCHARGE INSTRUCTIONS
Pelvic abscess drain home care instructions    1) Change the gauze and tape dressing every 2-3 days. Wash the skin with soap and water and allow to air dry before re dressing  -Please cover with plastic (saran wrap) prior to showering and change it the dressing gets wet.     2) Empty, measure and record the outputs daily.     Please call Ameena SAINI RN clinician at  or Corin IR NP at  for questions or concerns about the tube. You can leave a voicemail. We work Monday through Friday [phone] or [phone].     An alternative number to call for questions is Interventional Radiology at

## 2024-02-24 LAB
POTASSIUM SERPL-SCNC: 3.4 MMOL/L (ref 3.4–5.3)
POTASSIUM SERPL-SCNC: 3.6 MMOL/L (ref 3.4–5.3)

## 2024-02-24 PROCEDURE — 250N000011 HC RX IP 250 OP 636: Performed by: PHYSICIAN ASSISTANT

## 2024-02-24 PROCEDURE — 99232 SBSQ HOSP IP/OBS MODERATE 35: CPT | Performed by: INTERNAL MEDICINE

## 2024-02-24 PROCEDURE — 36415 COLL VENOUS BLD VENIPUNCTURE: CPT | Performed by: INTERNAL MEDICINE

## 2024-02-24 PROCEDURE — 250N000013 HC RX MED GY IP 250 OP 250 PS 637: Performed by: INTERNAL MEDICINE

## 2024-02-24 PROCEDURE — 84132 ASSAY OF SERUM POTASSIUM: CPT | Performed by: INTERNAL MEDICINE

## 2024-02-24 PROCEDURE — 120N000001 HC R&B MED SURG/OB

## 2024-02-24 RX ORDER — POTASSIUM CHLORIDE 1500 MG/1
40 TABLET, EXTENDED RELEASE ORAL ONCE
Status: COMPLETED | OUTPATIENT
Start: 2024-02-24 | End: 2024-02-24

## 2024-02-24 RX ADMIN — AMPICILLIN SODIUM AND SULBACTAM SODIUM 3 G: 2; 1 INJECTION, POWDER, FOR SOLUTION INTRAMUSCULAR; INTRAVENOUS at 00:23

## 2024-02-24 RX ADMIN — AMPICILLIN SODIUM AND SULBACTAM SODIUM 3 G: 2; 1 INJECTION, POWDER, FOR SOLUTION INTRAMUSCULAR; INTRAVENOUS at 12:02

## 2024-02-24 RX ADMIN — AMPICILLIN SODIUM AND SULBACTAM SODIUM 3 G: 2; 1 INJECTION, POWDER, FOR SOLUTION INTRAMUSCULAR; INTRAVENOUS at 06:34

## 2024-02-24 RX ADMIN — VANCOMYCIN HYDROCHLORIDE 125 MG: 125 CAPSULE ORAL at 21:25

## 2024-02-24 RX ADMIN — VANCOMYCIN HYDROCHLORIDE 125 MG: 125 CAPSULE ORAL at 09:00

## 2024-02-24 RX ADMIN — Medication 1 CAPSULE: at 09:00

## 2024-02-24 RX ADMIN — AMPICILLIN SODIUM AND SULBACTAM SODIUM 3 G: 2; 1 INJECTION, POWDER, FOR SOLUTION INTRAMUSCULAR; INTRAVENOUS at 18:04

## 2024-02-24 RX ADMIN — POTASSIUM CHLORIDE 40 MEQ: 1500 TABLET, EXTENDED RELEASE ORAL at 11:05

## 2024-02-24 RX ADMIN — Medication 1 CAPSULE: at 21:25

## 2024-02-24 ASSESSMENT — ACTIVITIES OF DAILY LIVING (ADL)
ADLS_ACUITY_SCORE: 24

## 2024-02-24 NOTE — PLAN OF CARE
Goal Outcome Evaluation:  Summary: Sigmoid diverticulitis complicated by perforation and likely abscess.   Hx of C. difficile infection, prior diverticulitis   Date & Time: 2/23/24 8237-1623  Surgery/POD#: 4 Drain placement, Acute sigmoid diverticulitis with perforation and abscess   Behavior & Aggression: green   Fall Risk: no  Orientation:A&Ox4,  ABNL VS/O2: VSS RA  ABNL Labs: K recheck am   Pain Management:Denied  Bowel/Bladder: Continent B&B, using BR ind, BS active, passing flatus  Drains: MARIA ISABEL drain, dressing changed - drainage milky purulent color    Diet: Low fiber  Activity Level: Ind  Tests/Procedures: ID consulted and following  Anticipated  DC Date: Pending  Significant Information: vanco for C-diff prophylaxis.   -  keep drain in at discharge and plan for CT sinogram in roughly 1 week to reevaluate  - Colorectal planning elective surgical resection in 6-8 weeks

## 2024-02-24 NOTE — PROGRESS NOTES
Date & Time: February 23, 2024 5359-5163   Surgery/POD#: POD 3 Drain placement, Acute sigmoid diverticulitis with perforation and abscess   Behavior & Aggression: Green   Fall Risk: No   Orientation:A&Ox4   ABNL VS/O2:VSS on RA   ABNL Labs: See chart   Pain Management:PRN tylenol   Bowel/Bladder: Voiding adequately, BS active, passing flatus, loose stools   Drains: MARIA ISABEL drain   Diet:Low fiber   Activity Level: Ind   Tests/Procedures: NA   Anticipated  DC Date: Pending   Significant Information: PIV infusing intermittent abx.     DISPLAY PLAN FREE TEXT

## 2024-02-24 NOTE — PROGRESS NOTES
Date & Time: 9311-0352  Surgery/POD#: C Diff colonization with Diverticulitis, Abscess with IR drain placement  Behavior & Aggression: Green  Fall Risk: No  Orientation:x4  ABNL VS/O2:On room air  ABNL Labs: See chart  Pain Management:Denies   Bowel/Bladder: Continent   Drains: PIV, MARIA ISABEL  Diet:LFD  Activity Level: Ind  Tests/Procedures: NA  Anticipated  DC Date: 2/25/24  Significant Information: MARIA ISABEL teaching completed with teach back, patient feels comfortable doing it at home. Otherwise doing well and looking forward to discharge

## 2024-02-24 NOTE — PROGRESS NOTES
Colorectal Surgery Progress Note    Interval History:  No acute events overnight.  Feeling much better diarrhea much improved.  Drain is serous total 10 cc    Physical Exam:   Temp:  [97.5  F (36.4  C)-98.5  F (36.9  C)] 97.5  F (36.4  C)  Pulse:  [55-58] 55  Resp:  [16-18] 18  BP: (127-149)/(66-74) 145/68  SpO2:  [95 %-97 %] 97 %  General: Alert, oriented, appears comfortable, NAD.  Respiratory: breathing non labored  Abdomen: Abdomen is soft, not tender, not distended.  Drain is serous    Data:   All laboratory and imaging data in the past 24 hours reviewed  I/O last 3 completed shifts:  In: 360 [P.O.:360]  Out: 10 [Drains:10]  Recent Labs   Lab Test 02/23/24  0503 02/20/24  0710 02/19/24  0642   WBC 6.1 10.8 13.1*   HGB 12.7* 11.8* 12.0*   * 425 475*      Recent Labs   Lab Test 02/24/24  0603 02/23/24  0503 02/22/24  1054 02/20/24  1350 02/20/24  0710 02/19/24  1716 02/19/24  0642   NA  --  138  --   --  139  --  137   POTASSIUM 3.4 3.4 3.5   < > 3.3*   < > 3.3*   CHLORIDE  --  99  --   --  103  --  101   CO2  --  28  --   --  24  --  24   BUN  --  5.7*  --   --  9.6  --  10.0   CR  --  0.67  --   --  0.67  --  0.78   ANIONGAP  --  11  --   --  12  --  12   LENORE  --  8.6*  --   --  8.2*  --  8.2*   GLC  --  124*  --   --  111*  --  140*    < > = values in this interval not displayed.        Recent Labs   Lab Test 02/18/24  1411   PROTTOTAL 7.2   ALBUMIN 3.1*   BILITOTAL 0.6   ALKPHOS 146   AST 29   ALT 30       Assessment and Plan:     Castillo is a 68-year-old male with history of C. difficile infection and diverticulitis complicated by perforation.  Abscess is drained with IR drain.  That is serous now totals 10 cc      Plan    Continue low fiber diet  Continue antibiotics for C. difficile and diverticulitis  Plan for drain interrogation with a CT side sinogram in 1 week-patient to be sent home with drain and drain teaching  Patient will follow-up in colorectal clinic in 6 to 8 weeks to discuss  surgery  Okay for discharge from colorectal standpoint    Patient discussed with Dr. Burgess Tamara Mancuso MD  Fellow, Colon and Rectal Surgery  [facility]  Pager: [phone]

## 2024-02-24 NOTE — PROGRESS NOTES
[facility]    Medicine Progress Note - Hospitalist Service    Date of Admission:  2/18/2024    Assessment & Plan     Assessment & Plan  Castillo Alba is a 68 year old male with history of C. difficile infection, prior diverticulitis presented to emergency department complaining of abdominal pain and diarrhea.  CT scan of the abdomen pelvis shows acute sigmoid diverticulitis complicated by perforation and likely abscess.     Principal Problem:    Diverticulitis of large intestine with perforation and abscess S/p IR guided drain placement on 2/19 per Colorectal surgery     History of C. difficile colitis consistent with colonization   Admitted  as inpatient  Zosyn IV   Per  Drain, CT images show that the fluid collection is amenable to percutaneous abscess.  She has a call out to IR.  IR consult requested  Colorectal surgery consult requested  S/p IR drain placement. Fluid culture results with proteus, e.coli, strep anginosus   On IV Zosyn switched to IV IV Unasyn per ID  On full liquid diet advanced to low fiber diet on 2/22/2024  Most likely sinogram in 1 week as per colorectal surgery  Blood cultures remain negative  ID consulted and are following   IVF stopped on 2/21/24   CT abdomen pelvis with contrast repeated on 2/23/2024 morning showed the abdominal intra-abdominal abscess mostly resolved.  Small areas of sinus tracts seen.  Mild inflammation in the anterior abdominal wall with possible cellulitis seen     abdominal cellulitis much improved   Plan discharge to home tomorrow after drain care teaching by bedside RN today     H/o recurrent c.diff with chronic c.diff colonization     Patient has a history of antibiotic associated diarrhea; C. difficile toxin was positive on 12/18/2023, he was treated with metronidazole.  on admission Discussed with Pharm.D.  Begin oral vancomycin for prophylaxis while on broad-spectrum antibiotics   C. difficile PCR is positive but GDH antigen and  C. difficile toxin tests are negative. This should mean that patient does not have active C. difficile infection. Continue vancomycin 125 mg p.o. twice daily for C. difficile prophylaxis while on broad-spectrum antibiotics for diverticulitis with abscess.         Active Problems:    Hyponatremia resolved with IVF   Hypokalemia  Likely related to volume depletion from diarrhea  Treat with normal saline  Recheck BMP  -Replacement protocol started       Leukocytosis    Thrombocythemia  Follow-up blood counts  Wbc count improving         Pyuria  Urine culture with a  K mixture of urogenital nuha  As above, continue Zosyn        Tobacco use disorder  Nicotine replacement as needed           Diet: Clear liquid diet per colorectal surgery   DVT Prophylaxis: Pneumatic Compression Devices  [place]ora Catheter: Not present  Lines: None     Cardiac Monitoring: None  Code Status:  Full        Clinically Significant Risk Factors Present on Admission               # Hypoalbuminemia: Lowest albumin = 3.1 g/dL at 2/18/2024  2:11 PM, will monitor as appropriate          # Obesity: Estimated body mass index is 35.26 kg/m  as calculated from the following:    Height as of this encounter: 1.829 m (6').    Weight as of this encounter: 117.9 kg (260 lb).                                 Clinically Significant Risk Factors              # Hypoalbuminemia: Lowest albumin = 3.1 g/dL at 2/18/2024  2:11 PM, will monitor as appropriate            # Obesity: Estimated body mass index is 35.26 kg/m  as calculated from the following:    Height as of this encounter: 1.829 m (6').    Weight as of this encounter: 117.9 kg (260 lb).             Disposition Plan      Expected Discharge Date: 02/24/2024,  3:00 PM      Discharge Comments: Pending abx plan per ID     In the 4-5days when OK with Colorectal and IR      Wife updated on 2/20/24   Discussed with bedside RN and Pt on 2/24/24     Janis Anderson MD  Hospitalist Service  [facility]  [facility]  Securely message with [employer] (more info)  Text page via [employer] Paging/Directory   ______________________________________________________________________    Interval History   Pt resting in bed. Denies any pain.   No n/v, or diarroea.  No other acute issues    Physical Exam   Vital Signs: Temp: 97.5  F (36.4  C) Temp src: Oral BP: (!) 145/68 Pulse: 55   Resp: 18 SpO2: 97 % O2 Device: None (Room air)    Weight: 260 lbs 0 oz    General Appearance: Alert, awake, NAD   Respiratory: CTA b/l   Cardiovascular: RRR  GI: soft, moderately distended, tender in lower abdomen, BS+, abdominal wall cellulitis much improved around the drain site  Skin: warm and dry   Other:      Medical Decision Making       49 MINUTES SPENT BY ME on the date of service doing chart review, history, exam, documentation & further activities per the note.      Data     I have personally reviewed the following data over the past 24 hrs:    N/A  \   N/A   / N/A     N/A N/A N/A /  N/A   3.4 N/A N/A \       Imaging results reviewed over the past 24 hrs:   No results found for this or any previous visit (from the past 24 hour(s)).

## 2024-02-25 VITALS
HEART RATE: 55 BPM | WEIGHT: 260 LBS | SYSTOLIC BLOOD PRESSURE: 120 MMHG | OXYGEN SATURATION: 93 % | DIASTOLIC BLOOD PRESSURE: 69 MMHG | BODY MASS INDEX: 35.21 KG/M2 | HEIGHT: 72 IN | TEMPERATURE: 98.1 F | RESPIRATION RATE: 16 BRPM

## 2024-02-25 LAB — POTASSIUM SERPL-SCNC: 3.5 MMOL/L (ref 3.4–5.3)

## 2024-02-25 PROCEDURE — 250N000013 HC RX MED GY IP 250 OP 250 PS 637: Performed by: INTERNAL MEDICINE

## 2024-02-25 PROCEDURE — 36415 COLL VENOUS BLD VENIPUNCTURE: CPT | Performed by: INTERNAL MEDICINE

## 2024-02-25 PROCEDURE — 84132 ASSAY OF SERUM POTASSIUM: CPT | Performed by: INTERNAL MEDICINE

## 2024-02-25 PROCEDURE — 99239 HOSP IP/OBS DSCHRG MGMT >30: CPT | Performed by: INTERNAL MEDICINE

## 2024-02-25 PROCEDURE — 250N000011 HC RX IP 250 OP 636: Performed by: PHYSICIAN ASSISTANT

## 2024-02-25 RX ORDER — LACTOBACILLUS RHAMNOSUS GG 10B CELL
1 CAPSULE ORAL 2 TIMES DAILY
Qty: 60 CAPSULE | Refills: 0 | Status: ON HOLD | OUTPATIENT
Start: 2024-02-25 | End: 2024-04-16

## 2024-02-25 RX ORDER — ACETAMINOPHEN 325 MG/1
650 TABLET ORAL EVERY 6 HOURS PRN
Qty: 100 TABLET | Refills: 0 | Status: SHIPPED | OUTPATIENT
Start: 2024-02-25

## 2024-02-25 RX ORDER — VANCOMYCIN HYDROCHLORIDE 125 MG/1
125 CAPSULE ORAL 2 TIMES DAILY
Qty: 42 CAPSULE | Refills: 0 | Status: SHIPPED | OUTPATIENT
Start: 2024-02-25 | End: 2024-03-17

## 2024-02-25 RX ADMIN — AMPICILLIN SODIUM AND SULBACTAM SODIUM 3 G: 2; 1 INJECTION, POWDER, FOR SOLUTION INTRAMUSCULAR; INTRAVENOUS at 00:18

## 2024-02-25 RX ADMIN — Medication 1 CAPSULE: at 10:14

## 2024-02-25 RX ADMIN — VANCOMYCIN HYDROCHLORIDE 125 MG: 125 CAPSULE ORAL at 10:14

## 2024-02-25 RX ADMIN — AMOXICILLIN AND CLAVULANATE POTASSIUM 1 TABLET: 875; 125 TABLET, FILM COATED ORAL at 10:13

## 2024-02-25 RX ADMIN — AMPICILLIN SODIUM AND SULBACTAM SODIUM 3 G: 2; 1 INJECTION, POWDER, FOR SOLUTION INTRAMUSCULAR; INTRAVENOUS at 06:17

## 2024-02-25 ASSESSMENT — ACTIVITIES OF DAILY LIVING (ADL)
ADLS_ACUITY_SCORE: 24

## 2024-02-25 NOTE — DISCHARGE SUMMARY
[facility]  Hospitalist Discharge Summary      Date of Admission:  2/18/2024  Date of Discharge:  2/25/2024  Discharging Provider: Janis Anderson MD  Discharge Service: Hospitalist Service    Discharge Diagnoses   Please see hospital course     Clinically Significant Risk Factors     # Obesity: Estimated body mass index is 35.26 kg/m  as calculated from the following:    Height as of this encounter: 1.829 m (6').    Weight as of this encounter: 117.9 kg (260 lb).       Follow-ups Needed After Discharge   Follow-up Appointments     Follow-up and recommended labs and tests       Follow up with primary care provider, Martín Morley, within 7 days   for hospital follow- up.  The following labs/tests are recommended: CBC,   BMP in 1week   F/u with IR for CT abdomen with sinogram for drain management   F/u with Colorectal surgery in 6weeks .           Unresulted Labs Ordered in the Past 30 Days of this Admission       No orders found from 1/19/2024 to 2/19/2024.            Discharge Disposition   Discharged to home with family  Condition at discharge: Stable    Hospital Course   Castillo Alba is a 68 year old male with history of C. difficile infection, prior diverticulitis presented to emergency department complaining of abdominal pain and diarrhea.  CT scan of the abdomen pelvis shows acute sigmoid diverticulitis complicated by perforation and likely abscess.     Principal Problem:    Diverticulitis of large intestine with perforation and abscess S/p IR guided drain placement on 2/19 per Colorectal surgery     History of C. difficile colitis consistent with colonization   Admitted  as inpatient  Zosyn IV   Per DrMarleny Drain, CT images show that the fluid collection is amenable to percutaneous abscess.  She has a call out to IR.  IR consult requested  Colorectal surgery consult requested  S/p IR drain placement. Fluid culture results with proteus, e.coli, strep anginosus   On IV Zosyn  switched to IV IV Unasyn per ID discharged on 2 weeks of Augmentin as per ID.  Continue oral vancomycin for C. difficile prophylaxis 1 week after completion of antibiotics  On full liquid diet advanced to low fiber diet on 2/22/2024  Most likely sinogram in 1 week as per colorectal surgery  Blood cultures remain negative  ID consulted and are following   IVF stopped on 2/21/24   CT abdomen pelvis with contrast repeated on 2/23/2024 morning showed the abdominal intra-abdominal abscess mostly resolved.  Small areas of sinus tracts seen.  Mild inflammation in the anterior abdominal wall with possible cellulitis seen      abdominal cellulitis much improved     Needs IR follow-up with CT abdomen sinogram in 1 week for drain removal    Follow-up with colorectal surgery in 6 to 8 weeks for colectomy       H/o recurrent c.diff with chronic c.diff colonization      Patient has a history of antibiotic associated diarrhea; C. difficile toxin was positive on 12/18/2023, he was treated with metronidazole.  on admission Discussed with Pharm.D.  Begin oral vancomycin for prophylaxis while on broad-spectrum antibiotics   C. difficile PCR is positive but GDH antigen and C. difficile toxin tests are negative. This should mean that patient does not have active C. difficile infection. Continue vancomycin 125 mg p.o. twice daily for C. difficile prophylaxis while on broad-spectrum antibiotics for diverticulitis with abscess.            Active Problems:    Hyponatremia resolved with IVF   Hypokalemia  Likely related to volume depletion from diarrhea  Treat with normal saline  Recheck BMP  -Replacement protocol started       Leukocytosis    Thrombocythemia  Follow-up blood counts  Wbc count improving         Pyuria  Urine culture with a  K mixture of urogenital nuha  As above, continue Zosyn        Tobacco use disorder  Nicotine replacement as needed           Diet: Clear liquid diet per colorectal surgery   DVT Prophylaxis:  Pneumatic Compression Devices  Zamora Catheter: Not present  Lines: None     Cardiac Monitoring: None  Code Status:  Full        Clinically Significant Risk Factors Present on Admission               # Hypoalbuminemia: Lowest albumin = 3.1 g/dL at 2/18/2024  2:11 PM, will monitor as appropriate          # Obesity: Estimated body mass index is 35.26 kg/m  as calculated from the following:    Height as of this encounter: 1.829 m (6').    Weight as of this encounter: 117.9 kg (260 lb).                                          Clinically Significant Risk Factors               # Hypoalbuminemia: Lowest albumin = 3.1 g/dL at 2/18/2024  2:11 PM, will monitor as appropriate            # Obesity: Estimated body mass index is 35.26 kg/m  as calculated from the following:    Height as of this encounter: 1.829 m (6').    Weight as of this encounter: 117.9 kg (260 lb).                    Disposition Plan     Expected Discharge Date: 02/24/2024,  3:00 PM      Discharge Comments: Pending abx plan per ID          Home today in stable condition with family     Janis Anderson MD  Hospitalist Service  [facility]  Securely message with Vocera (more info)  Text page via [employer] Paging/Directory     Consultations This Hospital Stay   COLORECTAL SURGERY IP CONSULT  INTERVENTIONAL RADIOLOGY ADULT/PEDS IP CONSULT  INFECTIOUS DISEASES IP CONSULT  PHYSICAL THERAPY ADULT IP CONSULT    Code Status   Full Code    Time Spent on this Encounter   IJanis MD, personally saw the patient today and spent greater than 30 minutes discharging this patient.       Janis Anderson MD  [facility]  [address] [place] [id number]  Phone: [phone]  Fax: [phone]  ______________________________________________________________________    Physical Exam   Vital Signs: Temp: 98.1  F (36.7  C) Temp src: Oral BP: 120/69 Pulse: 55   Resp: 16 SpO2: 93 % O2 Device: None (Room air)    Weight:  260 lbs 0 oz  General Appearance: Alert awake, not in acute distress  Respiratory: Clear to auscultation bilaterally  Cardiovascular: Normal rate rhythm regular  GI: Soft, nontender nondistended bowel sounds positive left lower quadrant percutaneous drain in place.  Abdominal cellulitis around the drain in place much improved  Skin: Warm and dry  Other: No lower extremity edema       Primary Care Physician   Martín Morley    Discharge Orders      Reason for your hospital stay    Perforated diverticulitis with abscess formation S/p Drain placement by IR. On antibiotics for ID. F/u with colorectal in 6weeks for possible surgery of colon     Follow-up and recommended labs and tests     Follow up with primary care provider, Martín Morley, within 7 days for hospital follow- up.  The following labs/tests are recommended: CBC, BMP in 1week   F/u with IR for CT abdomen with sinogram for drain management   F/u with Colorectal surgery in 6weeks .     Activity    Your activity upon discharge: activity as tolerated     Full Code     Diet    Follow this diet upon discharge: Orders Placed This Encounter      Low Fiber Diet       Significant Results and Procedures   Most Recent 3 CBC's:  Recent Labs   Lab Test 02/23/24  0503 02/20/24  0710 02/19/24  0642   WBC 6.1 10.8 13.1*   HGB 12.7* 11.8* 12.0*   MCV 87 87 87   * 425 475*     Most Recent 3 BMP's:  Recent Labs   Lab Test 02/25/24  0637 02/24/24  1503 02/24/24  0603 02/23/24  0503 02/20/24  1350 02/20/24  0710 02/19/24  1716 02/19/24  0642   NA  --   --   --  138  --  139  --  137   POTASSIUM 3.5 3.6 3.4 3.4   < > 3.3*   < > 3.3*   CHLORIDE  --   --   --  99  --  103  --  101   CO2  --   --   --  28  --  24  --  24   BUN  --   --   --  5.7*  --  9.6  --  10.0   CR  --   --   --  0.67  --  0.67  --  0.78   ANIONGAP  --   --   --  11  --  12  --  12   LENORE  --   --   --  8.6*  --  8.2*  --  8.2*   GLC  --   --   --  124*  --  111*  --  140*    < > = values in  this interval not displayed.     Most Recent 2 LFT's:  Recent Labs   Lab Test 02/18/24  1411 10/17/19  1008   AST 29 22   ALT 30 41   ALKPHOS 146 84   BILITOTAL 0.6 0.9     Most Recent 3 INR's:No lab results found.  Most Recent INR's and Anticoagulation Dosing History:  Anticoagulation Dose History           No data to display              Most Recent 3 Creatinines:  Recent Labs   Lab Test 02/23/24  0503 02/20/24  0710 02/19/24  0642   CR 0.67 0.67 0.78     Most Recent 3 Hemoglobins:  Recent Labs   Lab Test 02/23/24  0503 02/20/24  0710 02/19/24  0642   HGB 12.7* 11.8* 12.0*     Most Recent 3 Troponin's:  Recent Labs   Lab Test 10/17/19  1008   TROPI <0.015     Most Recent 3 BNP's:  Recent Labs   Lab Test 10/17/19  1008   NTBNPI 26     Most Recent D-dimer:No lab results found.  Most Recent Cholesterol Panel:No lab results found.  7-Day Micro Results       Collected Updated Procedure Result Status      02/19/2024 1035 02/22/2024 1041 Anaerobic Bacterial Culture Routine [[id number]]   (Abnormal)   Abscess from Abdomen    Final result Component Value   Culture 4+ Parabacteroides distasonis    Identification obtained by MALDI-TOF mass spectrometry research use only database. Test characteristics determined and verified by the Infectious Diseases Diagnostic Laboratory.  Susceptibilities not routinely done, refer to antibiogram to view typical susceptibility profiles    4+ Bacteroides thetaiotaomicron    Susceptibilities not routinely done, refer to antibiogram to view typical susceptibility profiles    4+ Mixed Anaerobic Organisms Present               02/19/2024 1035 02/19/2024 1418 Gram Stain [[id number]]   (Abnormal)   Abscess from Abdomen    Final result Component Value   Gram Stain Result 4+ Gram positive cocci   Gram Stain Result 3+ Gram positive bacilli   Gram Stain Result 3+ Gram negative bacilli   Gram Stain Result 4+ WBC seen   Predominantly PMNs            02/19/2024 1035 02/22/2024 1037 Abscess Aerobic  Bacterial Culture Routine [[id number]]    (Abnormal)   Abscess from Abdomen    Final result Component Value   Culture 4+ Proteus mirabilis    2+ Escherichia coli    3+ Streptococcus anginosus    This organism is susceptible to ampicillin, penicillin, vancomycin and the cephalosporins. If treatment is required and your patient is allergic to penicillin, contact the microbiology lab within 5 days to request susceptibility testing.    3+ [place] (Actinomyces) odontolytica    Susceptibilities not routinely done, refer to antibiogram to view typical susceptibility profiles        Susceptibility        Proteus mirabilis      PILI      Ampicillin <=2 ug/mL Susceptible      Ampicillin/ Sulbactam <=2 ug/mL Susceptible      Cefepime <=1 ug/mL Susceptible      Ceftazidime <=1 ug/mL Susceptible      Ceftriaxone <=1 ug/mL Susceptible      Ciprofloxacin <=0.25 ug/mL Susceptible      Gentamicin <=1 ug/mL Susceptible      Levofloxacin <=0.12 ug/mL Susceptible      Meropenem <=0.25 ug/mL Susceptible      Piperacillin/Tazobactam <=4 ug/mL Susceptible      Tobramycin <=1 ug/mL Susceptible      Trimethoprim/Sulfamethoxazole <=1/19 ug/mL Susceptible                      Susceptibility        Escherichia coli      PILI      Ampicillin 4 ug/mL Susceptible      Ampicillin/ Sulbactam <=2 ug/mL Susceptible      Cefepime <=1 ug/mL Susceptible      Ceftazidime <=1 ug/mL Susceptible      Ceftriaxone <=1 ug/mL Susceptible      Ciprofloxacin <=0.25 ug/mL Susceptible      Gentamicin <=1 ug/mL Susceptible      Levofloxacin <=0.12 ug/mL Susceptible      Meropenem <=0.25 ug/mL Susceptible      Piperacillin/Tazobactam <=4 ug/mL Susceptible      Tobramycin <=1 ug/mL Susceptible      Trimethoprim/Sulfamethoxazole <=1/19 ug/mL Susceptible                           02/18/2024 1827 02/23/2024 2246 Blood Culture Peripheral Blood [[id number]]   Peripheral Blood    Final result Component Value   Culture No Growth               02/18/2024 1821 02/23/2024  2246 Blood Culture Peripheral Blood [[id number]]   Peripheral Blood    Final result Component Value   Culture No Growth               02/18/2024 1714 02/18/2024 2144 Enteric Bacteria and Virus Panel PCR [[id number]]    Stool from Per Rectum    Final result Component Value   Campylobacter species Negative   Salmonella species Negative   Vibrio species Negative   Vibrio cholerae Negative   Yersinia enterocolitica Negative   Enteropathogenic E. coli (EPEC) Negative   Shiga-like toxin-producing E. coli (STEC) Negative   Shigella/Enteroinvasive E. coli (EIEC) Negative   Cryptosporidium species Negative   Giardia lamblia Negative   Norovirus Gl/Gll Negative   Rotavirus A Negative   Plesiomonas shigelloides Negative   Enteroaggregative E. coli (EAEC) Negative   Enterotoxigenic E. coli (ETEC) Negative   E. coli O157 NA   Cyclospora cayetanensis Negative   Entamoeba histolytica Negative   Adenovirus F40/41 Negative   Astrovirus Negative   Sapovirus Negative            02/18/2024 1714 02/18/2024 2104 C. difficile Toxin B PCR with reflex to C. difficile Antigen and Toxins A/B EIA [[id number]]    (Abnormal)   Stool from Per Rectum    Final result Component Value   C Difficile Toxin B by PCR Positive   Detection of C. difficile nucleic acid in stools confirms the presence of these organisms in diarrheal patients but may not indicate that C. difficile is the etiologic agent of the diarrhea. Results from the Xpert C. difficile assay should be interpreted in conjunction with other laboratory and clinical data available to the clinician.    Patients with a positive C. difficile PCR result will receive reflex GDH/Toxin Immunoassay testing. Please interpret the PCR test result in conjunction with GDH/Toxin Immunoassay results and the clinical status of patient.            02/18/2024 1714 02/18/2024 2146 C. difficile Antigen and Toxins A/B by Enzyme Immunoassay [[id number]]    Stool from Per Rectum    Final result Component  Value   C. difficile GDH Antigen Negative   C. difficile Toxin Negative            02/18/2024 1625 02/19/2024 1437 Urine Culture [[id number]]   Urine, Midstream    Final result Component Value   Culture 50,000-100,000 CFU/mL Mixture of Urogenital Irene                     Most Recent TSH and T4:No lab results found.  Most Recent Hemoglobin A1c:No lab results found.  Most Recent 6 glucoses:  Recent Labs   Lab Test 02/23/24  0503 02/20/24  0710 02/19/24  0642 02/18/24  1411 10/17/19  1008   * 111* 140* 175* 160*     Most Recent Urinalysis:  Recent Labs   Lab Test 02/18/24  1625   COLOR Orange*   APPEARANCE Slightly Cloudy*   URINEGLC 50*   URINEBILI Small*   URINEKETONE Trace*   SG 1.029   UBLD Moderate*   URINEPH 5.5   PROTEIN 200*   NITRITE Negative   LEUKEST Moderate*   RBCU 35*   WBCU 42*     Most Recent ABG:No lab results found.  Most Recent ESR & CRP:No lab results found.  Most Recent Anemia Panel:  Recent Labs   Lab Test 02/23/24  0503   WBC 6.1   HGB 12.7*   HCT 38.3*   MCV 87   *     Most Recent CPK:No lab results found.,   Results for orders placed or performed during the hospital encounter of 02/18/24   CT Abdomen Pelvis w Contrast     Value    Radiologist flags Perforated viscus (AA)    Narrative    EXAM: CT ABDOMEN PELVIS W CONTRAST  LOCATION: [facility]  DATE: 2/18/2024    INDICATION: check for Diverticulitis, abscess, c diff colitis  LLQ pain and diarrhea  COMPARISON: None.  TECHNIQUE: CT scan of the abdomen and pelvis was performed following injection of IV contrast. Multiplanar reformats were obtained. Dose reduction techniques were used.  CONTRAST: 131mL Isovue 370    FINDINGS:   LOWER CHEST: Partially imaged coronary artery calcifications.    HEPATOBILIARY: Normal.    PANCREAS: Normal.    SPLEEN: Normal.    ADRENAL GLANDS: Normal.    KIDNEYS/BLADDER: No significant mass, obstructing stone, or hydronephrosis. Focal wall thickening of the anterolateral  superior bladder wall adjacent to the pelvic fluid collection. No intraluminal gas.    BOWEL: Left-sided diverticulosis with extensive wall thickening and pericolonic fat stranding in the proximal and mid sigmoid colon. There is extensive surrounding extraluminal gas, as well as a multiloculated gas-containing fluid collection anterior and   inferior to the affected segment of sigmoid colon, which measures 8.8 x 5.6 x 4.7 cm (3/181, 6/68). A few loops of small bowel are in close proximity with mild reactive wall thickening. No small bowel dilation.    LYMPH NODES: Normal.    VASCULATURE: Unremarkable.    PELVIC ORGANS: Normal.    MUSCULOSKELETAL: Degenerative changes in the spine.      Impression    IMPRESSION:   1.  Acute sigmoid diverticulitis complicated by perforation with an adjacent gas containing fluid collection, most likely abscess.    2.  Localized wall thickening of the urinary bladder, likely secondary to the adjacent abscess. No intraluminal gas to suggest colovesical fistula.    [Critical Result: Perforated viscus]    Finding was identified on 2/18/2024 5:57 PM CST.     Dr. Vivian Montes was contacted by me on 2/18/2024 6:02 PM CST and verbalized understanding of the critical result.   CT Abdomen Peritoneum Abscess Drain w Cath Place    Narrative    EXAM:  1. PERCUTANEOUS DRAIN PLACEMENT LEFT LOWER QUADRANT ABSCESS  2. CT GUIDANCE  3. CONSCIOUS SEDATION  DATE/TIME: 2/19/2024 10:34 AM    INDICATION: Diverticular abscess  TECHNIQUE: Dose reduction techniques were used.    PROCEDURE: Informed consent obtained. Site marked. Prior images  reviewed. Required items made available. Patient identity confirmed  verbally and with arm band. Patient reevaluated immediately before  administering sedation. Universal protocol was followed. Time out  performed. The site was prepped and draped in sterile fashion. 10 mL  of 1% lidocaine was infused into the local soft tissues. Using  standard technique and under direct  CT guidance, a 12 [place] drainage  catheter was inserted into the fluid collection.      SPECIMEN: 50 mL of brown, malodorous fluid was aspirated and sent to  lab for cultures and Gram stain.    BLOOD LOSS: Minimal.    The patient tolerated the procedure well. No immediate complications.    RADIOLOGIC SUPERVISION AND INTERPRETATION:   CT GUIDANCE: Images demonstrate the needle and subsequent catheter to  be in good position.    SEDATION: Versed 1 mg. Fentanyl 50 mcg. The procedure was performed  with administration intravenous conscious sedation with appropriate  preoperative, intraoperative, and postoperative evaluation.    15 minutes of supervised face to face conscious sedation time was  provided by a radiology nurse under my direct supervision.      Impression    IMPRESSION:  1.  Successful CT-guided drain placement into a diverticular abscess.    KULDIP KIRBY MD         SYSTEM ID:  [id number]   CTV Abdomen Pelvis w Contrast    Narrative    EXAM: CT ABDOMEN AND PELVIS WITH CONTRAST  LOCATION: [facility]  DATE/TIME: 02/23/2024 6:08 AM CST    INDICATION: Abdominal abscess status post percutaneous drain.  COMPARISON: 02/18/2024.    TECHNIQUE: CT scan of the abdomen and pelvis was performed following injection of IV contrast. Delayed phase images were also acquired. Multiplanar reformats were obtained. Dose reduction techniques were used.  CONTRAST: 72 mL Isovue 370.    FINDINGS:    LOWER CHEST: Unremarkable.    HEPATOBILIARY: The gallbladder is moderately distended, but otherwise unremarkable.    SPLEEN: Unremarkable.    PANCREAS: Unremarkable.    ADRENAL GLANDS: Unremarkable.    KIDNEYS/BLADDER: No suspicious renal lesions.    BOWEL: The stomach, small and large bowel are normal in caliber. Several diverticula are present in the colon. Mild wall thickening of the proximal and mid sigmoid colon. Mild haziness is present within the fat about the thick-walled colon. Interval   placement of  a percutaneous pigtail drainage catheter via the left anterior pelvic wall into what was a complex fluid collection in the anterior left hemipelvis on the comparison study dated 02/18/2024. No residual fluid is visualized about the distal   portion of the catheter. Skin thickening and haziness within the underlying subcutaneous fat of the anterior left pelvic wall, about the insertion site of the catheter. No fluid collection in the anterior abdominal wall. A few curvilinear opacities   within the mesenteric fat adjacent to the mid sigmoid colon and near the drainage catheter could possibly represent sinus tracts. Normal appendix.    LYMPH NODES: Unremarkable.    PELVIC ORGANS: Mild enlargement of the prostate gland.    MUSCULOSKELETAL: No acute abnormality.    OTHER: Small bilateral inguinal hernias containing fat. Atherosclerotic calcification in the abdominal aorta.      Impression    IMPRESSION:   1.  Mild wall thickening of the proximal and mid sigmoid colon, in the region of diverticula, similar in appearance to the comparison study. This likely relates to changes associated with diverticulitis. There are a few curvilinear opacities in the   mesenteric fat adjacent to the thick-walled colon again noted that could represent sinus tracts  2.  Interval placement of a percutaneous drainage catheter into what was a complex fluid collection in the anterior pelvis on the comparison study dated 02/18/2024. No residual fluid is present about the catheter.   3.  Skin thickening and haziness within the underlying subcutaneous fat of the anterior left pelvic wall, about the insertion site of the aforementioned catheter. This is nonspecific, but could represent cellulitis. Recommend clinical correlation. No   abscess in the anterior abdominal wall.       Discharge Medications   Current Discharge Medication List        START taking these medications    Details   acetaminophen (TYLENOL) 325 MG tablet Take 2 tablets (650 mg)  by mouth every 6 hours as needed for mild pain  Qty: 100 tablet, Refills: 0    Associated Diagnoses: Diverticulitis of intestine with abscess and bleeding, unspecified part of intestinal tract      amoxicillin-clavulanate (AUGMENTIN) 875-125 MG tablet Take 1 tablet by mouth every 12 hours for 14 days  Qty: 28 tablet, Refills: 0    Associated Diagnoses: Diverticulitis of intestine with abscess and bleeding, unspecified part of intestinal tract      lactobacillus rhamnosus, GG, (CULTURELL) capsule Take 1 capsule by mouth 2 times daily  Qty: 60 capsule, Refills: 0    Associated Diagnoses: Diverticulitis of intestine with abscess and bleeding, unspecified part of intestinal tract      nicotine (NICODERM CQ) 7 MG/24HR 24 hr patch Place 1 patch onto the skin daily as needed for nicotine withdrawal symptoms  Qty: 30 patch, Refills: 0    Associated Diagnoses: Diverticulitis of intestine with abscess and bleeding, unspecified part of intestinal tract      vancomycin (VANCOCIN) 125 MG capsule Take 1 capsule (125 mg) by mouth 2 times daily for 21 days  Qty: 42 capsule, Refills: 0    Associated Diagnoses: Clostridium difficile carrier           Allergies   No Known Allergies

## 2024-02-25 NOTE — PROGRESS NOTES
Discharge Note    Patient discharged to home via private vehicle  accompanied by significant other .  IV: Discontinued  Prescriptions filled and given to patient/family.   Belongings reviewed and sent with patient.   Home medications returned to patient: NA  Equipment sent with: patient.   patient verbalizes understanding of discharge instructions. AVS given to patient.

## 2024-02-25 NOTE — PLAN OF CARE
Goal Outcome Evaluation:      Plan of Care Reviewed With: patient    Overall Patient Progress: improvingOverall Patient Progress: improving     Summary: Sigmoid diverticulitis complicated by perforation and likely abscess.   Hx of C. difficile infection, prior diverticulitis   Date & Time: 2/24/24 9851-3189  Surgery/POD#: 5 Drain placement, Acute sigmoid diverticulitis with perforation and abscess   Behavior & Aggression: green   Fall Risk: no  Orientation:A&Ox4,  ABNL VS/O2: VSS RA  ABNL Labs: K recheck 3.6 ordered for am  Pain Management:Denied  Bowel/Bladder: Continent B&B, using BR ind, BS active, passing flatus  Drains: MARIA ISABEL drain LLQ,  flushed per orders   Diet: Low fiber  Activity Level: Ind  Tests/Procedures: ID consulted and following  Anticipated  DC Date: tomorrow  Significant Information: vanco for C-diff prophylaxis.

## 2024-02-25 NOTE — PLAN OF CARE
Goal Outcome Evaluation:  Date & Time: February 25, 2024 [id number]   Surgery/POD#: POD 4 Drain placement, Acute sigmoid diverticulitis with perforation and abscess   Behavior & Aggression: Green   Fall Risk: No   Orientation:A&Ox4   ABNL VS/O2:VSS on RA   ABNL Labs: See chart   Pain Management:denies  Bowel/Bladder: Voiding adequately, BS active, passing flatus, loose stools   Drains: MARIA ISABEL drain   Diet:Low fiber   Activity Level: Ind   Tests/Procedures: NA   Anticipated  DC Date: Today  Significant Information: PIV infusing intermittent abx.

## 2024-02-26 ENCOUNTER — MEDICAL CORRESPONDENCE (OUTPATIENT)
Dept: HEALTH INFORMATION MANAGEMENT | Facility: CLINIC | Age: 69
End: 2024-02-26
Payer: COMMERCIAL

## 2024-02-26 ENCOUNTER — PATIENT OUTREACH (OUTPATIENT)
Dept: CARE COORDINATION | Facility: CLINIC | Age: 69
End: 2024-02-26
Payer: COMMERCIAL

## 2024-02-26 ENCOUNTER — TELEPHONE (OUTPATIENT)
Dept: OTHER | Facility: CLINIC | Age: 69
End: 2024-02-26
Payer: COMMERCIAL

## 2024-02-26 NOTE — TELEPHONE ENCOUNTER
Wife called regarding recording outputs.  The specimen cup only starts at 15 ml, and his outputs are less.  Discussed that we record outputs for a guide and to just record less than 15 ml.  Patient is scheduled for CT sinogram on 3/5  Discuss we want to see outputs less than 15 ml for consecutive days.  Instructed to call if questions.  Ameena Augustin RN  IR nurse clinician  [phone]

## 2024-02-26 NOTE — PROGRESS NOTES
[facility]: [facility]    Background: Transitional Care Management program identified per system criteria and reviewed by [facility] team for possible outreach.    Assessment: Upon chart review, [facility] Team member will not proceed with patient outreach related to this episode of Transitional Care Management program due to reason below:    Patient has active communication with a nurse, provider or care team for reason of post-hospital follow up plan.  Outreach call by [facility] team not indicated to minimize duplicative efforts.     Plan: Transitional Care Management episode addressed appropriately per reason noted above.      Alma Delia Padilla RN  [facility] [place], [facility]    *Connected Care Resource Team does NOT follow patient ongoing. Referrals are identified based on internal discharge reports and the outreach is to ensure patient has an understanding of their discharge instructions.

## 2024-03-05 ENCOUNTER — HOSPITAL ENCOUNTER (OUTPATIENT)
Dept: CT IMAGING | Facility: CLINIC | Age: 69
Discharge: HOME | End: 2024-03-05
Attending: COLON & RECTAL SURGERY | Admitting: COLON & RECTAL SURGERY
Payer: COMMERCIAL

## 2024-03-05 DIAGNOSIS — K57.92 DIVERTICULITIS: ICD-10-CM

## 2024-03-05 PROCEDURE — 20501 NJX SINUS TRACT DIAGNOSTIC: CPT

## 2024-03-05 PROCEDURE — 76080 X-RAY EXAM OF FISTULA: CPT

## 2024-03-08 ENCOUNTER — MEDICAL CORRESPONDENCE (OUTPATIENT)
Dept: HEALTH INFORMATION MANAGEMENT | Facility: CLINIC | Age: 69
End: 2024-03-08

## 2024-03-11 ENCOUNTER — TELEPHONE (OUTPATIENT)
Dept: OTHER | Facility: CLINIC | Age: 69
End: 2024-03-11
Payer: COMMERCIAL

## 2024-03-11 NOTE — TELEPHONE ENCOUNTER
Contacted wife, discussed that to change dressing every third day. Watch for signs of infection, do not flush.  Discuss we do not routinely do dressing changes for patient if they are able to do it on their own.  Ameena Augustin RN  IR nurse clinician  [phone]

## 2024-03-24 ENCOUNTER — HEALTH MAINTENANCE LETTER (OUTPATIENT)
Age: 69
End: 2024-03-24

## 2024-04-03 ENCOUNTER — HOSPITAL ENCOUNTER (OUTPATIENT)
Dept: CT IMAGING | Facility: CLINIC | Age: 69
Discharge: HOME | End: 2024-04-03
Attending: RADIOLOGY | Admitting: RADIOLOGY
Payer: COMMERCIAL

## 2024-04-03 DIAGNOSIS — Z98.890 H/O DRAINAGE OF ABSCESS: ICD-10-CM

## 2024-04-03 PROCEDURE — 20501 NJX SINUS TRACT DIAGNOSTIC: CPT

## 2024-04-03 NOTE — PROGRESS NOTES
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 He is here today for a CT sinogram. He is scheduled for surgery 4/19 for a hemicolectomy.     Patient is not flushing the drain, he though the stitch was broken, outputs are minimal and was wondering if he had to keep the drain in. Drainage appears feculent, tan, thicker and smells like stool. There is a small amount in the MARIA ISABEL bulb.     15 mL contrast injected in the drain with a small amount of leaking at the site and no pain. There appears to be continued communication to the bowel.     The dressing was changed and tube secured to his skin so it wasn't putting as much pressure on the stitch site. A new MARIA ISABEL bulb and tubing were placed.     Total time: 25 minutes      Thanks, Corin [facility] CNP ([phone]) (phone [phone])

## 2024-04-16 ENCOUNTER — HOSPITAL ENCOUNTER (OUTPATIENT)
Facility: CLINIC | Age: 69
Discharge: HOME | End: 2024-04-16
Attending: COLON & RECTAL SURGERY | Admitting: COLON & RECTAL SURGERY
Payer: COMMERCIAL

## 2024-04-16 ENCOUNTER — ANESTHESIA EVENT (OUTPATIENT)
Dept: SURGERY | Facility: CLINIC | Age: 69
DRG: 329 | End: 2024-04-16
Payer: COMMERCIAL

## 2024-04-16 VITALS
HEART RATE: 65 BPM | HEIGHT: 72 IN | BODY MASS INDEX: 33.18 KG/M2 | DIASTOLIC BLOOD PRESSURE: 68 MMHG | SYSTOLIC BLOOD PRESSURE: 125 MMHG | OXYGEN SATURATION: 99 % | RESPIRATION RATE: 16 BRPM | WEIGHT: 245 LBS

## 2024-04-16 LAB — COLONOSCOPY: NORMAL

## 2024-04-16 PROCEDURE — 250N000011 HC RX IP 250 OP 636: Performed by: COLON & RECTAL SURGERY

## 2024-04-16 PROCEDURE — 99153 MOD SED SAME PHYS/QHP EA: CPT | Performed by: COLON & RECTAL SURGERY

## 2024-04-16 PROCEDURE — 258N000003 HC RX IP 258 OP 636: Performed by: COLON & RECTAL SURGERY

## 2024-04-16 PROCEDURE — G0121 COLON CA SCRN NOT HI RSK IND: HCPCS | Performed by: COLON & RECTAL SURGERY

## 2024-04-16 PROCEDURE — G0500 MOD SEDAT ENDO SERVICE >5YRS: HCPCS | Performed by: COLON & RECTAL SURGERY

## 2024-04-16 PROCEDURE — 45378 DIAGNOSTIC COLONOSCOPY: CPT | Performed by: COLON & RECTAL SURGERY

## 2024-04-16 RX ORDER — NALOXONE HYDROCHLORIDE 0.4 MG/ML
0.4 INJECTION, SOLUTION INTRAMUSCULAR; INTRAVENOUS; SUBCUTANEOUS
Status: DISCONTINUED | OUTPATIENT
Start: 2024-04-16 | End: 2024-04-16 | Stop reason: HOSPADM

## 2024-04-16 RX ORDER — ONDANSETRON 2 MG/ML
4 INJECTION INTRAMUSCULAR; INTRAVENOUS EVERY 6 HOURS PRN
Status: DISCONTINUED | OUTPATIENT
Start: 2024-04-16 | End: 2024-04-16 | Stop reason: HOSPADM

## 2024-04-16 RX ORDER — FLUMAZENIL 0.1 MG/ML
0.2 INJECTION, SOLUTION INTRAVENOUS
Status: DISCONTINUED | OUTPATIENT
Start: 2024-04-16 | End: 2024-04-16 | Stop reason: HOSPADM

## 2024-04-16 RX ORDER — LIDOCAINE 40 MG/G
CREAM TOPICAL
Status: DISCONTINUED | OUTPATIENT
Start: 2024-04-16 | End: 2024-04-16 | Stop reason: HOSPADM

## 2024-04-16 RX ORDER — NALOXONE HYDROCHLORIDE 0.4 MG/ML
0.2 INJECTION, SOLUTION INTRAMUSCULAR; INTRAVENOUS; SUBCUTANEOUS
Status: DISCONTINUED | OUTPATIENT
Start: 2024-04-16 | End: 2024-04-16 | Stop reason: HOSPADM

## 2024-04-16 RX ORDER — PROCHLORPERAZINE MALEATE 5 MG
5 TABLET ORAL EVERY 6 HOURS PRN
Status: DISCONTINUED | OUTPATIENT
Start: 2024-04-16 | End: 2024-04-16 | Stop reason: HOSPADM

## 2024-04-16 RX ORDER — METRONIDAZOLE 500 MG/1
500 TABLET ORAL 3 TIMES DAILY
Status: ON HOLD | COMMUNITY
End: 2024-04-19

## 2024-04-16 RX ORDER — ONDANSETRON 4 MG/1
4 TABLET, ORALLY DISINTEGRATING ORAL EVERY 6 HOURS PRN
Status: DISCONTINUED | OUTPATIENT
Start: 2024-04-16 | End: 2024-04-16 | Stop reason: HOSPADM

## 2024-04-16 RX ORDER — VANCOMYCIN HYDROCHLORIDE 125 MG/1
125 CAPSULE ORAL 2 TIMES DAILY
COMMUNITY
End: 2024-04-24

## 2024-04-16 RX ORDER — FENTANYL CITRATE 50 UG/ML
INJECTION, SOLUTION INTRAMUSCULAR; INTRAVENOUS PRN
Status: DISCONTINUED | OUTPATIENT
Start: 2024-04-16 | End: 2024-04-16 | Stop reason: HOSPADM

## 2024-04-16 RX ORDER — ONDANSETRON 2 MG/ML
4 INJECTION INTRAMUSCULAR; INTRAVENOUS
Status: DISCONTINUED | OUTPATIENT
Start: 2024-04-16 | End: 2024-04-16 | Stop reason: HOSPADM

## 2024-04-16 ASSESSMENT — ACTIVITIES OF DAILY LIVING (ADL)
ADLS_ACUITY_SCORE: 37
ADLS_ACUITY_SCORE: 37

## 2024-04-16 NOTE — H&P
Pre-Endoscopy History and Physical     Castillo Alba MRN# [id number]   YOB: 1955 Age: 68 year old     Date of Procedure: 24  Primary care provider: Martín Morley  Type of Endoscopy: Colonoscopy  Reason for Procedure: screening  Type of Anesthesia Anticipated: Moderate Sedation    HPI:    Castillo is a 68 year old male who will be undergoing the above procedure.      A history and physical has been performed. The patient's medications and allergies have been reviewed. The risks and benefits of the procedure and the sedation options and risks were discussed with the patient.  All questions were answered and informed consent was obtained.      He denies a personal or family history of anesthesia complications or bleeding disorders.     No Known Allergies   Allergy to Latex: NO   Allergy to tape: NO   Intolerances: n/a    No current facility-administered medications for this encounter.       Patient Active Problem List   Diagnosis    Diverticulitis of large intestine with perforation and abscess    Hyponatremia    Leukocytosis    Thrombocythemia    Pyuria    Tobacco use disorder    Diverticulitis of colon with perforation    Abscess of sigmoid colon due to diverticulitis    Acute sepsis (H)    Diverticulitis of intestine with abscess and bleeding, unspecified part of intestinal tract        Past Medical History:   Diagnosis Date    C. difficile colitis 2023    Diverticulosis     History of dental abscess         Past Surgical History:   Procedure Laterality Date    drain placement  2024    NOSE SURGERY         Social History     Tobacco Use    Smoking status: Former     Current packs/day: 0.00     Types: Cigarettes     Quit date: 2024     Years since quittin.0    Smokeless tobacco: Never   Substance Use Topics    Alcohol use: Not Currently     Comment: 3-4 drinks on the weekends       No family history on file.    REVIEW OF SYSTEMS:     5 point ROS negative except as  noted above in HPI, including Gen., Resp., CV, GI &  system review.      PHYSICAL EXAM:   There were no vitals taken for this visit. Estimated body mass index is 35.26 kg/m  as calculated from the following:    Height as of 2/18/24: 1.829 m (6').    Weight as of 2/18/24: 117.9 kg (260 lb).   All Vitals have been reviewed.    GENERAL APPEARANCE: healthy and alert  MENTAL STATUS: alert  HEENT: NC/AT, normal neck mobility   RESP: lungs clear to auscultation - no rales, rhonchi or wheezes  CV: regular rates and rhythm      IMPRESSION   ASA Class 2 - Mild systemic disease        PLAN:     Plan for colonoscopy. We discussed the risks, benefits and alternatives and the patient wished to proceed.    The above has been forwarded to the consulting provider.      Alejandra Cruz MD  [facility]  Phone: [phone]  Fax: [phone]  April 16, 2024

## 2024-04-17 ENCOUNTER — ANESTHESIA (OUTPATIENT)
Dept: SURGERY | Facility: CLINIC | Age: 69
DRG: 329 | End: 2024-04-17
Payer: COMMERCIAL

## 2024-04-17 ENCOUNTER — HOSPITAL ENCOUNTER (INPATIENT)
Facility: CLINIC | Age: 69
LOS: 2 days | Discharge: HOME | DRG: 329 | End: 2024-04-19
Attending: COLON & RECTAL SURGERY | Admitting: COLON & RECTAL SURGERY
Payer: COMMERCIAL

## 2024-04-17 DIAGNOSIS — K57.20 DIVERTICULITIS OF LARGE INTESTINE WITH PERFORATION AND ABSCESS WITHOUT BLEEDING: Primary | ICD-10-CM

## 2024-04-17 PROBLEM — K57.32 DIVERTICULITIS LARGE INTESTINE: Status: ACTIVE | Noted: 2024-04-17

## 2024-04-17 LAB
ABO/RH(D): NORMAL
ANION GAP SERPL CALCULATED.3IONS-SCNC: 18 MMOL/L (ref 7–15)
ANTIBODY SCREEN: NEGATIVE
BUN SERPL-MCNC: 9.2 MG/DL (ref 8–23)
CALCIUM SERPL-MCNC: 9.3 MG/DL (ref 8.8–10.2)
CHLORIDE SERPL-SCNC: 101 MMOL/L (ref 98–107)
CREAT SERPL-MCNC: 0.79 MG/DL (ref 0.67–1.17)
DEPRECATED HCO3 PLAS-SCNC: 20 MMOL/L (ref 22–29)
EGFRCR SERPLBLD CKD-EPI 2021: >90 ML/MIN/1.73M2
ERYTHROCYTE [DISTWIDTH] IN BLOOD BY AUTOMATED COUNT: 15.7 % (ref 10–15)
GLUCOSE SERPL-MCNC: 122 MG/DL (ref 70–99)
GLUCOSE SERPL-MCNC: 122 MG/DL (ref 70–99)
HCT VFR BLD AUTO: 44.7 % (ref 40–53)
HGB BLD-MCNC: 14.9 G/DL (ref 13.3–17.7)
MCH RBC QN AUTO: 29.9 PG (ref 26.5–33)
MCHC RBC AUTO-ENTMCNC: 33.3 G/DL (ref 31.5–36.5)
MCV RBC AUTO: 90 FL (ref 78–100)
PLATELET # BLD AUTO: 318 10E3/UL (ref 150–450)
POTASSIUM SERPL-SCNC: 3.6 MMOL/L (ref 3.4–5.3)
RBC # BLD AUTO: 4.98 10E6/UL (ref 4.4–5.9)
SODIUM SERPL-SCNC: 139 MMOL/L (ref 135–145)
SPECIMEN EXPIRATION DATE: NORMAL
WBC # BLD AUTO: 11.5 10E3/UL (ref 4–11)

## 2024-04-17 PROCEDURE — 272N000001 HC OR GENERAL SUPPLY STERILE: Performed by: COLON & RECTAL SURGERY

## 2024-04-17 PROCEDURE — 250N000009 HC RX 250: Performed by: REGISTERED NURSE

## 2024-04-17 PROCEDURE — 0DBN0ZZ EXCISION OF SIGMOID COLON, OPEN APPROACH: ICD-10-PCS | Performed by: COLON & RECTAL SURGERY

## 2024-04-17 PROCEDURE — 250N000011 HC RX IP 250 OP 636: Performed by: COLON & RECTAL SURGERY

## 2024-04-17 PROCEDURE — 0DJD8ZZ INSPECTION OF LOWER INTESTINAL TRACT, VIA NATURAL OR ARTIFICIAL OPENING ENDOSCOPIC: ICD-10-PCS | Performed by: COLON & RECTAL SURGERY

## 2024-04-17 PROCEDURE — 258N000003 HC RX IP 258 OP 636: Performed by: ANESTHESIOLOGY

## 2024-04-17 PROCEDURE — 88307 TISSUE EXAM BY PATHOLOGIST: CPT | Mod: TC | Performed by: COLON & RECTAL SURGERY

## 2024-04-17 PROCEDURE — 250N000009 HC RX 250: Performed by: COLON & RECTAL SURGERY

## 2024-04-17 PROCEDURE — 258N000001 HC RX 258: Performed by: COLON & RECTAL SURGERY

## 2024-04-17 PROCEDURE — 44204 LAPARO PARTIAL COLECTOMY: CPT | Performed by: ANESTHESIOLOGY

## 2024-04-17 PROCEDURE — 80048 BASIC METABOLIC PNL TOTAL CA: CPT | Performed by: COLON & RECTAL SURGERY

## 2024-04-17 PROCEDURE — 120N000001 HC R&B MED SURG/OB

## 2024-04-17 PROCEDURE — 82947 ASSAY GLUCOSE BLOOD QUANT: CPT | Performed by: ANESTHESIOLOGY

## 2024-04-17 PROCEDURE — 250N000011 HC RX IP 250 OP 636: Performed by: NURSE ANESTHETIST, CERTIFIED REGISTERED

## 2024-04-17 PROCEDURE — 0DNN0ZZ RELEASE SIGMOID COLON, OPEN APPROACH: ICD-10-PCS | Performed by: COLON & RECTAL SURGERY

## 2024-04-17 PROCEDURE — 250N000011 HC RX IP 250 OP 636: Performed by: ANESTHESIOLOGY

## 2024-04-17 PROCEDURE — 360N000080 HC SURGERY LEVEL 7, PER MIN: Performed by: COLON & RECTAL SURGERY

## 2024-04-17 PROCEDURE — 0DBM0ZZ EXCISION OF DESCENDING COLON, OPEN APPROACH: ICD-10-PCS | Performed by: COLON & RECTAL SURGERY

## 2024-04-17 PROCEDURE — 258N000003 HC RX IP 258 OP 636: Performed by: NURSE ANESTHETIST, CERTIFIED REGISTERED

## 2024-04-17 PROCEDURE — 999N000141 HC STATISTIC PRE-PROCEDURE NURSING ASSESSMENT: Performed by: COLON & RECTAL SURGERY

## 2024-04-17 PROCEDURE — 85018 HEMOGLOBIN: CPT | Performed by: COLON & RECTAL SURGERY

## 2024-04-17 PROCEDURE — 86900 BLOOD TYPING SEROLOGIC ABO: CPT | Performed by: ANESTHESIOLOGY

## 2024-04-17 PROCEDURE — 250N000009 HC RX 250: Performed by: NURSE ANESTHETIST, CERTIFIED REGISTERED

## 2024-04-17 PROCEDURE — 370N000017 HC ANESTHESIA TECHNICAL FEE, PER MIN: Performed by: COLON & RECTAL SURGERY

## 2024-04-17 PROCEDURE — 250N000025 HC SEVOFLURANE, PER MIN: Performed by: COLON & RECTAL SURGERY

## 2024-04-17 PROCEDURE — 8E0W4CZ ROBOTIC ASSISTED PROCEDURE OF TRUNK REGION, PERCUTANEOUS ENDOSCOPIC APPROACH: ICD-10-PCS | Performed by: COLON & RECTAL SURGERY

## 2024-04-17 PROCEDURE — 258N000003 HC RX IP 258 OP 636: Performed by: COLON & RECTAL SURGERY

## 2024-04-17 PROCEDURE — 4A1BXSH MONITORING OF GASTROINTESTINAL VASCULAR PERFUSION USING INDOCYANINE GREEN DYE, EXTERNAL APPROACH: ICD-10-PCS | Performed by: COLON & RECTAL SURGERY

## 2024-04-17 PROCEDURE — P9045 ALBUMIN (HUMAN), 5%, 250 ML: HCPCS | Mod: JZ | Performed by: NURSE ANESTHETIST, CERTIFIED REGISTERED

## 2024-04-17 PROCEDURE — 710N000009 HC RECOVERY PHASE 1, LEVEL 1, PER MIN: Performed by: COLON & RECTAL SURGERY

## 2024-04-17 PROCEDURE — 250N000013 HC RX MED GY IP 250 OP 250 PS 637: Performed by: COLON & RECTAL SURGERY

## 2024-04-17 PROCEDURE — 44204 LAPARO PARTIAL COLECTOMY: CPT | Performed by: NURSE ANESTHETIST, CERTIFIED REGISTERED

## 2024-04-17 RX ORDER — HYDROMORPHONE HYDROCHLORIDE 1 MG/ML
0.4 INJECTION, SOLUTION INTRAMUSCULAR; INTRAVENOUS; SUBCUTANEOUS EVERY 5 MIN PRN
Status: CANCELLED | OUTPATIENT
Start: 2024-04-17

## 2024-04-17 RX ORDER — MEPERIDINE HYDROCHLORIDE 25 MG/ML
12.5 INJECTION INTRAMUSCULAR; INTRAVENOUS; SUBCUTANEOUS EVERY 5 MIN PRN
Status: DISCONTINUED | OUTPATIENT
Start: 2024-04-17 | End: 2024-04-17 | Stop reason: HOSPADM

## 2024-04-17 RX ORDER — NALOXONE HYDROCHLORIDE 0.4 MG/ML
0.4 INJECTION, SOLUTION INTRAMUSCULAR; INTRAVENOUS; SUBCUTANEOUS
Status: DISCONTINUED | OUTPATIENT
Start: 2024-04-17 | End: 2024-04-19 | Stop reason: HOSPADM

## 2024-04-17 RX ORDER — ONDANSETRON 4 MG/1
4 TABLET, ORALLY DISINTEGRATING ORAL EVERY 6 HOURS PRN
Status: DISCONTINUED | OUTPATIENT
Start: 2024-04-17 | End: 2024-04-19 | Stop reason: HOSPADM

## 2024-04-17 RX ORDER — HEPARIN SODIUM 5000 [USP'U]/.5ML
5000 INJECTION, SOLUTION INTRAVENOUS; SUBCUTANEOUS EVERY 8 HOURS
Status: DISCONTINUED | OUTPATIENT
Start: 2024-04-18 | End: 2024-04-19 | Stop reason: HOSPADM

## 2024-04-17 RX ORDER — NALOXONE HYDROCHLORIDE 0.4 MG/ML
0.1 INJECTION, SOLUTION INTRAMUSCULAR; INTRAVENOUS; SUBCUTANEOUS
Status: CANCELLED | OUTPATIENT
Start: 2024-04-17

## 2024-04-17 RX ORDER — OXYCODONE HYDROCHLORIDE 5 MG/1
5 TABLET ORAL EVERY 4 HOURS PRN
Status: DISCONTINUED | OUTPATIENT
Start: 2024-04-17 | End: 2024-04-19 | Stop reason: HOSPADM

## 2024-04-17 RX ORDER — HYDRALAZINE HYDROCHLORIDE 20 MG/ML
10 INJECTION INTRAMUSCULAR; INTRAVENOUS EVERY 4 HOURS PRN
Status: DISCONTINUED | OUTPATIENT
Start: 2024-04-17 | End: 2024-04-19 | Stop reason: HOSPADM

## 2024-04-17 RX ORDER — ONDANSETRON 2 MG/ML
INJECTION INTRAMUSCULAR; INTRAVENOUS PRN
Status: DISCONTINUED | OUTPATIENT
Start: 2024-04-17 | End: 2024-04-17

## 2024-04-17 RX ORDER — HYDROMORPHONE HCL IN WATER/PF 6 MG/30 ML
0.4 PATIENT CONTROLLED ANALGESIA SYRINGE INTRAVENOUS EVERY 5 MIN PRN
Status: DISCONTINUED | OUTPATIENT
Start: 2024-04-17 | End: 2024-04-17 | Stop reason: HOSPADM

## 2024-04-17 RX ORDER — VANCOMYCIN HYDROCHLORIDE 125 MG/1
125 CAPSULE ORAL 2 TIMES DAILY
Status: DISCONTINUED | OUTPATIENT
Start: 2024-04-17 | End: 2024-04-19 | Stop reason: HOSPADM

## 2024-04-17 RX ORDER — CEFAZOLIN SODIUM/WATER 2 G/20 ML
2 SYRINGE (ML) INTRAVENOUS
Status: COMPLETED | OUTPATIENT
Start: 2024-04-17 | End: 2024-04-17

## 2024-04-17 RX ORDER — ONDANSETRON 2 MG/ML
4 INJECTION INTRAMUSCULAR; INTRAVENOUS ONCE
Status: COMPLETED | OUTPATIENT
Start: 2024-04-17 | End: 2024-04-17

## 2024-04-17 RX ORDER — FENTANYL CITRATE 50 UG/ML
50 INJECTION, SOLUTION INTRAMUSCULAR; INTRAVENOUS EVERY 5 MIN PRN
Status: CANCELLED | OUTPATIENT
Start: 2024-04-17

## 2024-04-17 RX ORDER — VECURONIUM BROMIDE 1 MG/ML
INJECTION, POWDER, LYOPHILIZED, FOR SOLUTION INTRAVENOUS PRN
Status: DISCONTINUED | OUTPATIENT
Start: 2024-04-17 | End: 2024-04-17

## 2024-04-17 RX ORDER — CEFAZOLIN SODIUM/WATER 2 G/20 ML
2 SYRINGE (ML) INTRAVENOUS SEE ADMIN INSTRUCTIONS
Status: DISCONTINUED | OUTPATIENT
Start: 2024-04-17 | End: 2024-04-17 | Stop reason: HOSPADM

## 2024-04-17 RX ORDER — HYDROMORPHONE HCL IN WATER/PF 6 MG/30 ML
0.2 PATIENT CONTROLLED ANALGESIA SYRINGE INTRAVENOUS EVERY 5 MIN PRN
Status: DISCONTINUED | OUTPATIENT
Start: 2024-04-17 | End: 2024-04-17 | Stop reason: HOSPADM

## 2024-04-17 RX ORDER — MEPERIDINE HYDROCHLORIDE 25 MG/ML
12.5 INJECTION INTRAMUSCULAR; INTRAVENOUS; SUBCUTANEOUS EVERY 5 MIN PRN
Status: CANCELLED | OUTPATIENT
Start: 2024-04-17

## 2024-04-17 RX ORDER — LABETALOL HYDROCHLORIDE 5 MG/ML
5 INJECTION, SOLUTION INTRAVENOUS
Status: CANCELLED | OUTPATIENT
Start: 2024-04-17

## 2024-04-17 RX ORDER — HYDROMORPHONE HCL IN WATER/PF 6 MG/30 ML
0.4 PATIENT CONTROLLED ANALGESIA SYRINGE INTRAVENOUS
Status: DISCONTINUED | OUTPATIENT
Start: 2024-04-17 | End: 2024-04-19 | Stop reason: HOSPADM

## 2024-04-17 RX ORDER — SODIUM CHLORIDE, SODIUM LACTATE, POTASSIUM CHLORIDE, CALCIUM CHLORIDE 600; 310; 30; 20 MG/100ML; MG/100ML; MG/100ML; MG/100ML
INJECTION, SOLUTION INTRAVENOUS CONTINUOUS
Status: CANCELLED | OUTPATIENT
Start: 2024-04-17

## 2024-04-17 RX ORDER — SIMETHICONE 80 MG
80 TABLET,CHEWABLE ORAL 4 TIMES DAILY PRN
Status: DISCONTINUED | OUTPATIENT
Start: 2024-04-17 | End: 2024-04-19 | Stop reason: HOSPADM

## 2024-04-17 RX ORDER — MAGNESIUM HYDROXIDE 1200 MG/15ML
LIQUID ORAL PRN
Status: DISCONTINUED | OUTPATIENT
Start: 2024-04-17 | End: 2024-04-17 | Stop reason: HOSPADM

## 2024-04-17 RX ORDER — LIDOCAINE HYDROCHLORIDE 20 MG/ML
INJECTION, SOLUTION INFILTRATION; PERINEURAL PRN
Status: DISCONTINUED | OUTPATIENT
Start: 2024-04-17 | End: 2024-04-17

## 2024-04-17 RX ORDER — FENTANYL CITRATE 50 UG/ML
INJECTION, SOLUTION INTRAMUSCULAR; INTRAVENOUS PRN
Status: DISCONTINUED | OUTPATIENT
Start: 2024-04-17 | End: 2024-04-17

## 2024-04-17 RX ORDER — ONDANSETRON 2 MG/ML
4 INJECTION INTRAMUSCULAR; INTRAVENOUS EVERY 30 MIN PRN
Status: DISCONTINUED | OUTPATIENT
Start: 2024-04-17 | End: 2024-04-17 | Stop reason: HOSPADM

## 2024-04-17 RX ORDER — SODIUM CHLORIDE, SODIUM LACTATE, POTASSIUM CHLORIDE, CALCIUM CHLORIDE 600; 310; 30; 20 MG/100ML; MG/100ML; MG/100ML; MG/100ML
INJECTION, SOLUTION INTRAVENOUS CONTINUOUS
Status: DISCONTINUED | OUTPATIENT
Start: 2024-04-17 | End: 2024-04-19

## 2024-04-17 RX ORDER — FENTANYL CITRATE 50 UG/ML
25 INJECTION, SOLUTION INTRAMUSCULAR; INTRAVENOUS EVERY 5 MIN PRN
Status: CANCELLED | OUTPATIENT
Start: 2024-04-17

## 2024-04-17 RX ORDER — NALOXONE HYDROCHLORIDE 0.4 MG/ML
0.2 INJECTION, SOLUTION INTRAMUSCULAR; INTRAVENOUS; SUBCUTANEOUS
Status: DISCONTINUED | OUTPATIENT
Start: 2024-04-17 | End: 2024-04-19 | Stop reason: HOSPADM

## 2024-04-17 RX ORDER — ONDANSETRON 4 MG/1
4 TABLET, ORALLY DISINTEGRATING ORAL EVERY 30 MIN PRN
Status: CANCELLED | OUTPATIENT
Start: 2024-04-17

## 2024-04-17 RX ORDER — CELECOXIB 200 MG/1
200 CAPSULE ORAL DAILY
COMMUNITY
Start: 2024-02-03

## 2024-04-17 RX ORDER — LANOLIN ALCOHOL/MO/W.PET/CERES
3 CREAM (GRAM) TOPICAL
Status: DISCONTINUED | OUTPATIENT
Start: 2024-04-17 | End: 2024-04-19 | Stop reason: HOSPADM

## 2024-04-17 RX ORDER — HYDROMORPHONE HYDROCHLORIDE 1 MG/ML
0.2 INJECTION, SOLUTION INTRAMUSCULAR; INTRAVENOUS; SUBCUTANEOUS EVERY 5 MIN PRN
Status: CANCELLED | OUTPATIENT
Start: 2024-04-17

## 2024-04-17 RX ORDER — ACETAMINOPHEN 325 MG/1
975 TABLET ORAL EVERY 8 HOURS
Status: DISCONTINUED | OUTPATIENT
Start: 2024-04-17 | End: 2024-04-19 | Stop reason: HOSPADM

## 2024-04-17 RX ORDER — LIDOCAINE 40 MG/G
CREAM TOPICAL
Status: DISCONTINUED | OUTPATIENT
Start: 2024-04-17 | End: 2024-04-19 | Stop reason: HOSPADM

## 2024-04-17 RX ORDER — KETOROLAC TROMETHAMINE 15 MG/ML
15 INJECTION, SOLUTION INTRAMUSCULAR; INTRAVENOUS EVERY 6 HOURS PRN
Status: DISCONTINUED | OUTPATIENT
Start: 2024-04-17 | End: 2024-04-19 | Stop reason: HOSPADM

## 2024-04-17 RX ORDER — METRONIDAZOLE 500 MG/100ML
500 INJECTION, SOLUTION INTRAVENOUS
Status: COMPLETED | OUTPATIENT
Start: 2024-04-17 | End: 2024-04-17

## 2024-04-17 RX ORDER — NALOXONE HYDROCHLORIDE 0.4 MG/ML
0.1 INJECTION, SOLUTION INTRAMUSCULAR; INTRAVENOUS; SUBCUTANEOUS
Status: DISCONTINUED | OUTPATIENT
Start: 2024-04-17 | End: 2024-04-17 | Stop reason: HOSPADM

## 2024-04-17 RX ORDER — LORAZEPAM 2 MG/ML
0.5 INJECTION INTRAMUSCULAR EVERY 6 HOURS PRN
Status: DISCONTINUED | OUTPATIENT
Start: 2024-04-17 | End: 2024-04-19 | Stop reason: HOSPADM

## 2024-04-17 RX ORDER — OXYCODONE HYDROCHLORIDE 5 MG/1
10 TABLET ORAL EVERY 4 HOURS PRN
Status: DISCONTINUED | OUTPATIENT
Start: 2024-04-17 | End: 2024-04-19 | Stop reason: HOSPADM

## 2024-04-17 RX ORDER — HYDROMORPHONE HCL IN WATER/PF 6 MG/30 ML
0.2 PATIENT CONTROLLED ANALGESIA SYRINGE INTRAVENOUS
Status: DISCONTINUED | OUTPATIENT
Start: 2024-04-17 | End: 2024-04-19 | Stop reason: HOSPADM

## 2024-04-17 RX ORDER — ACETAMINOPHEN 325 MG/1
650 TABLET ORAL EVERY 4 HOURS PRN
Status: DISCONTINUED | OUTPATIENT
Start: 2024-04-20 | End: 2024-04-19 | Stop reason: HOSPADM

## 2024-04-17 RX ORDER — ONDANSETRON 2 MG/ML
4 INJECTION INTRAMUSCULAR; INTRAVENOUS EVERY 6 HOURS PRN
Status: DISCONTINUED | OUTPATIENT
Start: 2024-04-17 | End: 2024-04-19 | Stop reason: HOSPADM

## 2024-04-17 RX ORDER — ACETAMINOPHEN 325 MG/1
975 TABLET ORAL ONCE
Status: COMPLETED | OUTPATIENT
Start: 2024-04-17 | End: 2024-04-17

## 2024-04-17 RX ORDER — FENTANYL CITRATE 0.05 MG/ML
50 INJECTION, SOLUTION INTRAMUSCULAR; INTRAVENOUS EVERY 5 MIN PRN
Status: DISCONTINUED | OUTPATIENT
Start: 2024-04-17 | End: 2024-04-17 | Stop reason: HOSPADM

## 2024-04-17 RX ORDER — PROPOFOL 10 MG/ML
INJECTION, EMULSION INTRAVENOUS CONTINUOUS PRN
Status: DISCONTINUED | OUTPATIENT
Start: 2024-04-17 | End: 2024-04-17

## 2024-04-17 RX ORDER — MEPERIDINE HYDROCHLORIDE 25 MG/ML
12.5 INJECTION INTRAMUSCULAR; INTRAVENOUS; SUBCUTANEOUS ONCE
Status: COMPLETED | OUTPATIENT
Start: 2024-04-17 | End: 2024-04-17

## 2024-04-17 RX ORDER — DEXAMETHASONE SODIUM PHOSPHATE 4 MG/ML
INJECTION, SOLUTION INTRA-ARTICULAR; INTRALESIONAL; INTRAMUSCULAR; INTRAVENOUS; SOFT TISSUE PRN
Status: DISCONTINUED | OUTPATIENT
Start: 2024-04-17 | End: 2024-04-17

## 2024-04-17 RX ORDER — ONDANSETRON 2 MG/ML
4 INJECTION INTRAMUSCULAR; INTRAVENOUS EVERY 30 MIN PRN
Status: CANCELLED | OUTPATIENT
Start: 2024-04-17

## 2024-04-17 RX ORDER — ONDANSETRON 4 MG/1
4 TABLET, ORALLY DISINTEGRATING ORAL EVERY 30 MIN PRN
Status: DISCONTINUED | OUTPATIENT
Start: 2024-04-17 | End: 2024-04-17 | Stop reason: HOSPADM

## 2024-04-17 RX ORDER — PROPOFOL 10 MG/ML
INJECTION, EMULSION INTRAVENOUS PRN
Status: DISCONTINUED | OUTPATIENT
Start: 2024-04-17 | End: 2024-04-17

## 2024-04-17 RX ORDER — SODIUM CHLORIDE, SODIUM LACTATE, POTASSIUM CHLORIDE, CALCIUM CHLORIDE 600; 310; 30; 20 MG/100ML; MG/100ML; MG/100ML; MG/100ML
INJECTION, SOLUTION INTRAVENOUS CONTINUOUS
Status: DISCONTINUED | OUTPATIENT
Start: 2024-04-17 | End: 2024-04-17 | Stop reason: HOSPADM

## 2024-04-17 RX ORDER — FENTANYL CITRATE 0.05 MG/ML
25 INJECTION, SOLUTION INTRAMUSCULAR; INTRAVENOUS EVERY 5 MIN PRN
Status: DISCONTINUED | OUTPATIENT
Start: 2024-04-17 | End: 2024-04-17 | Stop reason: HOSPADM

## 2024-04-17 RX ORDER — HYDRALAZINE HYDROCHLORIDE 20 MG/ML
2.5-5 INJECTION INTRAMUSCULAR; INTRAVENOUS
Status: CANCELLED | OUTPATIENT
Start: 2024-04-17

## 2024-04-17 RX ADMIN — PHENYLEPHRINE HYDROCHLORIDE 100 MCG: 10 INJECTION INTRAVENOUS at 12:39

## 2024-04-17 RX ADMIN — Medication 2 G: at 16:12

## 2024-04-17 RX ADMIN — ACETAMINOPHEN 975 MG: 325 TABLET, FILM COATED ORAL at 20:07

## 2024-04-17 RX ADMIN — PHENYLEPHRINE HYDROCHLORIDE 100 MCG: 10 INJECTION INTRAVENOUS at 14:05

## 2024-04-17 RX ADMIN — VANCOMYCIN HYDROCHLORIDE 125 MG: 125 CAPSULE ORAL at 21:45

## 2024-04-17 RX ADMIN — SODIUM CHLORIDE, POTASSIUM CHLORIDE, SODIUM LACTATE AND CALCIUM CHLORIDE: 600; 310; 30; 20 INJECTION, SOLUTION INTRAVENOUS at 09:37

## 2024-04-17 RX ADMIN — SODIUM CHLORIDE, POTASSIUM CHLORIDE, SODIUM LACTATE AND CALCIUM CHLORIDE: 600; 310; 30; 20 INJECTION, SOLUTION INTRAVENOUS at 19:55

## 2024-04-17 RX ADMIN — MIDAZOLAM 2 MG: 1 INJECTION INTRAMUSCULAR; INTRAVENOUS at 12:10

## 2024-04-17 RX ADMIN — ROCURONIUM BROMIDE 20 MG: 50 INJECTION, SOLUTION INTRAVENOUS at 13:00

## 2024-04-17 RX ADMIN — FENTANYL CITRATE 100 MCG: 50 INJECTION INTRAMUSCULAR; INTRAVENOUS at 12:17

## 2024-04-17 RX ADMIN — HYDROMORPHONE HYDROCHLORIDE 0.5 MG: 1 INJECTION, SOLUTION INTRAMUSCULAR; INTRAVENOUS; SUBCUTANEOUS at 16:07

## 2024-04-17 RX ADMIN — ONDANSETRON 4 MG: 2 INJECTION INTRAMUSCULAR; INTRAVENOUS at 17:05

## 2024-04-17 RX ADMIN — KETOROLAC TROMETHAMINE 15 MG: 15 INJECTION, SOLUTION INTRAMUSCULAR; INTRAVENOUS at 23:32

## 2024-04-17 RX ADMIN — VECURONIUM BROMIDE 2 MG: 1 INJECTION, POWDER, LYOPHILIZED, FOR SOLUTION INTRAVENOUS at 14:30

## 2024-04-17 RX ADMIN — SUGAMMADEX 200 MG: 100 INJECTION, SOLUTION INTRAVENOUS at 17:39

## 2024-04-17 RX ADMIN — HYDROMORPHONE HYDROCHLORIDE 0.2 MG: 0.2 INJECTION, SOLUTION INTRAMUSCULAR; INTRAVENOUS; SUBCUTANEOUS at 18:53

## 2024-04-17 RX ADMIN — PHENYLEPHRINE HYDROCHLORIDE 100 MCG: 10 INJECTION INTRAVENOUS at 12:35

## 2024-04-17 RX ADMIN — PHENYLEPHRINE HYDROCHLORIDE 200 MCG: 10 INJECTION INTRAVENOUS at 14:09

## 2024-04-17 RX ADMIN — ALBUMIN HUMAN: 0.05 INJECTION, SOLUTION INTRAVENOUS at 16:21

## 2024-04-17 RX ADMIN — ACETAMINOPHEN 975 MG: 325 TABLET, FILM COATED ORAL at 09:09

## 2024-04-17 RX ADMIN — PHENYLEPHRINE HYDROCHLORIDE 0.3 MCG/KG/MIN: 10 INJECTION INTRAVENOUS at 12:33

## 2024-04-17 RX ADMIN — ROCURONIUM BROMIDE 30 MG: 50 INJECTION, SOLUTION INTRAVENOUS at 13:46

## 2024-04-17 RX ADMIN — VECURONIUM BROMIDE 2 MG: 1 INJECTION, POWDER, LYOPHILIZED, FOR SOLUTION INTRAVENOUS at 16:50

## 2024-04-17 RX ADMIN — METRONIDAZOLE 500 MG: 500 INJECTION, SOLUTION INTRAVENOUS at 09:37

## 2024-04-17 RX ADMIN — FENTANYL CITRATE 50 MCG: 50 INJECTION, SOLUTION INTRAMUSCULAR; INTRAVENOUS at 18:22

## 2024-04-17 RX ADMIN — PROPOFOL 200 MG: 10 INJECTION, EMULSION INTRAVENOUS at 12:17

## 2024-04-17 RX ADMIN — SODIUM CHLORIDE, POTASSIUM CHLORIDE, SODIUM LACTATE AND CALCIUM CHLORIDE: 600; 310; 30; 20 INJECTION, SOLUTION INTRAVENOUS at 15:50

## 2024-04-17 RX ADMIN — FENTANYL CITRATE 50 MCG: 50 INJECTION, SOLUTION INTRAMUSCULAR; INTRAVENOUS at 18:02

## 2024-04-17 RX ADMIN — PHENYLEPHRINE HYDROCHLORIDE 100 MCG: 10 INJECTION INTRAVENOUS at 12:42

## 2024-04-17 RX ADMIN — PHENYLEPHRINE HYDROCHLORIDE 200 MCG: 10 INJECTION INTRAVENOUS at 14:12

## 2024-04-17 RX ADMIN — MEPERIDINE HYDROCHLORIDE 12.5 MG: 25 INJECTION INTRAMUSCULAR; INTRAVENOUS; SUBCUTANEOUS at 19:13

## 2024-04-17 RX ADMIN — VECURONIUM BROMIDE 2 MG: 1 INJECTION, POWDER, LYOPHILIZED, FOR SOLUTION INTRAVENOUS at 15:15

## 2024-04-17 RX ADMIN — ROCURONIUM BROMIDE 50 MG: 50 INJECTION, SOLUTION INTRAVENOUS at 12:18

## 2024-04-17 RX ADMIN — KETOROLAC TROMETHAMINE 15 MG: 15 INJECTION, SOLUTION INTRAMUSCULAR; INTRAVENOUS at 18:31

## 2024-04-17 RX ADMIN — LIDOCAINE HYDROCHLORIDE 50 MG: 20 INJECTION, SOLUTION INFILTRATION; PERINEURAL at 12:17

## 2024-04-17 RX ADMIN — VECURONIUM BROMIDE 2 MG: 1 INJECTION, POWDER, LYOPHILIZED, FOR SOLUTION INTRAVENOUS at 16:00

## 2024-04-17 RX ADMIN — PROPOFOL 30 MCG/KG/MIN: 10 INJECTION, EMULSION INTRAVENOUS at 12:21

## 2024-04-17 RX ADMIN — ONDANSETRON 4 MG: 2 INJECTION INTRAMUSCULAR; INTRAVENOUS at 09:35

## 2024-04-17 RX ADMIN — DEXAMETHASONE SODIUM PHOSPHATE 4 MG: 4 INJECTION, SOLUTION INTRA-ARTICULAR; INTRALESIONAL; INTRAMUSCULAR; INTRAVENOUS; SOFT TISSUE at 12:35

## 2024-04-17 RX ADMIN — Medication 2 G: at 12:14

## 2024-04-17 RX ADMIN — HYDROMORPHONE HYDROCHLORIDE 0.2 MG: 0.2 INJECTION, SOLUTION INTRAMUSCULAR; INTRAVENOUS; SUBCUTANEOUS at 21:16

## 2024-04-17 RX ADMIN — ALBUMIN HUMAN: 0.05 INJECTION, SOLUTION INTRAVENOUS at 16:10

## 2024-04-17 ASSESSMENT — LIFESTYLE VARIABLES: TOBACCO_USE: 1

## 2024-04-17 ASSESSMENT — ACTIVITIES OF DAILY LIVING (ADL)
ADLS_ACUITY_SCORE: 37
ADLS_ACUITY_SCORE: 20

## 2024-04-17 NOTE — OP NOTE
PREOPERATIVE DIAGNOSIS: Diverticulitis with pericolonic abscess     POSTOPERATIVE DIAGNOSIS: Same     OPERATION PERFORMED:   1. Robotic converted to open sigmoid and descending colectomy with colorectal anastomosis at 15 cm.   2.  Full mobilization of the splenic flexure   3.  Rigid proctoscopy with anastomotic leak testing      Surgeon:  Alejandra Cruz MD  Assistant: Minesh Ramos PA-C     ANESTHESIA: General.      INDICATION: Castillo Alba is a 68 year old  year old man who has had multiple previous attacks of diverticulitis.  He has a history of a C. difficile infection, and has had multiple uncomplicated episodes of diverticulitis over the past 15 to 20 years.  He was recently admitted to [facility] with acute abdominal pain and was found to have acute, perforated diverticulitis with an approximately 9 cm pericolonic abscess.  He underwent percutaneous drainage and management with antibiotics with significant clinical improvement.  He presents today for definitive management of his diverticular disease. The patient last underwent a colonoscopy yesterday which demonstrated diverticular disease and no evidence of luminal mass. The risks and benefits of a robotic assisted sigmoid colectomy were discussed and the patient agreed to proceed.      OPERATIVE FINDINGS: Diverticular disease in the distal descending and mid sigmoid colon with a significantly thickened mesentery and significant inflammation of the peritoneal attachments as well as the plane between the colonic mesentery and retroperitoneum.  2 small bowel loops were adhesed to the inflamed colon without evidence of a small bowel fistula.  Otherwise normal intra-abdominal anatomy     PROCEDURE IN DETAIL: After informed consent was obtained, the patient was brought to the operating room and placed in the supine position on the operating room table. Sequential compression devices were placed on the lower  extremities prior to induction and general anesthesia was induced without difficulty. The patient was placed into a well-padded dorsal lithotomy position and IV antibiotics were administered. A Pigasi pad was used on the operating room table to prevent him from sliding off the table in steep Trendelenburg position.  The abdomen was sterilely prepped and draped in standard fashion.      The abdomen was entered using a Veress technique in the left upper quadrant. After a positive saline drop test, the abdomen was insufflated with CO2 gas to a pressure of 15 mmHg.  An incision was then made just superior and lateral to the umbilicus in the right abdomen and an 8 mm bladeless trocar was inserted into the abdominal cavity through the port incision.  The abdomen was then explored and the operative findings are noted above.  A laparoscopic tap block consisting of 30 mL of 0.5% Marcaine was then instilled in 4 quadrants.  Three additional robotic ports were placed in a straight line across the abdomen, 12 mm in the right mid-abdomen and two 8 mm trochars in the left abdomen.  A 5 mm air seal was then placed superior to the robotic trochars in between the right and supraumbilical port sites.      The patient was then placed in steep Trendelenburg position and the small bowel was gently swept out of the pelvis using a blunt grasper.  We then docked the robot using a tip up grasper in arm 1, a fenestrated bipolar in arm 2, the camera in arm 3 and monopolar scissors in arm 4.  The distal descending and mid sigmoid colon were significantly inflamed and densely adherent to the lateral sidewall and the sigmoid fossa.  There were also small bowel adhesions to the inflamed colon.  We started by mobilizing the small intestines off of the sigmoid colon.  There was evidence of a pericolonic abscess cavity, but no clear fistula to the small bowel.  We then proceeded with a lateral to medial approach trying to mobilize the descending  colon and sigmoid colon.  This plane was significantly inflamed and fibrotic.  We did enter into multiple small abscess cavities with drainage of a small amount of purulence.  As we are performing this dissection, it was difficult to identify any retroperitoneal anatomy.  We therefore transition to trying to mobilize the upper rectum and distal sigmoid colon as these both appeared soft, pliable and healthy.  The sigmoid colon was then placed on tension towards the anterior abdominal wall.  We were able to open the peritoneum extending from where the WILLIE pedicle likely was located, towards the pelvis.  We were able to enter into the presacral plane, and continued this dissection towards the left lateral sidewall.  Given the significant inflammation of the mid sigmoid colon, we are unable to complete this dissection up to the level of the WILLIE pedicle.  We then transitioned to mobilization from the lateral side, and were able to enter into a normal plane in the distal sigmoid colon and upper rectum connecting this with our previous medial dissection.  Unfortunately, the significantly fibrotic mid sigmoid colon remained densely adherent to the retroperitoneum with difficulty identifying the WILLIE pedicle as well as the retroperitoneal structures including the ureter.  We therefore decided to transition to mobilizing the splenic flexure.    We then flipped the boom of the robot and docked towards the left upper quadrant.  We then mobilized the remaining lateral peritoneal attachments.  We then proceeded with mobilizing the omentum off of the transverse colon entering into the lesser sac.  We continued this dissection around the splenic flexure, dividing a significantly inflamed omentum at the level of the spleen.  Eventually we were able to fully mobilize the lateral attachments, and the omental attachments fully mobilizing the splenic flexure.  With this mobility, we had good reach of our proximal descending colon conduit  to the pelvis for a colorectal anastomosis.     We then created a mesenteric window at the level of the proximal rectum and divided the mesentery clearing the rectum for stapling. A 60 mm robotic stapler green load was used to divide the rectum with a single firing. The rectal stump appeared pink, well perfused and hemostatic. We then identified our proximal transection point.  We performed a mesenteric dissection up to the level of the bowel wall.  We again ensured that this site would each easily reach the rectal stump.  We then had the anesthesia providers inject 4 ml of ICG intravenously for intra-operative fluorescence angiography and confirmed viability of our colon conduit and rectal stump.     Given the significant inflammation and fibrosis along the remaining lateral attachments in the sigmoid fossa, we elected to convert to an open operation at this point.  Prior to conversion to open, we then closed the 12 mm robotic stapler port under direct visualization with #0 Vicryl suture using the [employer] Loco device. a lower midline incision was created, and dissection carried down to the level of the fascia.  The fascia was then opened, and the abdomen entered.  An Ben wound retractor was then placed inside the wound, and a Bookwalter retractor used for the remainder of the case to help with abdominal wall retraction.  Even with direct visualization, it was very difficult to identify a normal plane given the significant fibrosis in the sigmoid fossa.  We therefore elected to approximately transect the colon.  We selected a healthy appearing segment of descending colon, made a window in the mesentery at the level of the bowel wall, and divided the bowel with a single firing of the contour stapler, green load.  We then took the mesentery in a radial fashion, and then continued along the the base of the mesentery towards the sigmoid colon with significantly inflamed mesentery.  We were able to dissect through  some of the fibrosis, but still were unable to clearly identify the left ureter or the WILLIE pedicle.  We then transitioned to transecting the bowel distally as the distal sigmoid colon and upper rectum were normal.  We selected a segment of upper rectum at the level of the sacral promontory, and made a window in the mesentery at this location.  The bowel was again divided with a single firing of the contour stapler, green load.  We then divided the intervening mesentery connecting to the presacral plane that had previously been dissected.  With dissection completed either side of the significantly inflamed sigmoid colon, we were able to carefully divide the mesentery close to the level of the bowel wall without entering into the retroperitoneum.  Unfortunately the left ureter was unable to be identified during this dissection however with our division of the mesentery near the level of the bowel wall, injury of the left ureter would be highly unlikely.  Eventually we were able to complete our mesenteric dissection and remove our specimen from the field.  The retroperitoneum did appear intact after removal of the specimen.  Hemostasis was then ensured.    We then inspected our colon conduit.  Our site of transection and the distal descending colon did appear to be within a segment of inflamed mesentery, so we elected to remove an additional 3 cm of colon to reach an area of more healthy and pliable tissue.  A window was made in the level of the bowel wall in the mesentery, and the remaining mesentery was divided using the LigaSure device. The colon was then divided between 2 bowel clamps. The specimen was then handed off for permanent pathology.  The proximal end was then opened and a pursestring suture was created using a 2-0 double-armed prolene suture.  This was tightened around the anvil of the 29 mm EEA stapler. Any additional fat caught in the pursestring suture was trimmed off and the colon was pink and viable  with excellent blood supply.  A second pursestring was made with the same suture and this was tied snugly around the anvil.  The colon conduit comfortably reach to the level of the rectal stump and appeared well-perfused.     VJ Zhu then went below and inserted the stapling instrument into the anal canal, advancing the spike through the staple line of the rectal stump. The two ends of the stapler were connected, the orientation of the colon conduit confirmed and the stapler tightened and fired. Two anastomotic donuts were completely intact. The anastomosis was tested by insufflating from below through the proctoscope with the anastomosis under saline. There was no evidence of leak despite multiple insufflations. The anastomosis was under no tension and there was excellent blood supply on both sides of the anastomosis. Excess air was evacuated from below through the proctoscope.       The abdomen was irrigated with a copious amount of saline solution. The abdomen was suctioned dry. Hemostasis was evident.  We then proceeded with a clean closure technique.  He did have a significantly fatty and robust peritoneum, so this was closed with a 2-0 running Vicryl suture.  The fascia was closed with 2 #0 looped PDS sutures from either end.  All wounds were irrigated with a copious amount of saline solution and the skin was closed with 4-0 Monocryl sutures the skin dressed with skin glue.  The percutaneous drain he had at the start of the case was removed, and a clean dressing placed over the wound.     This operation cannot have been safely performed without compromising the technical results of the procedure without a skilled surgical assistant.  The surgical assistant was necessary for positioning, intraoperative retraction, as well as management of the robotic instruments at bedside.  They were also necessary for creation of the colorectal anastomosis and meticulous wound closure. They ensured that the  procedure could be completed in a technically safe and efficient manner.    The patient tolerated this procedure well without complications.  At the end of the procedure, all sponge needle and instrument counts were correct.  The patient was extubated in the operating room and brought to the recovery room in stable condition.      EBL: 200 ml  SPECIMEN: sigmoid and descending colon  COMPLICATIONS: none

## 2024-04-17 NOTE — ANESTHESIA POSTPROCEDURE EVALUATION
Patient: Catsillo Alba    Procedure: Procedure(s):  Robotic assisted sigmoid colectomy, mobilization of splenic flexor       Anesthesia Type:  General    Note:  Disposition: Inpatient   Postop Pain Control: Uneventful            Sign Out: Well controlled pain   PONV: No   Neuro/Psych: Uneventful            Sign Out: Acceptable/Baseline neuro status   Airway/Respiratory: Uneventful            Sign Out: Acceptable/Baseline resp. status   CV/Hemodynamics: Uneventful            Sign Out: Acceptable CV status; No obvious hypovolemia; No obvious fluid overload   Other NRE:    DID A NON-ROUTINE EVENT OCCUR?            Last vitals:  Vitals Value Taken Time   /82 04/17/24 1845   Temp 36.1  C (96.9  F) 04/17/24 1815   Pulse 75 04/17/24 1848   Resp 14 04/17/24 1848   SpO2 95 % 04/17/24 1848   Vitals shown include unfiled device data.    Electronically Signed By: Alejandra Thrasher MD  April 17, 2024  6:50 PM

## 2024-04-17 NOTE — ANESTHESIA PROCEDURE NOTES
Airway       Patient location during procedure: OR       Procedure Start/Stop Times: 4/17/2024 12:21 PM  Staff -        Performed By: CRNAIndications and Patient Condition       Indications for airway management: karen-procedural and airway protection       Induction type:intravenous       Mask difficulty assessment: 1 - vent by mask    Final Airway Details       Final airway type: endotracheal airway       Successful airway: ETT - single  Endotracheal Airway Details        ETT size (mm): 8.0       Cuffed: yes       Successful intubation technique: direct laryngoscopy       Grade View of Cords: 2       Adjucts: stylet       Position: Right       Measured from: lips       Secured at (cm): 24       Bite block used: None    Post intubation assessment        Placement verified by: capnometry, equal breath sounds and chest rise        Number of attempts at approach: 1       Number of other approaches attempted: 0       Secured with: tape       Ease of procedure: easy       Dentition: Unchanged    Medication(s) Administered   Medication Administration Time: 4/17/2024 12:21 PM

## 2024-04-17 NOTE — ANESTHESIA CARE TRANSFER NOTE
Patient: Castillo Alba    Procedure: Procedure(s):  Robotic assisted sigmoid colectomy, mobilization of splenic flexor       Diagnosis: Diverticulitis [K57.92]  Diagnosis Additional Information: No value filed.    Anesthesia Type:   General     Note:    Oropharynx: oropharynx clear of all foreign objects and spontaneously breathing  Level of Consciousness: drowsy  Oxygen Supplementation: face mask  Level of Supplemental Oxygen (L/min / FiO2): 6  Independent Airway: airway patency satisfactory and stable  Dentition: dentition unchanged  Vital Signs Stable: post-procedure vital signs reviewed and stable    Patient transferred to: PACU  Comments: Patient comfortable  Handoff Report: Identifed the Patient, Identified the Reponsible Provider, Reviewed the pertinent medical history, Discussed the surgical course, Reviewed Intra-OP anesthesia mangement and issues during anesthesia, Set expectations for post-procedure period and Allowed opportunity for questions and acknowledgement of understanding      Vitals:  Vitals Value Taken Time   /71 04/17/24 1752   Temp 36.1  C (96.9  F) 04/17/24 1752   Pulse 72 04/17/24 1757   Resp 18 04/17/24 1757   SpO2 98 % 04/17/24 1757   Vitals shown include unfiled device data.    Electronically Signed By: VENANCIO Wu CRNA  April 17, 2024  5:58 PM

## 2024-04-17 NOTE — ANESTHESIA PREPROCEDURE EVALUATION
Anesthesia Pre-Procedure Evaluation    Patient: Castillo Alba   MRN: [id number] : 1955        Procedure : Procedure(s):  Robotic assisted sigmoid colectomy, possible small bowel resection          Past Medical History:   Diagnosis Date    C. difficile colitis 2023    Diverticulosis     History of dental abscess     Sinusitis, chronic     trouble breathing through nose      Past Surgical History:   Procedure Laterality Date    COLONOSCOPY N/A 2024    Procedure: Colonoscopy;  Surgeon: Alejandra Cruz MD;  Location:  GI    drain placement  2024    NOSE SURGERY        No Known Allergies   Social History     Tobacco Use    Smoking status: Former     Current packs/day: 0.00     Types: Cigarettes     Quit date: 2024     Years since quittin.0    Smokeless tobacco: Never   Substance Use Topics    Alcohol use: Not Currently      Wt Readings from Last 1 Encounters:   24 111.1 kg (245 lb)        Anesthesia Evaluation            ROS/MED HX  ENT/Pulmonary:     (+)                tobacco use, Past use,                       Neurologic:       Cardiovascular:     (+)  - -   -  - -                                 Previous cardiac testing   Echo: Date: Results:    Stress Test:  Date: Results:    ECG Reviewed:  Date:  Results:  NSR  Cath:  Date: Results:      METS/Exercise Tolerance:     Hematologic:       Musculoskeletal:       GI/Hepatic:       Renal/Genitourinary:       Endo:     (+)               Obesity,       Psychiatric/Substance Use:       Infectious Disease:       Malignancy:       Other:            Physical Exam    Airway        Mallampati: II   TM distance: > 3 FB   Neck ROM: full   Mouth opening: > 3 cm    Respiratory Devices and Support         Dental  no notable dental history     (+) Minor Abnormalities - some fillings, tiny chips      Cardiovascular          Rhythm and rate: regular     Pulmonary           breath sounds clear to auscultation           OUTSIDE  "LABS:  CBC:   Lab Results   Component Value Date    WBC 6.1 02/23/2024    WBC 10.8 02/20/2024    HGB 12.7 (L) 02/23/2024    HGB 11.8 (L) 02/20/2024    HCT 38.3 (L) 02/23/2024    HCT 34.7 (L) 02/20/2024     (H) 02/23/2024     02/20/2024     BMP:   Lab Results   Component Value Date     02/23/2024     02/20/2024    POTASSIUM 3.5 02/25/2024    POTASSIUM 3.6 02/24/2024    CHLORIDE 99 02/23/2024    CHLORIDE 103 02/20/2024    CO2 28 02/23/2024    CO2 24 02/20/2024    BUN 5.7 (L) 02/23/2024    BUN 9.6 02/20/2024    CR 0.67 02/23/2024    CR 0.67 02/20/2024     (H) 02/23/2024     (H) 02/20/2024     COAGS: No results found for: \"PTT\", \"INR\", \"FIBR\"  POC: No results found for: \"BGM\", \"HCG\", \"HCGS\"  HEPATIC:   Lab Results   Component Value Date    ALBUMIN 3.1 (L) 02/18/2024    PROTTOTAL 7.2 02/18/2024    ALT 30 02/18/2024    AST 29 02/18/2024    ALKPHOS 146 02/18/2024    BILITOTAL 0.6 02/18/2024     OTHER:   Lab Results   Component Value Date    LACT 1.2 02/18/2024    LENORE 8.6 (L) 02/23/2024       Anesthesia Plan    ASA Status:  2       Anesthesia Type: General.     - Airway: ETT   Induction: Intravenous, Propofol.   Maintenance: Balanced.        Consents    Anesthesia Plan(s) and associated risks, benefits, and realistic alternatives discussed. Questions answered and patient/representative(s) expressed understanding.     - Discussed:     - Discussed with:  Patient            Postoperative Care    Pain management: Multi-modal analgesia.   PONV prophylaxis: Ondansetron (or other 5HT-3), Dexamethasone or Solumedrol, Background Propofol Infusion     Comments:               Julio Owens MD    I have reviewed the pertinent notes and labs in the chart from the past 30 days and (re)examined the patient.  Any updates or changes from those notes are reflected in this note.              # Obesity: Estimated body mass index is 33.23 kg/m  as calculated from the following:    Height as of " 4/16/24: 1.829 m (6').    Weight as of 4/16/24: 111.1 kg (245 lb).

## 2024-04-18 LAB
ANION GAP SERPL CALCULATED.3IONS-SCNC: 11 MMOL/L (ref 7–15)
BUN SERPL-MCNC: 15.3 MG/DL (ref 8–23)
CALCIUM SERPL-MCNC: 8.2 MG/DL (ref 8.8–10.2)
CHLORIDE SERPL-SCNC: 104 MMOL/L (ref 98–107)
CREAT SERPL-MCNC: 0.78 MG/DL (ref 0.67–1.17)
CREAT SERPL-MCNC: 0.88 MG/DL (ref 0.67–1.17)
DEPRECATED HCO3 PLAS-SCNC: 22 MMOL/L (ref 22–29)
EGFRCR SERPLBLD CKD-EPI 2021: >90 ML/MIN/1.73M2
EGFRCR SERPLBLD CKD-EPI 2021: >90 ML/MIN/1.73M2
GLUCOSE SERPL-MCNC: 132 MG/DL (ref 70–99)
HGB BLD-MCNC: 10.3 G/DL (ref 13.3–17.7)
HGB BLD-MCNC: 11.2 G/DL (ref 13.3–17.7)
MAGNESIUM SERPL-MCNC: 1.9 MG/DL (ref 1.7–2.3)
PHOSPHATE SERPL-MCNC: 3.5 MG/DL (ref 2.5–4.5)
PLATELET # BLD AUTO: 251 10E3/UL (ref 150–450)
POTASSIUM SERPL-SCNC: 3.9 MMOL/L (ref 3.4–5.3)
SODIUM SERPL-SCNC: 137 MMOL/L (ref 135–145)

## 2024-04-18 PROCEDURE — 258N000003 HC RX IP 258 OP 636: Performed by: COLON & RECTAL SURGERY

## 2024-04-18 PROCEDURE — 85018 HEMOGLOBIN: CPT | Performed by: PHYSICIAN ASSISTANT

## 2024-04-18 PROCEDURE — 85049 AUTOMATED PLATELET COUNT: CPT | Performed by: COLON & RECTAL SURGERY

## 2024-04-18 PROCEDURE — 250N000011 HC RX IP 250 OP 636: Performed by: COLON & RECTAL SURGERY

## 2024-04-18 PROCEDURE — 36415 COLL VENOUS BLD VENIPUNCTURE: CPT | Performed by: PHYSICIAN ASSISTANT

## 2024-04-18 PROCEDURE — 80048 BASIC METABOLIC PNL TOTAL CA: CPT | Performed by: COLON & RECTAL SURGERY

## 2024-04-18 PROCEDURE — 83735 ASSAY OF MAGNESIUM: CPT | Performed by: COLON & RECTAL SURGERY

## 2024-04-18 PROCEDURE — 250N000013 HC RX MED GY IP 250 OP 250 PS 637: Performed by: COLON & RECTAL SURGERY

## 2024-04-18 PROCEDURE — 120N000001 HC R&B MED SURG/OB

## 2024-04-18 PROCEDURE — 84100 ASSAY OF PHOSPHORUS: CPT | Performed by: COLON & RECTAL SURGERY

## 2024-04-18 PROCEDURE — 85018 HEMOGLOBIN: CPT | Performed by: COLON & RECTAL SURGERY

## 2024-04-18 PROCEDURE — 36415 COLL VENOUS BLD VENIPUNCTURE: CPT | Performed by: COLON & RECTAL SURGERY

## 2024-04-18 PROCEDURE — 82565 ASSAY OF CREATININE: CPT | Performed by: PHYSICIAN ASSISTANT

## 2024-04-18 RX ADMIN — HYDROMORPHONE HYDROCHLORIDE 0.4 MG: 0.2 INJECTION, SOLUTION INTRAMUSCULAR; INTRAVENOUS; SUBCUTANEOUS at 00:33

## 2024-04-18 RX ADMIN — VANCOMYCIN HYDROCHLORIDE 125 MG: 125 CAPSULE ORAL at 09:39

## 2024-04-18 RX ADMIN — SODIUM CHLORIDE, POTASSIUM CHLORIDE, SODIUM LACTATE AND CALCIUM CHLORIDE 500 ML: 600; 310; 30; 20 INJECTION, SOLUTION INTRAVENOUS at 07:29

## 2024-04-18 RX ADMIN — HYDROMORPHONE HYDROCHLORIDE 0.4 MG: 0.2 INJECTION, SOLUTION INTRAMUSCULAR; INTRAVENOUS; SUBCUTANEOUS at 18:30

## 2024-04-18 RX ADMIN — ACETAMINOPHEN 975 MG: 325 TABLET, FILM COATED ORAL at 20:56

## 2024-04-18 RX ADMIN — ACETAMINOPHEN 975 MG: 325 TABLET, FILM COATED ORAL at 12:35

## 2024-04-18 RX ADMIN — OXYCODONE HYDROCHLORIDE 5 MG: 5 TABLET ORAL at 16:33

## 2024-04-18 RX ADMIN — ACETAMINOPHEN 975 MG: 325 TABLET, FILM COATED ORAL at 05:33

## 2024-04-18 RX ADMIN — SODIUM CHLORIDE, POTASSIUM CHLORIDE, SODIUM LACTATE AND CALCIUM CHLORIDE 500 ML: 600; 310; 30; 20 INJECTION, SOLUTION INTRAVENOUS at 18:14

## 2024-04-18 RX ADMIN — OXYCODONE HYDROCHLORIDE 10 MG: 5 TABLET ORAL at 20:56

## 2024-04-18 RX ADMIN — OXYCODONE HYDROCHLORIDE 5 MG: 5 TABLET ORAL at 12:35

## 2024-04-18 RX ADMIN — VANCOMYCIN HYDROCHLORIDE 125 MG: 125 CAPSULE ORAL at 20:56

## 2024-04-18 RX ADMIN — HEPARIN SODIUM 5000 UNITS: 5000 INJECTION, SOLUTION INTRAVENOUS; SUBCUTANEOUS at 22:05

## 2024-04-18 ASSESSMENT — ACTIVITIES OF DAILY LIVING (ADL)
ADLS_ACUITY_SCORE: 23
ADLS_ACUITY_SCORE: 20
ADLS_ACUITY_SCORE: 23
ADLS_ACUITY_SCORE: 20
ADLS_ACUITY_SCORE: 23
ADLS_ACUITY_SCORE: 20
ADLS_ACUITY_SCORE: 20
ADLS_ACUITY_SCORE: 23
ADLS_ACUITY_SCORE: 20

## 2024-04-18 NOTE — PHARMACY-ADMISSION MEDICATION HISTORY
Pharmacist Admission Medication History    Admission medication history is complete. The information provided in this note is only as accurate as the sources available at the time of the update.    Information Source(s): Family member via phone    Pertinent Information: None    Changes made to PTA medication list:  Added: None  Deleted: None  Changed: None    Allergies reviewed with patient and updates made in EHR: no    Medication History Completed By: Laura Tavarez RPH 4/17/2024 8:21 PM    PTA Med List   Medication Sig Note Last Dose    acetaminophen (TYLENOL) 325 MG tablet Take 2 tablets (650 mg) by mouth every 6 hours as needed for mild pain  Past Week    celecoxib (CELEBREX) 200 MG capsule Take 200 mg by mouth daily 4/17/2024: Pt has not started yet More than a month    metroNIDAZOLE (FLAGYL) 500 MG tablet Take 500 mg by mouth 3 times daily Before surgery  4/16/2024    vancomycin (VANCOCIN) 125 MG capsule Take 125 mg by mouth 2 times daily Started on 4/12/2024 4/16/2024

## 2024-04-18 NOTE — PROVIDER NOTIFICATION
..MD Notification    Notified Person: JOHNSON Doherty    Notified Person Name: Minesh Doherty     Notification Date/Time: 4/18 1518 2nd call @ 1540    Notification Interaction: [employer] messaging     Purpose of Notification: Notify of urine output of 75Ml over the last 4 hours    Orders Received: increase LR to 100cc/hr, irrigate catheter     Comments:    Spoke with dr Dain Merlin. Updated on bili result of 6.1. Reviewed 24 hour BG of 60 and CCHD results. Asked about discharge home due to mother asking today. If discharged tonight, baby would have to come back tomorrow for outpatient bili redraw by 0800. [facility] wants mother encouraged to supplement after breastfeeding to help bring down bili level. Will notify mother of all information.

## 2024-04-18 NOTE — PROGRESS NOTES
COLON & RECTAL SURGERY  PROGRESS NOTE    April 18, 2024  Post-op Day # 1    SUBJECTIVE:  Doing well. Some abdominal pain per incisions but overall well controlled. One episode of non--bloody diarrhea this am. No flatus, nausea or vomiting. Has not tried eating yet. AVSS. UOP 585ml. Hgb 11.2 (down from 14.9 yesterday pre-op). Cr 0.78.    OBJECTIVE:  Temp:  [96.9  F (36.1  C)-98.3  F (36.8  C)] 97.8  F (36.6  C)  Pulse:  [66-83] 67  Resp:  [10-23] 16  BP: (110-158)/(57-86) 110/57  SpO2:  [91 %-98 %] 95 %    Intake/Output Summary (Last 24 hours) at 4/18/2024 0855  Last data filed at 4/18/2024 0553  Gross per 24 hour   Intake 2600 ml   Output 785 ml   Net 1815 ml       GENERAL:  Awake, alert, no acute distress, lying in bed  HEAD: Normocephalic atraumatic  SCLERA: Anicteric  EXTREMITIES: Warm and well perfused  ABDOMEN:  Soft, appropriately tender per incisions, non-distended. No guarding, rigidity, or peritoneal signs.   INCISION:  6 lap sites and 1 midline incision C/d/i    LABS:  Lab Results   Component Value Date    WBC 11.5 04/17/2024    WBC 6.6 10/17/2019     Lab Results   Component Value Date    HGB 11.2 04/18/2024    HGB 17.2 10/17/2019     Lab Results   Component Value Date    HCT 44.7 04/17/2024    HCT 49.0 10/17/2019     Lab Results   Component Value Date     04/18/2024     10/17/2019     Last Basic Metabolic Panel:  Lab Results   Component Value Date     04/18/2024     10/17/2019      Lab Results   Component Value Date    POTASSIUM 3.9 04/18/2024    POTASSIUM 3.8 10/17/2019     Lab Results   Component Value Date    CHLORIDE 104 04/18/2024    CHLORIDE 106 10/17/2019     Lab Results   Component Value Date    LENORE 8.2 04/18/2024    LENORE 9.3 10/17/2019     Lab Results   Component Value Date    CO2 22 04/18/2024    CO2 26 10/17/2019     Lab Results   Component Value Date    BUN 15.3 04/18/2024    BUN 17 10/17/2019     Lab Results   Component Value Date    CR 0.78 04/18/2024    CR 0.84  10/17/2019     Lab Results   Component Value Date     04/18/2024     10/17/2019       ASSESSMENT/PLAN: Kuldeep Alba is a 68yoM POD#1 robotic assisted sigmoid colectomy. He is recovering as expected and pain is well controlled. As he has not eaten yet, plan to continue full liquids today to see how he tolerates. Hgb decrease likely dilutional but recheck in am. Keep austin and IVF for one more day, remove tomorrow if UOP improves.    - Full liquid diet  - PRN pain meds  -Recheck Hgb in am.  -Monitor I&O's  - Continue IVF until adequate po intake and UOP improves  - Austin. Remove tomorrow if UOP improves  - OOB, encouraged ambulation  - SQ for ppx    Discussed with Dr. Cruz    For questions/paging, please contact the CRS office at [phone].    Danielle Ramos PA-C  Colorectal Physician Assistant    [facility]  [address] [address]  T: [phone]  F: [phone]

## 2024-04-18 NOTE — PROGRESS NOTES
"Per Danielle Ramos PA-C note on 4/18 \"Keep austin and IVF for one more day, remove tomorrow if UOP improves\"    Did provider choose to remove indwelling austin catheter? No    Provider's austin indication for keeping indwelling austin catheter: /GI/GYN Pelvic Procedure .    Is there an order for indwelling austin catheter? Yes    *If there is a plan to keep austin catheter in place at discharge daily notification with provider is not necessary, but please add a notation in the treatment team sticky note that the patient will be discharging with the catheter.    "

## 2024-04-18 NOTE — PLAN OF CARE
Date & Time: 04/18/24 0617-4076  Surgery/POD#: POD 1 from a Robotic assisted sigmoid colectomy, mobilization of splenic flexor   Behavior & Aggression: green  Fall Risk: Yes  Orientation:A+Ox4  ABNL VS/O2:VSS  ABNL Labs: Ca 8.2, Hemoglobin 10.3  Pain Management:PRN oxycodone and IV dilaudid   Bowel/Bladder: Continent  Drains: Zamora and R PIV  Wounds/incisions: Abdominal lap sites  Diet:Full liquid  Activity Level: SBA  Tests/Procedures: None today  Anticipated  DC Date: Pending improvement  Significant Information: LR running @ 100mL/hr; low urine output @ 1500 PA ordered fluid bolus this am and afternoon. Urine output improved at end of shift      Plan of Care Reviewed With: patient

## 2024-04-18 NOTE — PLAN OF CARE
Goal Outcome Evaluation:    Date & Time: 4/17/24 4682-2054   Surgery/POD#: 1 Robotic assisted sigmoid colectomy, mobilization of splenic flexor  Behavior & Aggression: Green   Fall Risk: Yes   Orientation:A&ox4   ABNL VS/O2:VSS on RA   ABNL Labs: None   Pain Management: PRN diluadid x2, Toradol x1   Bowel/Bladder: Continent of bowel, Zamora in place   Drains: PIV infusing LR @ 75ml/hr, Zamora.   Wounds/incisions 6 lap sites, 1 midline incision   Diet: Full-liquid diet   Activity Level: Ax1 w. GB. Up  to side of bed   Tests/Procedures: None   Anticipated  DC Date: Pending   Significant Information: 6 lap sites open to air with liquid bandage, 1 midline incision.  Abdominal binder in place. Abdomen tender. BS audibe. Not passing gas. Zamora in place with small output(175ml) overnight.

## 2024-04-19 VITALS
WEIGHT: 242 LBS | BODY MASS INDEX: 32.78 KG/M2 | SYSTOLIC BLOOD PRESSURE: 102 MMHG | OXYGEN SATURATION: 90 % | TEMPERATURE: 98.4 F | RESPIRATION RATE: 16 BRPM | HEART RATE: 71 BPM | HEIGHT: 72 IN | DIASTOLIC BLOOD PRESSURE: 51 MMHG

## 2024-04-19 LAB
ANION GAP SERPL CALCULATED.3IONS-SCNC: 7 MMOL/L (ref 7–15)
BUN SERPL-MCNC: 7.1 MG/DL (ref 8–23)
CALCIUM SERPL-MCNC: 8.4 MG/DL (ref 8.8–10.2)
CHLORIDE SERPL-SCNC: 105 MMOL/L (ref 98–107)
CREAT SERPL-MCNC: 0.68 MG/DL (ref 0.67–1.17)
DEPRECATED HCO3 PLAS-SCNC: 28 MMOL/L (ref 22–29)
EGFRCR SERPLBLD CKD-EPI 2021: >90 ML/MIN/1.73M2
GLUCOSE BLDC GLUCOMTR-MCNC: 116 MG/DL (ref 70–99)
GLUCOSE SERPL-MCNC: 122 MG/DL (ref 70–99)
HGB BLD-MCNC: 9.9 G/DL (ref 13.3–17.7)
PATH REPORT.COMMENTS IMP SPEC: NORMAL
PATH REPORT.COMMENTS IMP SPEC: NORMAL
PATH REPORT.FINAL DX SPEC: NORMAL
PATH REPORT.GROSS SPEC: NORMAL
PATH REPORT.MICROSCOPIC SPEC OTHER STN: NORMAL
PATH REPORT.RELEVANT HX SPEC: NORMAL
PHOTO IMAGE: NORMAL
POTASSIUM SERPL-SCNC: 3.7 MMOL/L (ref 3.4–5.3)
SODIUM SERPL-SCNC: 140 MMOL/L (ref 135–145)

## 2024-04-19 PROCEDURE — 250N000013 HC RX MED GY IP 250 OP 250 PS 637: Performed by: COLON & RECTAL SURGERY

## 2024-04-19 PROCEDURE — 250N000011 HC RX IP 250 OP 636: Performed by: COLON & RECTAL SURGERY

## 2024-04-19 PROCEDURE — 36415 COLL VENOUS BLD VENIPUNCTURE: CPT

## 2024-04-19 PROCEDURE — 80048 BASIC METABOLIC PNL TOTAL CA: CPT | Performed by: COLON & RECTAL SURGERY

## 2024-04-19 PROCEDURE — 88307 TISSUE EXAM BY PATHOLOGIST: CPT | Mod: 26 | Performed by: PATHOLOGY

## 2024-04-19 PROCEDURE — 85018 HEMOGLOBIN: CPT

## 2024-04-19 RX ORDER — OXYCODONE HYDROCHLORIDE 5 MG/1
5 TABLET ORAL EVERY 6 HOURS PRN
Qty: 18 TABLET | Refills: 0 | Status: ON HOLD | OUTPATIENT
Start: 2024-04-19 | End: 2024-05-07

## 2024-04-19 RX ADMIN — VANCOMYCIN HYDROCHLORIDE 125 MG: 125 CAPSULE ORAL at 10:11

## 2024-04-19 RX ADMIN — HEPARIN SODIUM 5000 UNITS: 5000 INJECTION, SOLUTION INTRAVENOUS; SUBCUTANEOUS at 14:15

## 2024-04-19 RX ADMIN — ONDANSETRON 4 MG: 4 TABLET, ORALLY DISINTEGRATING ORAL at 06:04

## 2024-04-19 RX ADMIN — ACETAMINOPHEN 975 MG: 325 TABLET, FILM COATED ORAL at 04:16

## 2024-04-19 RX ADMIN — OXYCODONE HYDROCHLORIDE 10 MG: 5 TABLET ORAL at 12:28

## 2024-04-19 RX ADMIN — HEPARIN SODIUM 5000 UNITS: 5000 INJECTION, SOLUTION INTRAVENOUS; SUBCUTANEOUS at 06:04

## 2024-04-19 RX ADMIN — OXYCODONE HYDROCHLORIDE 10 MG: 5 TABLET ORAL at 06:11

## 2024-04-19 RX ADMIN — ACETAMINOPHEN 975 MG: 325 TABLET, FILM COATED ORAL at 14:14

## 2024-04-19 ASSESSMENT — ACTIVITIES OF DAILY LIVING (ADL)
ADLS_ACUITY_SCORE: 25

## 2024-04-19 NOTE — PLAN OF CARE
Date & Time: 04/19/24    [id number]  Surgery/POD#: POD 2 from a Robotic assisted sigmoid colectomy, mobilization of splenic flexor   Behavior & Aggression: green  Fall Risk: Yes  Orientation:A+Ox4  ABNL VS/O2:VSS on RA  ABNL Labs: Ca 8.2, Hbg10.3  Pain Management: Oxycodone x1  Bowel/Bladder: BMx1 this shift. Austin in place with adequate output.   Drains: Austin and R PIV   Wounds/incisions: Abdominal lap sites  Diet:Full liquid  Activity Level: SBA  Anticipated  DC Date: Pending improvement  Significant Information: IV SL. Nausea, Zofran given x1. Recheck Hbg AM. Remove austin AM 4/19/2024.

## 2024-04-19 NOTE — DISCHARGE SUMMARY
[facility] Discharge Summary      Castillo Alba MRN# [id number]   Age: 68 year old YOB: 1955     Date of Admission:  4/17/2024  Date of Discharge::  4/19/2024  Admitting Physician:  Alejandra Cruz MD  Discharge Physician:  Alejandra Cruz MD     PCP:  Martín Morley    Disposition: Patient discharged from [facility] to home in improving condition.        Primary Diagnosis:   Diverticulitis             Discharge Medications:     Current Discharge Medication List      START taking these medications    Details   oxyCODONE (ROXICODONE) 5 MG tablet Take 1 tablet (5 mg) by mouth every 6 hours as needed for moderate to severe pain  Qty: 18 tablet, Refills: 0    Associated Diagnoses: Diverticulitis of large intestine with perforation and abscess without bleeding         CONTINUE these medications which have NOT CHANGED    Details   acetaminophen (TYLENOL) 325 MG tablet Take 2 tablets (650 mg) by mouth every 6 hours as needed for mild pain  Qty: 100 tablet, Refills: 0    Associated Diagnoses: Diverticulitis of intestine with abscess and bleeding, unspecified part of intestinal tract      celecoxib (CELEBREX) 200 MG capsule Take 200 mg by mouth daily      vancomycin (VANCOCIN) 125 MG capsule Take 125 mg by mouth 2 times daily Started on 4/12/2024         STOP taking these medications       metroNIDAZOLE (FLAGYL) 500 MG tablet Comments:   Reason for Stopping:                      Follow Up, Special Instructions:     Discharge diet: Regular   Discharge activity: No heavy lifting, pushing, pulling for 6 week(s)   Discharge follow-up: Follow up with me in 4-6 weeks   Wound care: Keep wound clean and dry              Procedures:     Procedure(s): Robotic assisted sigmoid colectomy        No other procedures performed during this admission            Consultations:   None          Brief Hospital Summary:     Castillo Alba is a 68 year old man who underwent the above  procedure on 4/17 by Dr. Cruz.   There were no immediate complications during this procedure.    Please refer to the full operative summary for details.  The patient's hospital course was unremarkable.  Pain was controlled on oral pain regimen.  He was tolerating a low fiber diet.  Bowel function had returned prior to discharge.  He recovered as anticipated and experienced no post-operative complications.     Physical Exam:    Temp: 98.4  F (36.9  C) Temp src: Oral BP: 102/51 Pulse: 71   Resp: 16 SpO2: 90 % O2 Device: None (Room air)       General - Awake alert and oriented, appears stated age  Pulm - Non-labored breathing with normal respiratory effort  CVS - reg rate and rhythm, no peripheral edema  Abd - soft, appropriately tender, ND, incisions c/d/i  Neuro - CN II-XII grossly intact  Musculoskeletal - extremities with no clubbing, cyanosis or edema; able to ambulate  Psych - responsive, alert, cooperative; oriented x3; appropriate mood and affect  External/skin - inspection reveals no rashes, lesions or ulcers, normal coloring        Attestation:  I have reviewed today's vital signs, notes, medications, labs and imaging.    Alejandra Cruz MD  [facility]          ADDENDUM:  Length of stay: 2 days  Indicate Y or N for the following:  UTI  No  C diff  No  PNA  No  SSI No  DVT No  PE  No  CVA No  MI No  Enterocutaneous fistula  No  Peripheral nerve injury  No  Abscess (not adjacent to anastomosis)  No  Leak No    Treated with:   Antibiotics N/A   Drain  N/A   Reoperation  N/A  Death within 30 days No  Reintubation  No  Reoperation  No   Procedure

## 2024-04-19 NOTE — PROGRESS NOTES
COLON & RECTAL SURGERY  PROGRESS NOTE    April 19, 2024  Post-op Day # 2    SUBJECTIVE:  Doing well and pain is well controlled. Main complaint is discomfort with austin. Passing flatus. 4 loose Bms yesterday. Mild nausea this am. No vomiting. Tolerating fulls, wants to eat. Ambulating halls. UOP yesterday 3,350. Hgb today 9.9 (down from 10.3 yesterday afternoon). Cr 0.88. AVSS.    OBJECTIVE:  Temp:  [97.5  F (36.4  C)-98.9  F (37.2  C)] 98.4  F (36.9  C)  Pulse:  [67-80] 74  Resp:  [16-18] 18  BP: (107-121)/(53-63) 107/59  SpO2:  [92 %-95 %] 94 %    Intake/Output Summary (Last 24 hours) at 4/19/2024 0736  Last data filed at 4/19/2024 0600  Gross per 24 hour   Intake 3918 ml   Output 3350 ml   Net 568 ml       GENERAL:  Awake, alert, no acute distress, lying in bed  HEAD: Normocephalic atraumatic  SCLERA: Anicteric  EXTREMITIES: Warm and well perfused  ABDOMEN:  Soft, appropriately tender per incisions, non-distended. No guarding, rigidity, or peritoneal signs.   INCISION:  C/d/i,  6 lap sites and 1 midline incision     LABS:  Lab Results   Component Value Date    WBC 11.5 04/17/2024    WBC 6.6 10/17/2019     Lab Results   Component Value Date    HGB 9.9 04/19/2024    HGB 17.2 10/17/2019     Lab Results   Component Value Date    HCT 44.7 04/17/2024    HCT 49.0 10/17/2019     Lab Results   Component Value Date     04/18/2024     10/17/2019     Last Basic Metabolic Panel:  Lab Results   Component Value Date     04/18/2024     10/17/2019      Lab Results   Component Value Date    POTASSIUM 3.9 04/18/2024    POTASSIUM 3.8 10/17/2019     Lab Results   Component Value Date    CHLORIDE 104 04/18/2024    CHLORIDE 106 10/17/2019     Lab Results   Component Value Date    LENORE 8.2 04/18/2024    LENORE 9.3 10/17/2019     Lab Results   Component Value Date    CO2 22 04/18/2024    CO2 26 10/17/2019     Lab Results   Component Value Date    BUN 15.3 04/18/2024    BUN 17 10/17/2019     Lab Results   Component  Value Date    CR 0.88 04/18/2024    CR 0.84 10/17/2019     Lab Results   Component Value Date     04/19/2024     10/17/2019       ASSESSMENT/PLAN: Kuldeep Alba is a 68yoM POD#1 robotic assisted sigmoid colectomy. He is recovering as expected and pain is well controlled. His UOP has improved so ok to remove austin and discontinue IVF. He is tolerating fulls so advance to LFD.     - Low fiber diet  -Remove austin.  - PRN pain meds  -Recheck Hgb in am.  -Monitor I&O's  -Discontinue IVF.  - OOB, encouraged ambulation  - SQH for ppx     Discussed with Dr. Cruz    For questions/paging, please contact the CRS office at [phone].    Danielle Ramos PA-C  Colorectal Physician Assistant    [facility]  [address]  [place] [address]  T: [phone]  F: [phone]

## 2024-04-19 NOTE — PROGRESS NOTES
Patient discharged at 4:13 PM to Discharged to home  IV was discontinued. Belongings returned to patient.  Discharge instructions and medications reviewed with patient.  Patient verbalized understanding and all questions were answered.  Prescriptions given to patient.  At time of discharge, patient condition was stable and left the unit gen surg escorted by transport NA.

## 2024-04-24 ENCOUNTER — APPOINTMENT (OUTPATIENT)
Dept: CT IMAGING | Facility: CLINIC | Age: 69
DRG: 329 | End: 2024-04-24
Attending: STUDENT IN AN ORGANIZED HEALTH CARE EDUCATION/TRAINING PROGRAM
Payer: COMMERCIAL

## 2024-04-24 ENCOUNTER — ANESTHESIA (OUTPATIENT)
Dept: SURGERY | Facility: CLINIC | Age: 69
DRG: 329 | End: 2024-04-24
Payer: COMMERCIAL

## 2024-04-24 ENCOUNTER — HOSPITAL ENCOUNTER (INPATIENT)
Facility: CLINIC | Age: 69
LOS: 13 days | Discharge: HOME HEALTH | DRG: 329 | End: 2024-05-07
Attending: STUDENT IN AN ORGANIZED HEALTH CARE EDUCATION/TRAINING PROGRAM | Admitting: COLON & RECTAL SURGERY
Payer: COMMERCIAL

## 2024-04-24 ENCOUNTER — ANESTHESIA EVENT (OUTPATIENT)
Dept: SURGERY | Facility: CLINIC | Age: 69
DRG: 329 | End: 2024-04-24
Payer: COMMERCIAL

## 2024-04-24 DIAGNOSIS — T81.9XXA COMPLICATION OF PROCEDURE, INITIAL ENCOUNTER: ICD-10-CM

## 2024-04-24 DIAGNOSIS — Z93.3 COLOSTOMY STATUS (H): Primary | ICD-10-CM

## 2024-04-24 DIAGNOSIS — K57.20 DIVERTICULITIS OF LARGE INTESTINE WITH PERFORATION AND ABSCESS WITHOUT BLEEDING: ICD-10-CM

## 2024-04-24 DIAGNOSIS — K92.1 BLOOD IN STOOL: ICD-10-CM

## 2024-04-24 LAB
ABO/RH(D): NORMAL
ALBUMIN SERPL BCG-MCNC: 3.2 G/DL (ref 3.5–5.2)
ALP SERPL-CCNC: 67 U/L (ref 40–150)
ALT SERPL W P-5'-P-CCNC: 8 U/L (ref 0–70)
ANION GAP SERPL CALCULATED.3IONS-SCNC: 16 MMOL/L (ref 7–15)
ANTIBODY SCREEN: NEGATIVE
AST SERPL W P-5'-P-CCNC: 13 U/L (ref 0–45)
BASOPHILS # BLD AUTO: ABNORMAL 10*3/UL
BASOPHILS # BLD MANUAL: 0 10E3/UL (ref 0–0.2)
BASOPHILS NFR BLD AUTO: ABNORMAL %
BASOPHILS NFR BLD MANUAL: 0 %
BILIRUB SERPL-MCNC: 1.7 MG/DL
BUN SERPL-MCNC: 17.2 MG/DL (ref 8–23)
CALCIUM SERPL-MCNC: 8.7 MG/DL (ref 8.8–10.2)
CHLORIDE SERPL-SCNC: 98 MMOL/L (ref 98–107)
CREAT SERPL-MCNC: 0.89 MG/DL (ref 0.67–1.17)
DEPRECATED HCO3 PLAS-SCNC: 21 MMOL/L (ref 22–29)
EGFRCR SERPLBLD CKD-EPI 2021: >90 ML/MIN/1.73M2
EOSINOPHIL # BLD AUTO: ABNORMAL 10*3/UL
EOSINOPHIL # BLD MANUAL: 0 10E3/UL (ref 0–0.7)
EOSINOPHIL NFR BLD AUTO: ABNORMAL %
EOSINOPHIL NFR BLD MANUAL: 0 %
ERYTHROCYTE [DISTWIDTH] IN BLOOD BY AUTOMATED COUNT: 15.8 % (ref 10–15)
GLUCOSE SERPL-MCNC: 137 MG/DL (ref 70–99)
HCT VFR BLD AUTO: 33.7 % (ref 40–53)
HGB BLD-MCNC: 11.3 G/DL (ref 13.3–17.7)
HOLD SPECIMEN: NORMAL
IMM GRANULOCYTES # BLD: ABNORMAL 10*3/UL
IMM GRANULOCYTES NFR BLD: ABNORMAL %
LYMPHOCYTES # BLD AUTO: ABNORMAL 10*3/UL
LYMPHOCYTES # BLD MANUAL: 0.9 10E3/UL (ref 0.8–5.3)
LYMPHOCYTES NFR BLD AUTO: ABNORMAL %
LYMPHOCYTES NFR BLD MANUAL: 7 %
MCH RBC QN AUTO: 29.7 PG (ref 26.5–33)
MCHC RBC AUTO-ENTMCNC: 33.5 G/DL (ref 31.5–36.5)
MCV RBC AUTO: 89 FL (ref 78–100)
METAMYELOCYTES # BLD MANUAL: 1.7 10E3/UL
METAMYELOCYTES NFR BLD MANUAL: 14 %
MONOCYTES # BLD AUTO: ABNORMAL 10*3/UL
MONOCYTES # BLD MANUAL: 0.6 10E3/UL (ref 0–1.3)
MONOCYTES NFR BLD AUTO: ABNORMAL %
MONOCYTES NFR BLD MANUAL: 5 %
MYELOCYTES # BLD MANUAL: 0.1 10E3/UL
MYELOCYTES NFR BLD MANUAL: 1 %
NEUTROPHILS # BLD AUTO: ABNORMAL 10*3/UL
NEUTROPHILS # BLD MANUAL: 8.9 10E3/UL (ref 1.6–8.3)
NEUTROPHILS NFR BLD AUTO: ABNORMAL %
NEUTROPHILS NFR BLD MANUAL: 73 %
NRBC # BLD AUTO: 0 10E3/UL
NRBC BLD AUTO-RTO: 0 /100
PLAT MORPH BLD: ABNORMAL
PLATELET # BLD AUTO: 427 10E3/UL (ref 150–450)
POTASSIUM SERPL-SCNC: 4.2 MMOL/L (ref 3.4–5.3)
PROT SERPL-MCNC: 6.5 G/DL (ref 6.4–8.3)
RBC # BLD AUTO: 3.8 10E6/UL (ref 4.4–5.9)
RBC MORPH BLD: ABNORMAL
SODIUM SERPL-SCNC: 135 MMOL/L (ref 135–145)
SPECIMEN EXPIRATION DATE: NORMAL
WBC # BLD AUTO: 12.2 10E3/UL (ref 4–11)

## 2024-04-24 PROCEDURE — 36415 COLL VENOUS BLD VENIPUNCTURE: CPT | Performed by: STUDENT IN AN ORGANIZED HEALTH CARE EDUCATION/TRAINING PROGRAM

## 2024-04-24 PROCEDURE — 88307 TISSUE EXAM BY PATHOLOGIST: CPT | Mod: 26 | Performed by: PATHOLOGY

## 2024-04-24 PROCEDURE — 999N000141 HC STATISTIC PRE-PROCEDURE NURSING ASSESSMENT: Performed by: COLON & RECTAL SURGERY

## 2024-04-24 PROCEDURE — 272N000001 HC OR GENERAL SUPPLY STERILE: Performed by: COLON & RECTAL SURGERY

## 2024-04-24 PROCEDURE — 250N000011 HC RX IP 250 OP 636: Performed by: NURSE ANESTHETIST, CERTIFIED REGISTERED

## 2024-04-24 PROCEDURE — 88307 TISSUE EXAM BY PATHOLOGIST: CPT | Mod: TC | Performed by: COLON & RECTAL SURGERY

## 2024-04-24 PROCEDURE — 0D1M0Z4 BYPASS DESCENDING COLON TO CUTANEOUS, OPEN APPROACH: ICD-10-PCS | Performed by: COLON & RECTAL SURGERY

## 2024-04-24 PROCEDURE — 44005 FREEING OF BOWEL ADHESION: CPT | Performed by: NURSE ANESTHETIST, CERTIFIED REGISTERED

## 2024-04-24 PROCEDURE — 85027 COMPLETE CBC AUTOMATED: CPT | Performed by: STUDENT IN AN ORGANIZED HEALTH CARE EDUCATION/TRAINING PROGRAM

## 2024-04-24 PROCEDURE — 86900 BLOOD TYPING SEROLOGIC ABO: CPT | Performed by: STUDENT IN AN ORGANIZED HEALTH CARE EDUCATION/TRAINING PROGRAM

## 2024-04-24 PROCEDURE — 96374 THER/PROPH/DIAG INJ IV PUSH: CPT

## 2024-04-24 PROCEDURE — 250N000011 HC RX IP 250 OP 636: Performed by: STUDENT IN AN ORGANIZED HEALTH CARE EDUCATION/TRAINING PROGRAM

## 2024-04-24 PROCEDURE — 99285 EMERGENCY DEPT VISIT HI MDM: CPT | Mod: 25

## 2024-04-24 PROCEDURE — 360N000076 HC SURGERY LEVEL 3, PER MIN: Performed by: COLON & RECTAL SURGERY

## 2024-04-24 PROCEDURE — 0DJD8ZZ INSPECTION OF LOWER INTESTINAL TRACT, VIA NATURAL OR ARTIFICIAL OPENING ENDOSCOPIC: ICD-10-PCS | Performed by: COLON & RECTAL SURGERY

## 2024-04-24 PROCEDURE — 250N000009 HC RX 250: Performed by: ANESTHESIOLOGY

## 2024-04-24 PROCEDURE — 250N000009 HC RX 250: Performed by: COLON & RECTAL SURGERY

## 2024-04-24 PROCEDURE — 74174 CTA ABD&PLVS W/CONTRAST: CPT

## 2024-04-24 PROCEDURE — 710N000009 HC RECOVERY PHASE 1, LEVEL 1, PER MIN: Performed by: COLON & RECTAL SURGERY

## 2024-04-24 PROCEDURE — 44005 FREEING OF BOWEL ADHESION: CPT | Performed by: ANESTHESIOLOGY

## 2024-04-24 PROCEDURE — 82247 BILIRUBIN TOTAL: CPT | Performed by: STUDENT IN AN ORGANIZED HEALTH CARE EDUCATION/TRAINING PROGRAM

## 2024-04-24 PROCEDURE — 250N000025 HC SEVOFLURANE, PER MIN: Performed by: COLON & RECTAL SURGERY

## 2024-04-24 PROCEDURE — 250N000011 HC RX IP 250 OP 636: Performed by: ANESTHESIOLOGY

## 2024-04-24 PROCEDURE — 96361 HYDRATE IV INFUSION ADD-ON: CPT

## 2024-04-24 PROCEDURE — 120N000001 HC R&B MED SURG/OB

## 2024-04-24 PROCEDURE — 0DBP0ZZ EXCISION OF RECTUM, OPEN APPROACH: ICD-10-PCS | Performed by: COLON & RECTAL SURGERY

## 2024-04-24 PROCEDURE — 250N000009 HC RX 250: Performed by: NURSE ANESTHETIST, CERTIFIED REGISTERED

## 2024-04-24 PROCEDURE — 250N000011 HC RX IP 250 OP 636: Performed by: COLON & RECTAL SURGERY

## 2024-04-24 PROCEDURE — 0DBM0ZZ EXCISION OF DESCENDING COLON, OPEN APPROACH: ICD-10-PCS | Performed by: COLON & RECTAL SURGERY

## 2024-04-24 PROCEDURE — 85007 BL SMEAR W/DIFF WBC COUNT: CPT | Performed by: STUDENT IN AN ORGANIZED HEALTH CARE EDUCATION/TRAINING PROGRAM

## 2024-04-24 PROCEDURE — 258N000003 HC RX IP 258 OP 636: Performed by: ANESTHESIOLOGY

## 2024-04-24 PROCEDURE — 258N000003 HC RX IP 258 OP 636: Performed by: COLON & RECTAL SURGERY

## 2024-04-24 PROCEDURE — 258N000003 HC RX IP 258 OP 636: Performed by: STUDENT IN AN ORGANIZED HEALTH CARE EDUCATION/TRAINING PROGRAM

## 2024-04-24 PROCEDURE — 370N000017 HC ANESTHESIA TECHNICAL FEE, PER MIN: Performed by: COLON & RECTAL SURGERY

## 2024-04-24 PROCEDURE — 258N000003 HC RX IP 258 OP 636: Performed by: NURSE ANESTHETIST, CERTIFIED REGISTERED

## 2024-04-24 PROCEDURE — 250N000009 HC RX 250: Performed by: STUDENT IN AN ORGANIZED HEALTH CARE EDUCATION/TRAINING PROGRAM

## 2024-04-24 RX ORDER — LABETALOL HYDROCHLORIDE 5 MG/ML
5 INJECTION, SOLUTION INTRAVENOUS
Status: DISCONTINUED | OUTPATIENT
Start: 2024-04-24 | End: 2024-04-24 | Stop reason: HOSPADM

## 2024-04-24 RX ORDER — FENTANYL CITRATE 50 UG/ML
50 INJECTION, SOLUTION INTRAMUSCULAR; INTRAVENOUS EVERY 5 MIN PRN
Status: DISCONTINUED | OUTPATIENT
Start: 2024-04-24 | End: 2024-04-24 | Stop reason: HOSPADM

## 2024-04-24 RX ORDER — ONDANSETRON 4 MG/1
4 TABLET, ORALLY DISINTEGRATING ORAL EVERY 6 HOURS PRN
Status: DISCONTINUED | OUTPATIENT
Start: 2024-04-24 | End: 2024-05-07 | Stop reason: HOSPADM

## 2024-04-24 RX ORDER — NALOXONE HYDROCHLORIDE 0.4 MG/ML
0.2 INJECTION, SOLUTION INTRAMUSCULAR; INTRAVENOUS; SUBCUTANEOUS
Status: DISCONTINUED | OUTPATIENT
Start: 2024-04-24 | End: 2024-05-07 | Stop reason: HOSPADM

## 2024-04-24 RX ORDER — NALOXONE HYDROCHLORIDE 0.4 MG/ML
0.1 INJECTION, SOLUTION INTRAMUSCULAR; INTRAVENOUS; SUBCUTANEOUS
Status: DISCONTINUED | OUTPATIENT
Start: 2024-04-24 | End: 2024-04-24 | Stop reason: HOSPADM

## 2024-04-24 RX ORDER — SODIUM CHLORIDE, SODIUM LACTATE, POTASSIUM CHLORIDE, CALCIUM CHLORIDE 600; 310; 30; 20 MG/100ML; MG/100ML; MG/100ML; MG/100ML
INJECTION, SOLUTION INTRAVENOUS CONTINUOUS
Status: DISCONTINUED | OUTPATIENT
Start: 2024-04-24 | End: 2024-04-26

## 2024-04-24 RX ORDER — PROPOFOL 10 MG/ML
INJECTION, EMULSION INTRAVENOUS CONTINUOUS PRN
Status: DISCONTINUED | OUTPATIENT
Start: 2024-04-24 | End: 2024-04-24

## 2024-04-24 RX ORDER — NALOXONE HYDROCHLORIDE 0.4 MG/ML
0.4 INJECTION, SOLUTION INTRAMUSCULAR; INTRAVENOUS; SUBCUTANEOUS
Status: DISCONTINUED | OUTPATIENT
Start: 2024-04-24 | End: 2024-05-07 | Stop reason: HOSPADM

## 2024-04-24 RX ORDER — PIPERACILLIN SODIUM, TAZOBACTAM SODIUM 4; .5 G/20ML; G/20ML
4.5 INJECTION, POWDER, LYOPHILIZED, FOR SOLUTION INTRAVENOUS EVERY 6 HOURS
Status: COMPLETED | OUTPATIENT
Start: 2024-04-24 | End: 2024-04-29

## 2024-04-24 RX ORDER — LIDOCAINE 40 MG/G
CREAM TOPICAL
Status: DISCONTINUED | OUTPATIENT
Start: 2024-04-24 | End: 2024-04-30

## 2024-04-24 RX ORDER — SIMETHICONE 80 MG
80 TABLET,CHEWABLE ORAL 4 TIMES DAILY PRN
Status: DISCONTINUED | OUTPATIENT
Start: 2024-04-24 | End: 2024-05-07 | Stop reason: HOSPADM

## 2024-04-24 RX ORDER — ONDANSETRON 4 MG/1
4 TABLET, ORALLY DISINTEGRATING ORAL EVERY 30 MIN PRN
Status: DISCONTINUED | OUTPATIENT
Start: 2024-04-24 | End: 2024-04-24 | Stop reason: HOSPADM

## 2024-04-24 RX ORDER — FENTANYL CITRATE 50 UG/ML
INJECTION, SOLUTION INTRAMUSCULAR; INTRAVENOUS PRN
Status: DISCONTINUED | OUTPATIENT
Start: 2024-04-24 | End: 2024-04-24

## 2024-04-24 RX ORDER — HYDROMORPHONE HYDROCHLORIDE 1 MG/ML
INJECTION, SOLUTION INTRAMUSCULAR; INTRAVENOUS; SUBCUTANEOUS PRN
Status: DISCONTINUED | OUTPATIENT
Start: 2024-04-24 | End: 2024-04-24

## 2024-04-24 RX ORDER — CYCLOBENZAPRINE HCL 10 MG
10 TABLET ORAL 3 TIMES DAILY
Status: DISCONTINUED | OUTPATIENT
Start: 2024-04-25 | End: 2024-05-03

## 2024-04-24 RX ORDER — SODIUM CHLORIDE, SODIUM LACTATE, POTASSIUM CHLORIDE, CALCIUM CHLORIDE 600; 310; 30; 20 MG/100ML; MG/100ML; MG/100ML; MG/100ML
INJECTION, SOLUTION INTRAVENOUS CONTINUOUS
Status: DISCONTINUED | OUTPATIENT
Start: 2024-04-24 | End: 2024-04-24 | Stop reason: HOSPADM

## 2024-04-24 RX ORDER — ONDANSETRON 2 MG/ML
4 INJECTION INTRAMUSCULAR; INTRAVENOUS EVERY 6 HOURS PRN
Status: DISCONTINUED | OUTPATIENT
Start: 2024-04-24 | End: 2024-05-07 | Stop reason: HOSPADM

## 2024-04-24 RX ORDER — ONDANSETRON 2 MG/ML
4 INJECTION INTRAMUSCULAR; INTRAVENOUS ONCE
Status: COMPLETED | OUTPATIENT
Start: 2024-04-24 | End: 2024-04-24

## 2024-04-24 RX ORDER — FENTANYL CITRATE 50 UG/ML
25 INJECTION, SOLUTION INTRAMUSCULAR; INTRAVENOUS EVERY 5 MIN PRN
Status: DISCONTINUED | OUTPATIENT
Start: 2024-04-24 | End: 2024-04-24 | Stop reason: HOSPADM

## 2024-04-24 RX ORDER — CYCLOBENZAPRINE HCL 10 MG
1 TABLET ORAL
COMMUNITY
Start: 2024-04-23

## 2024-04-24 RX ORDER — HEPARIN SODIUM 5000 [USP'U]/.5ML
5000 INJECTION, SOLUTION INTRAVENOUS; SUBCUTANEOUS EVERY 8 HOURS
Status: DISCONTINUED | OUTPATIENT
Start: 2024-04-25 | End: 2024-05-07 | Stop reason: HOSPADM

## 2024-04-24 RX ORDER — CEFAZOLIN SODIUM/WATER 2 G/20 ML
2 SYRINGE (ML) INTRAVENOUS
Status: COMPLETED | OUTPATIENT
Start: 2024-04-24 | End: 2024-04-24

## 2024-04-24 RX ORDER — KETOROLAC TROMETHAMINE 15 MG/ML
15 INJECTION, SOLUTION INTRAMUSCULAR; INTRAVENOUS EVERY 6 HOURS PRN
Status: DISPENSED | OUTPATIENT
Start: 2024-04-24 | End: 2024-04-28

## 2024-04-24 RX ORDER — IOPAMIDOL 755 MG/ML
135 INJECTION, SOLUTION INTRAVASCULAR ONCE
Status: COMPLETED | OUTPATIENT
Start: 2024-04-24 | End: 2024-04-24

## 2024-04-24 RX ORDER — VANCOMYCIN HYDROCHLORIDE 125 MG/1
125 CAPSULE ORAL 2 TIMES DAILY
Status: COMPLETED | OUTPATIENT
Start: 2024-04-24 | End: 2024-05-01

## 2024-04-24 RX ORDER — CEFAZOLIN SODIUM/WATER 2 G/20 ML
2 SYRINGE (ML) INTRAVENOUS SEE ADMIN INSTRUCTIONS
Status: DISCONTINUED | OUTPATIENT
Start: 2024-04-24 | End: 2024-04-24 | Stop reason: HOSPADM

## 2024-04-24 RX ORDER — ONDANSETRON 2 MG/ML
4 INJECTION INTRAMUSCULAR; INTRAVENOUS EVERY 30 MIN PRN
Status: DISCONTINUED | OUTPATIENT
Start: 2024-04-24 | End: 2024-04-24 | Stop reason: HOSPADM

## 2024-04-24 RX ORDER — ACETAMINOPHEN 325 MG/1
975 TABLET ORAL EVERY 8 HOURS
Status: COMPLETED | OUTPATIENT
Start: 2024-04-24 | End: 2024-04-27

## 2024-04-24 RX ORDER — EPHEDRINE SULFATE 50 MG/ML
INJECTION, SOLUTION INTRAMUSCULAR; INTRAVENOUS; SUBCUTANEOUS PRN
Status: DISCONTINUED | OUTPATIENT
Start: 2024-04-24 | End: 2024-04-24

## 2024-04-24 RX ORDER — ACETAMINOPHEN 325 MG/1
650 TABLET ORAL EVERY 4 HOURS PRN
Status: DISCONTINUED | OUTPATIENT
Start: 2024-04-27 | End: 2024-05-07 | Stop reason: HOSPADM

## 2024-04-24 RX ORDER — DEXAMETHASONE SODIUM PHOSPHATE 4 MG/ML
INJECTION, SOLUTION INTRA-ARTICULAR; INTRALESIONAL; INTRAMUSCULAR; INTRAVENOUS; SOFT TISSUE PRN
Status: DISCONTINUED | OUTPATIENT
Start: 2024-04-24 | End: 2024-04-24

## 2024-04-24 RX ORDER — PIPERACILLIN SODIUM, TAZOBACTAM SODIUM 4; .5 G/20ML; G/20ML
4.5 INJECTION, POWDER, LYOPHILIZED, FOR SOLUTION INTRAVENOUS ONCE
Status: COMPLETED | OUTPATIENT
Start: 2024-04-24 | End: 2024-04-24

## 2024-04-24 RX ORDER — HYDRALAZINE HYDROCHLORIDE 20 MG/ML
2.5-5 INJECTION INTRAMUSCULAR; INTRAVENOUS
Status: DISCONTINUED | OUTPATIENT
Start: 2024-04-24 | End: 2024-04-24 | Stop reason: HOSPADM

## 2024-04-24 RX ORDER — HYDROMORPHONE HCL IN WATER/PF 6 MG/30 ML
0.2 PATIENT CONTROLLED ANALGESIA SYRINGE INTRAVENOUS EVERY 5 MIN PRN
Status: DISCONTINUED | OUTPATIENT
Start: 2024-04-24 | End: 2024-04-24 | Stop reason: HOSPADM

## 2024-04-24 RX ORDER — MEPERIDINE HYDROCHLORIDE 25 MG/ML
12.5 INJECTION INTRAMUSCULAR; INTRAVENOUS; SUBCUTANEOUS EVERY 5 MIN PRN
Status: DISCONTINUED | OUTPATIENT
Start: 2024-04-24 | End: 2024-04-24 | Stop reason: HOSPADM

## 2024-04-24 RX ORDER — LIDOCAINE HYDROCHLORIDE 20 MG/ML
INJECTION, SOLUTION INFILTRATION; PERINEURAL PRN
Status: DISCONTINUED | OUTPATIENT
Start: 2024-04-24 | End: 2024-04-24

## 2024-04-24 RX ORDER — MAGNESIUM HYDROXIDE 1200 MG/15ML
LIQUID ORAL PRN
Status: DISCONTINUED | OUTPATIENT
Start: 2024-04-24 | End: 2024-04-24 | Stop reason: HOSPADM

## 2024-04-24 RX ORDER — LORAZEPAM 2 MG/ML
0.5 INJECTION INTRAMUSCULAR EVERY 6 HOURS PRN
Status: DISCONTINUED | OUTPATIENT
Start: 2024-04-24 | End: 2024-05-07 | Stop reason: HOSPADM

## 2024-04-24 RX ORDER — ONDANSETRON 2 MG/ML
INJECTION INTRAMUSCULAR; INTRAVENOUS PRN
Status: DISCONTINUED | OUTPATIENT
Start: 2024-04-24 | End: 2024-04-24

## 2024-04-24 RX ORDER — PROPOFOL 10 MG/ML
INJECTION, EMULSION INTRAVENOUS PRN
Status: DISCONTINUED | OUTPATIENT
Start: 2024-04-24 | End: 2024-04-24

## 2024-04-24 RX ORDER — MORPHINE SULFATE 4 MG/ML
4 INJECTION, SOLUTION INTRAMUSCULAR; INTRAVENOUS ONCE
Status: COMPLETED | OUTPATIENT
Start: 2024-04-24 | End: 2024-04-24

## 2024-04-24 RX ADMIN — PHENYLEPHRINE HYDROCHLORIDE 100 MCG: 10 INJECTION INTRAVENOUS at 18:31

## 2024-04-24 RX ADMIN — ROCURONIUM BROMIDE 20 MG: 50 INJECTION, SOLUTION INTRAVENOUS at 18:52

## 2024-04-24 RX ADMIN — PHENYLEPHRINE HYDROCHLORIDE 150 MCG: 10 INJECTION INTRAVENOUS at 18:45

## 2024-04-24 RX ADMIN — HYDROMORPHONE HYDROCHLORIDE 0.5 MG: 1 INJECTION, SOLUTION INTRAMUSCULAR; INTRAVENOUS; SUBCUTANEOUS at 21:09

## 2024-04-24 RX ADMIN — PROPOFOL 30 MCG/KG/MIN: 10 INJECTION, EMULSION INTRAVENOUS at 18:47

## 2024-04-24 RX ADMIN — MIDAZOLAM 2 MG: 1 INJECTION INTRAMUSCULAR; INTRAVENOUS at 18:25

## 2024-04-24 RX ADMIN — ROCURONIUM BROMIDE 20 MG: 50 INJECTION, SOLUTION INTRAVENOUS at 19:17

## 2024-04-24 RX ADMIN — Medication 2 G: at 18:31

## 2024-04-24 RX ADMIN — SODIUM CHLORIDE, POTASSIUM CHLORIDE, SODIUM LACTATE AND CALCIUM CHLORIDE: 600; 310; 30; 20 INJECTION, SOLUTION INTRAVENOUS at 17:55

## 2024-04-24 RX ADMIN — Medication 5 MG: at 18:58

## 2024-04-24 RX ADMIN — Medication 5 MG: at 18:46

## 2024-04-24 RX ADMIN — PIPERACILLIN AND TAZOBACTAM 4.5 G: 4; .5 INJECTION, POWDER, FOR SOLUTION INTRAVENOUS at 16:58

## 2024-04-24 RX ADMIN — ONDANSETRON 4 MG: 2 INJECTION INTRAMUSCULAR; INTRAVENOUS at 20:52

## 2024-04-24 RX ADMIN — PHENYLEPHRINE HYDROCHLORIDE 0.5 MCG/KG/MIN: 10 INJECTION INTRAVENOUS at 18:45

## 2024-04-24 RX ADMIN — SODIUM CHLORIDE, POTASSIUM CHLORIDE, SODIUM LACTATE AND CALCIUM CHLORIDE: 600; 310; 30; 20 INJECTION, SOLUTION INTRAVENOUS at 23:06

## 2024-04-24 RX ADMIN — ROCURONIUM BROMIDE 20 MG: 50 INJECTION, SOLUTION INTRAVENOUS at 20:17

## 2024-04-24 RX ADMIN — FENTANYL CITRATE 50 MCG: 50 INJECTION, SOLUTION INTRAMUSCULAR; INTRAVENOUS at 22:02

## 2024-04-24 RX ADMIN — ONDANSETRON 4 MG: 2 INJECTION INTRAMUSCULAR; INTRAVENOUS at 17:55

## 2024-04-24 RX ADMIN — PROPOFOL 160 MG: 10 INJECTION, EMULSION INTRAVENOUS at 18:31

## 2024-04-24 RX ADMIN — MORPHINE SULFATE 4 MG: 4 INJECTION, SOLUTION INTRAMUSCULAR; INTRAVENOUS at 15:39

## 2024-04-24 RX ADMIN — SUGAMMADEX 200 MG: 100 INJECTION, SOLUTION INTRAVENOUS at 21:21

## 2024-04-24 RX ADMIN — SUCCINYLCHOLINE CHLORIDE 80 MG: 20 INJECTION, SOLUTION INTRAMUSCULAR; INTRAVENOUS; PARENTERAL at 18:31

## 2024-04-24 RX ADMIN — Medication: at 23:53

## 2024-04-24 RX ADMIN — HYDROMORPHONE HYDROCHLORIDE 0.4 MG: 0.2 INJECTION, SOLUTION INTRAMUSCULAR; INTRAVENOUS; SUBCUTANEOUS at 22:15

## 2024-04-24 RX ADMIN — FENTANYL CITRATE 100 MCG: 50 INJECTION INTRAMUSCULAR; INTRAVENOUS at 18:31

## 2024-04-24 RX ADMIN — SODIUM CHLORIDE 1000 ML: 9 INJECTION, SOLUTION INTRAVENOUS at 15:44

## 2024-04-24 RX ADMIN — SODIUM CHLORIDE, POTASSIUM CHLORIDE, SODIUM LACTATE AND CALCIUM CHLORIDE: 600; 310; 30; 20 INJECTION, SOLUTION INTRAVENOUS at 21:18

## 2024-04-24 RX ADMIN — ROCURONIUM BROMIDE 30 MG: 50 INJECTION, SOLUTION INTRAVENOUS at 18:31

## 2024-04-24 RX ADMIN — DEXAMETHASONE SODIUM PHOSPHATE 4 MG: 4 INJECTION, SOLUTION INTRA-ARTICULAR; INTRALESIONAL; INTRAMUSCULAR; INTRAVENOUS; SOFT TISSUE at 19:11

## 2024-04-24 RX ADMIN — FENTANYL CITRATE 50 MCG: 50 INJECTION, SOLUTION INTRAMUSCULAR; INTRAVENOUS at 21:51

## 2024-04-24 RX ADMIN — HYDROMORPHONE HYDROCHLORIDE 0.4 MG: 0.2 INJECTION, SOLUTION INTRAMUSCULAR; INTRAVENOUS; SUBCUTANEOUS at 22:32

## 2024-04-24 RX ADMIN — IOPAMIDOL 135 ML: 755 INJECTION, SOLUTION INTRAVENOUS at 15:49

## 2024-04-24 RX ADMIN — LIDOCAINE HYDROCHLORIDE 100 MG: 20 INJECTION, SOLUTION INFILTRATION; PERINEURAL at 18:31

## 2024-04-24 RX ADMIN — SODIUM CHLORIDE 85 ML: 9 INJECTION, SOLUTION INTRAVENOUS at 15:49

## 2024-04-24 RX ADMIN — SODIUM CHLORIDE, POTASSIUM CHLORIDE, SODIUM LACTATE AND CALCIUM CHLORIDE: 600; 310; 30; 20 INJECTION, SOLUTION INTRAVENOUS at 19:07

## 2024-04-24 ASSESSMENT — ACTIVITIES OF DAILY LIVING (ADL)
ADLS_ACUITY_SCORE: 21
ADLS_ACUITY_SCORE: 38
ADLS_ACUITY_SCORE: 38
ADLS_ACUITY_SCORE: 21
ADLS_ACUITY_SCORE: 38
ADLS_ACUITY_SCORE: 21
ADLS_ACUITY_SCORE: 38

## 2024-04-24 ASSESSMENT — LIFESTYLE VARIABLES: TOBACCO_USE: 1

## 2024-04-24 NOTE — ED NOTES
Bed: ED25  Expected date:   Expected time:   Means of arrival:   Comments:  Bushra 2 68 M GI bleed after colon surgery- here now

## 2024-04-24 NOTE — ANESTHESIA PREPROCEDURE EVALUATION
Anesthesia Pre-Procedure Evaluation    Patient: Castillo Alba   MRN: [id number] : 1955        Procedure : Procedure(s):  Laparotomy, lysis adhesions, Possible resection possible stoma          Past Medical History:   Diagnosis Date    C. difficile colitis 2023    Diverticulosis     History of dental abscess     Sinusitis, chronic     trouble breathing through nose      Past Surgical History:   Procedure Laterality Date    COLONOSCOPY N/A 2024    Procedure: Colonoscopy;  Surgeon: Alejandra Cruz MD;  Location:  GI    DAVINCI XI ASSISTED RESECTION RECTOSIGMOID N/A 2024    Procedure: Robotic assisted sigmoid colectomy, mobilization of splenic flexor;  Surgeon: Alejandra Cruz MD;  Location:  OR    drain placement  2024    NOSE SURGERY        No Known Allergies   Social History     Tobacco Use    Smoking status: Some Days     Current packs/day: 0.00     Types: Cigarettes     Last attempt to quit: 2024     Years since quittin.0    Smokeless tobacco: Never   Substance Use Topics    Alcohol use: Not Currently      Wt Readings from Last 1 Encounters:   24 109.8 kg (242 lb)        Anesthesia Evaluation            ROS/MED HX  ENT/Pulmonary:     (+)                tobacco use, Past use,                       Neurologic:       Cardiovascular:     (+)  - -   -  - -                                 Previous cardiac testing   Echo: Date: Results:    Stress Test:  Date: Results:    ECG Reviewed:  Date:  Results:  NSR  Cath:  Date: Results:      METS/Exercise Tolerance:     Hematologic:     (+)      anemia,          Musculoskeletal:       GI/Hepatic: Comment: Presented to ED today with abdominal pain and evidence of free air and intra-abdominal fluid collection/abscess near his colorectal anastomosis      Renal/Genitourinary:       Endo:     (+)               Obesity,       Psychiatric/Substance Use:       Infectious Disease:       Malignancy:       Other:       "      Physical Exam    Airway        Mallampati: II   TM distance: > 3 FB   Neck ROM: full   Mouth opening: > 3 cm    Respiratory Devices and Support         Dental  no notable dental history     (+) Minor Abnormalities - some fillings, tiny chips      Cardiovascular          Rhythm and rate: regular     Pulmonary           breath sounds clear to auscultation           OUTSIDE LABS:  CBC:   Lab Results   Component Value Date    WBC 12.2 (H) 04/24/2024    WBC 11.5 (H) 04/17/2024    HGB 11.3 (L) 04/24/2024    HGB 9.9 (L) 04/19/2024    HCT 33.7 (L) 04/24/2024    HCT 44.7 04/17/2024     04/24/2024     04/18/2024     BMP:   Lab Results   Component Value Date     04/24/2024     04/19/2024    POTASSIUM 4.2 04/24/2024    POTASSIUM 3.7 04/19/2024    CHLORIDE 98 04/24/2024    CHLORIDE 105 04/19/2024    CO2 21 (L) 04/24/2024    CO2 28 04/19/2024    BUN 17.2 04/24/2024    BUN 7.1 (L) 04/19/2024    CR 0.89 04/24/2024    CR 0.68 04/19/2024     (H) 04/24/2024     (H) 04/19/2024     COAGS: No results found for: \"PTT\", \"INR\", \"FIBR\"  POC: No results found for: \"BGM\", \"HCG\", \"HCGS\"  HEPATIC:   Lab Results   Component Value Date    ALBUMIN 3.2 (L) 04/24/2024    PROTTOTAL 6.5 04/24/2024    ALT 8 04/24/2024    AST 13 04/24/2024    ALKPHOS 67 04/24/2024    BILITOTAL 1.7 (H) 04/24/2024     OTHER:   Lab Results   Component Value Date    LACT 1.2 02/18/2024    LENORE 8.7 (L) 04/24/2024    PHOS 3.5 04/18/2024    MAG 1.9 04/18/2024       Anesthesia Plan    ASA Status:  2       Anesthesia Type: General.     - Airway: ETT   Induction: Propofol, Intravenous.   Maintenance: Balanced.        Consents    Anesthesia Plan(s) and associated risks, benefits, and realistic alternatives discussed. Questions answered and patient/representative(s) expressed understanding.     - Discussed:     - Discussed with:  Patient            Postoperative Care    Pain management: Multi-modal analgesia.   PONV prophylaxis: " Ondansetron (or other 5HT-3), Dexamethasone or Solumedrol, Background Propofol Infusion     Comments:               Julio Owens MD    I have reviewed the pertinent notes and labs in the chart from the past 30 days and (re)examined the patient.  Any updates or changes from those notes are reflected in this note.     # Hyponatremia: Lowest Na = 135 mmol/L in last 30 days, will monitor as appropriate   # Hypocalcemia: Lowest Ca = 8.2 mg/dL in last 30 days, will monitor and replace as appropriate    # Hypoalbuminemia: Lowest albumin = 3.2 g/dL at 4/24/2024  1:49 PM, will monitor as appropriate     # Obesity: Estimated body mass index is 32.82 kg/m  as calculated from the following:    Height as of 4/17/24: 1.829 m (6').    Weight as of 4/17/24: 109.8 kg (242 lb).

## 2024-04-24 NOTE — PHARMACY-ADMISSION MEDICATION HISTORY
Pharmacist Admission Medication History    Admission medication history is complete. The information provided in this note is only as accurate as the sources available at the time of the update.    Information Source(s): Patient, Family member, and CareEverywhere/SureScripts via in-person    Pertinent Information: Completed vanco oral bid x 5 days last week    Changes made to PTA medication list:  Added: cyclobenzaprine  Deleted: vanco  Changed: None    Allergies reviewed with patient and updates made in EHR: yes    Medication History Completed By: Sulema Cortes RPH 4/24/2024 5:14 PM    PTA Med List   Medication Sig Last Dose    acetaminophen (TYLENOL) 325 MG tablet Take 2 tablets (650 mg) by mouth every 6 hours as needed for mild pain 4/24/2024 at am    celecoxib (CELEBREX) 200 MG capsule Take 200 mg by mouth daily 4/21/2024    cyclobenzaprine (FLEXERIL) 10 MG tablet Take 1 tablet by mouth 3 times daily 4/23/2024    oxyCODONE (ROXICODONE) 5 MG tablet Take 1 tablet (5 mg) by mouth every 6 hours as needed for moderate to severe pain 4/24/2024 at 1200?

## 2024-04-24 NOTE — ED PROVIDER NOTES
I received patient in signout from Dr. Rubio.  Briefly, patient is admitted to colorectal surgery services primarily for postop worsening abdominal pain and bloody stools after recent sigmoid colectomy on 4/19/2024.  Please refer to Dr. Rubio's H&P for details.    I was asked to peripherally follow CTA abdomen results.  Patient had already been evaluated by colorectal surgery and they have already preliminarily reviewed imaging.    ED Course as of 04/24/24 1723   Wed Apr 24, 2024   1715  Radiology: Dr. Mo. No GI bleeding, but likely perforation and leak near anastomosis site.Localized air fluid collection with debris within the area of anastomosis.    1723 Dr. Cruz notified of CT results.     Disposition:  Admitted under Wade Do DO  04/24/24 1723

## 2024-04-24 NOTE — ED TRIAGE NOTES
Pt BIB EMS from home, with abdominal pain and blood (bright red) from his rectum over the last 2 days.     Pt with a hx of diverticulitis since last November, and on Wednesday had 1 foot of sigmoid colon removed. EMS performed FAST exam, which they stated was negative.

## 2024-04-24 NOTE — ED NOTES
[facility]  ED Nurse Handoff Report    ED Chief complaint: Rectal Bleeding, Abdominal Pain, and Post-op Problem      ED Diagnosis:   Final diagnoses:   None       Code Status: Hospitalist to discuss with patient    Allergies: No Known Allergies    Patient Story: Pt BIB EMS from home, with abdominal pain and blood (bright red) from his rectum over the last 2 days.      Pt with a hx of diverticulitis since last November, and on Wednesday had 1 foot of sigmoid colon removed. EMS performed FAST exam, which they stated was negative.  Focused Assessment:    Pt with abdominal pain, no rectal bleeding noted upon external inspection    Treatments and/or interventions provided:   Medications   piperacillin-tazobactam (ZOSYN) 4.5 g vial to attach to  mL bag (has no administration in time range)   iopamidol (ISOVUE-370) solution 135 mL (135 mLs Intravenous $Given 4/24/24 1549)   Saline Flush (85 mLs Intravenous $Given 4/24/24 1549)   sodium chloride 0.9% BOLUS 1,000 mL (1,000 mLs Intravenous $New Bag 4/24/24 1544)   morphine (PF) injection 4 mg (4 mg Intravenous $Given 4/24/24 1539)     CTA Abdomen Pelvis with Contrast    (Results Pending)       Patient's response to treatments and/or interventions: Pt less restless and more calm after IV morphine    To be done/followed up on inpatient unit:  SURGERY     Does this patient have any cognitive concerns?:  none    Activity level - Baseline/Home:  Independent  Activity Level - Current:   Stand with assist x2    Patient's Preferred language: English   Needed?: No    Isolation: None  Infection: Not Applicable  Patient tested for COVID 19 prior to admission: NO  Bariatric?: No    Vital Signs:   Vitals:    04/24/24 1352 04/24/24 1400 04/24/24 1600 04/24/24 1630   BP:  (!) 126/100 116/64 120/66   Pulse: 79 77 78 86   Resp: 18 12 12 12   Temp: 97.2  F (36.2  C)      SpO2:  90% (!) 89% 96%       Cardiac Rhythm:     Was the PSS-3 completed:   Yes  What  interventions are required if any?               Family Comments: pt's family at bedside     For the majority of the shift this patient's behavior was Green.   Behavioral interventions performed were none.    ED NURSE PHONE NUMBER: [phone]

## 2024-04-24 NOTE — ED PROVIDER NOTES
History     Chief Complaint:  Rectal Bleeding, Abdominal Pain, and Post-op Problem       HPI   Castillo Alba is a 68 year old male history of C. difficile, diverticulitis, status post sigmoid colectomy on April 19, presenting with worsening abdominal pain over the last 2 days that is generalized.  He has also had multiple episodes of bright red blood mixed in his stool.  Decreased appetite.  Last ate yesterday.  No vomiting.  Reported temperature to 100.4 versus 101.4 last night per wife.  No urinary symptoms.  Taking oxycodone without enough pain relief.    Independent Historian:    Wife assists with history.    Review of External Notes:  Discharge summary April 19, 2024 for diverticulitis and sigmoid colectomy.    Allergies:  No Known Allergies     Physical Exam   Patient Vitals for the past 24 hrs:   BP Temp Pulse Resp SpO2   04/24/24 1630 120/66 -- 86 12 96 %   04/24/24 1600 116/64 -- 78 12 (!) 89 %   04/24/24 1400 (!) 126/100 -- 77 12 90 %   04/24/24 1352 -- 97.2  F (36.2  C) 79 18 --   04/24/24 1342 102/67 -- -- -- 91 %        Physical Exam  GENERAL: Patient appears fatigued but nontoxic.  HEAD: Atraumatic.  NECK: No rigidity  Mouth: Mucosa dry.  CV: RRR, no murmurs rubs or gallops  PULM: CTAB with good aeration; no retractions, rales, rhonchi, or wheezing  ABD: Soft, surgical sites are healing well without erythema or crepitus or pus.  Periumbilical and epigastric tenderness to palpation with mild guarding. No palpable crepitus. No rigidity.  Lower abdomen only mild tenderness to palpation.   DERM: No rash. Skin warm and dry  EXTREMITY: Moving all extremities without difficulty.     Emergency Department Course        Laboratory: Imaging:   Labs Ordered and Resulted from Time of ED Arrival to Time of ED Departure   COMPREHENSIVE METABOLIC PANEL - Abnormal       Result Value    Sodium 135      Potassium 4.2      Carbon Dioxide (CO2) 21 (*)     Anion Gap 16 (*)     Urea Nitrogen 17.2      Creatinine 0.89       GFR Estimate >90      Calcium 8.7 (*)     Chloride 98      Glucose 137 (*)     Alkaline Phosphatase 67      AST 13      ALT 8      Protein Total 6.5      Albumin 3.2 (*)     Bilirubin Total 1.7 (*)    CBC WITH PLATELETS AND DIFFERENTIAL - Abnormal    WBC Count 12.2 (*)     RBC Count 3.80 (*)     Hemoglobin 11.3 (*)     Hematocrit 33.7 (*)     MCV 89      MCH 29.7      MCHC 33.5      RDW 15.8 (*)     Platelet Count 427      % Neutrophils        % Lymphocytes        % Monocytes        % Eosinophils        % Basophils        % Immature Granulocytes        NRBCs per 100 WBC 0      Absolute Neutrophils        Absolute Lymphocytes        Absolute Monocytes        Absolute Eosinophils        Absolute Basophils        Absolute Immature Granulocytes        Absolute NRBCs 0.0     DIFFERENTIAL - Abnormal    % Neutrophils 73      % Lymphocytes 7      % Monocytes 5      % Eosinophils 0      % Basophils 0      % Metamyelocytes 14      % Myelocytes 1      Absolute Neutrophils 8.9 (*)     Absolute Lymphocytes 0.9      Absolute Monocytes 0.6      Absolute Eosinophils 0.0      Absolute Basophils 0.0      Absolute Metamyelocytes 1.7 (*)     Absolute Myelocytes 0.1 (*)     RBC Morphology Confirmed RBC Indices      Platelet Assessment        Value: Automated Count Confirmed. Platelet morphology is normal.   TYPE AND SCREEN, ADULT    ABO/RH(D) O POS      Antibody Screen Negative      SPECIMEN EXPIRATION DATE [id number]     ABO/RH TYPE AND SCREEN     CTA Abdomen Pelvis with Contrast    (Results Pending)              Emergency Department Course & Assessments:             Interventions:  Medications   piperacillin-tazobactam (ZOSYN) 4.5 g vial to attach to  mL bag (has no administration in time range)   iopamidol (ISOVUE-370) solution 135 mL (135 mLs Intravenous $Given 4/24/24 1542)   Saline Flush (85 mLs Intravenous $Given 4/24/24 1549)   sodium chloride 0.9% BOLUS 1,000 mL (1,000 mLs Intravenous $New Bag 4/24/24 1544)    morphine (PF) injection 4 mg (4 mg Intravenous $Given 4/24/24 2547)        Assessments, Independent Interpretation, Consult/Discussion of ManagementTests:   Discussed with colorectal surgery Dr. Simons and Dr. Cruz    Social Determinants of Health affecting care:  None    Disposition:  The patient was admitted to the hospital under the care of Dr. Cruz.     Impression & Plan         Medical Decision Making:  Patient presenting with postoperative pain.  Chronic conditions complicating-recent sigmoid colectomy.  Differential diagnosis-considered intra-abdominal infection, abscess, leak, bleed, and others.  Vital signs reassuring.  He had 1 episode of slight desaturation after being given morphine that improved with nasal cannula.  Labs showed leukocytosis of 12.  CT scan abdomen pelvis ordered.  Discussed with colorectal surgery who evaluated patient at the bedside.  Colorectal surgery recommends antibiotics and they will admit patient for further care.  Given IV Zosyn, IVF and IV morphine.  Patient to be admitted. Turned over to Dr. Olvera pending final CTA imaging. Colorectal will admit.     Diagnosis:    ICD-10-CM    1. Complication of procedure, initial encounter  T81.9XXA       2. Blood in stool  K92.1            Discharge Medications:  New Prescriptions    No medications on file          4/24/2024   Antonio Rubio MD Foss, Kevin, MD  04/24/24 1294     normal...

## 2024-04-24 NOTE — H&P
[facility]  Colon and Rectal Surgery Consult Note  Name: Castillo Alba    MRN: [id number]  YOB: 1955    Age: 68 year old  Date of admission: 4/24/2024  Primary care provider: Martín Morley     Requesting Physician: Dr. Antonio Rubio  Reason for consult: bowel perforation            History of Present Illness:   Castillo Alba is a 68 year old male, seen at the request of Dr. Rubio, who presents with worsening abdominal pain, fatigue and rectal bleeding.  He underwent a robotic assisted left colectomy with colorectal anastomosis 1 week ago for management of complicated diverticulitis.  He did have a significantly inflamed colon at the time of surgery, as well as a small bowel loop that was adherent to the inflamed colon.  He was doing well until Monday when he developed worsening abdominal pain.  He also noticed blood in his stool.  Given that he did not have significant improvement in his symptoms, he presented to the ER for evaluation.  On presentation, he was hemodynamically stable, with a mild leukocytosis, normal hemoglobin and otherwise relatively unremarkable labs.  He underwent a CT scan of the abdomen and pelvis which shows a large amount of free air in the upper abdomen and concern for a possible intra-abdominal fluid collection near his colorectal anastomosis.            Past Medical History:     Past Medical History:   Diagnosis Date    C. difficile colitis 12/2023    Diverticulosis     History of dental abscess     Sinusitis, chronic     trouble breathing through nose             Past Surgical History:     Past Surgical History:   Procedure Laterality Date    COLONOSCOPY N/A 4/16/2024    Procedure: Colonoscopy;  Surgeon: Alejandra Cruz MD;  Location:  GI    DAVINCI XI ASSISTED RESECTION RECTOSIGMOID N/A 4/17/2024    Procedure: Robotic assisted sigmoid colectomy, mobilization of splenic flexor;  Surgeon: Alejandra Cruz MD;  Location:  OR     drain placement  2024    NOSE SURGERY                 Social History:     Social History     Tobacco Use    Smoking status: Some Days     Current packs/day: 0.00     Types: Cigarettes     Last attempt to quit: 2024     Years since quittin.0    Smokeless tobacco: Never   Substance Use Topics    Alcohol use: Not Currently             Family History:   No family history on file.          Allergies:   No Known Allergies          Medications:     Current Facility-Administered Medications   Medication Dose Route Frequency Provider Last Rate Last Admin    piperacillin-tazobactam (ZOSYN) 4.5 g vial to attach to  mL bag  4.5 g Intravenous Once Antonio Rubio MD   4.5 g at 24 1658             Review of Systems:   A comprehensive greater than 10 system review of systems was carried out.  Pertinent positives and negatives are noted above.  Otherwise negative for contributory info.            Physical Exam:     Blood pressure 120/66, pulse 86, temperature 97.2  F (36.2  C), resp. rate 12, SpO2 96%.  No intake or output data in the 24 hours ending 24 1700  Exam:  General - Awake alert and oriented, appears stated age  Pulm - Non-labored breathing with normal respiratory effort  CVS - reg rate and rhythm, no peripheral edema  Abd -soft, nondistended, significant tenderness diffusely without rigidity  Neuro - CN II-XII grossly intact  Musculoskeletal - extremities with no clubbing, cyanosis or edema; able to ambulate  Psych - responsive, alert, cooperative; oriented x3; appropriate mood and affect  External/skin - inspection reveals no rashes, lesions or ulcers, normal coloring             Data Reviewed:   CT A/P: results pending, large amount of free intraperitoneal air concerning for bowel perforation    Recent Labs   Lab 24  1349 24  0711 24  1636 24  0643   WBC 12.2*  --   --   --    HGB 11.3* 9.9* 10.3* 11.2*   HCT 33.7*  --   --   --    MCV 89  --   --   --      --    --  251     Recent Labs   Lab 04/24/24  1349 04/19/24  0711 04/19/24  0635 04/18/24  1636 04/18/24  0643    140  --   --  137   POTASSIUM 4.2 3.7  --   --  3.9   CHLORIDE 98 105  --   --  104   CO2 21* 28  --   --  22   ANIONGAP 16* 7  --   --  11   * 122* 116*  --  132*   BUN 17.2 7.1*  --   --  15.3   CR 0.89 0.68  --  0.88 0.78   GFRESTIMATED >90 >90  --  >90 >90   LENORE 8.7* 8.4*  --   --  8.2*   MAG  --   --   --   --  1.9   PHOS  --   --   --   --  3.5   PROTTOTAL 6.5  --   --   --   --    ALBUMIN 3.2*  --   --   --   --    BILITOTAL 1.7*  --   --   --   --    ALKPHOS 67  --   --   --   --    AST 13  --   --   --   --    ALT 8  --   --   --   --          Assessment and Plan:   Castillo Alba is a 68 year old male who presented with worsening abdominal pain and rectal bleeding.  CT scan of the abdomen and pelvis demonstrates pneumoperitoneum with concern for a bowel perforation.  On exam, he has significant abdominal tenderness and appears pale.    Plan: Plan for operative intervention this evening with an exploratory laparotomy, possible bowel resection and possible colostomy.  We discussed that the most likely source of the perforation would be his colorectal anastomosis, although on his CT imaging this does appear intact.  There were also some small bowel loops that were adherent to the inflamed colon at the time of surgery, which could also be the source of the perforation.  We discussed that if there is evidence of a perforation he may require a bowel resection, and may also require a temporary ostomy bag that would be in place for 3 to 6 months.  Any ostomy bag created would be reversible.  We reviewed the risk benefits of surgical intervention as well as expected postprocedural recovery.  With any abdominal surgery, there is a risk of cardiopulmonary complication, thromboembolic complication, infectious complications, as well as the risk of injury to intra-abdominal structures.  We will  plan to perform a rigid proctoscopy in the OR if the colorectal anastomosis appears intact to see if there is any clear source of bleeding.  All of his and his family's questions were answered.  We will proceed with urgent surgical intervention.    Additional history obtained from chart.  Total time spent on consultation: 60 minutes, including direct patient care and management, counseling and coordination of care.     Clinically Significant Risk Factors Present on Admission              # Hypoalbuminemia: Lowest albumin = 3.2 g/dL at 4/24/2024  1:49 PM, will monitor as appropriate          # Obesity: Estimated body mass index is 32.82 kg/m  as calculated from the following:    Height as of 4/17/24: 1.829 m (6').    Weight as of 4/17/24: 109.8 kg (242 lb).              Alejandra Cruz MD  Colorectal Surgery    [facility]  [address] [address]  T: [phone]  F: [phone]

## 2024-04-25 ENCOUNTER — APPOINTMENT (OUTPATIENT)
Dept: OCCUPATIONAL THERAPY | Facility: CLINIC | Age: 69
DRG: 329 | End: 2024-04-25
Attending: COLON & RECTAL SURGERY
Payer: COMMERCIAL

## 2024-04-25 LAB
ANION GAP SERPL CALCULATED.3IONS-SCNC: 10 MMOL/L (ref 7–15)
BUN SERPL-MCNC: 21.5 MG/DL (ref 8–23)
CALCIUM SERPL-MCNC: 8 MG/DL (ref 8.8–10.2)
CHLORIDE SERPL-SCNC: 101 MMOL/L (ref 98–107)
CREAT SERPL-MCNC: 0.82 MG/DL (ref 0.67–1.17)
DEPRECATED HCO3 PLAS-SCNC: 25 MMOL/L (ref 22–29)
EGFRCR SERPLBLD CKD-EPI 2021: >90 ML/MIN/1.73M2
ERYTHROCYTE [DISTWIDTH] IN BLOOD BY AUTOMATED COUNT: 16 % (ref 10–15)
GLUCOSE BLDC GLUCOMTR-MCNC: 131 MG/DL (ref 70–99)
GLUCOSE SERPL-MCNC: 132 MG/DL (ref 70–99)
HCT VFR BLD AUTO: 32.2 % (ref 40–53)
HGB BLD-MCNC: 11 G/DL (ref 13.3–17.7)
MAGNESIUM SERPL-MCNC: 1.7 MG/DL (ref 1.7–2.3)
MCH RBC QN AUTO: 29.6 PG (ref 26.5–33)
MCHC RBC AUTO-ENTMCNC: 34.2 G/DL (ref 31.5–36.5)
MCV RBC AUTO: 87 FL (ref 78–100)
PHOSPHATE SERPL-MCNC: 3.6 MG/DL (ref 2.5–4.5)
PLATELET # BLD AUTO: 422 10E3/UL (ref 150–450)
POTASSIUM SERPL-SCNC: 4.2 MMOL/L (ref 3.4–5.3)
RADIOLOGIST FLAGS: ABNORMAL
RBC # BLD AUTO: 3.71 10E6/UL (ref 4.4–5.9)
SODIUM SERPL-SCNC: 136 MMOL/L (ref 135–145)
WBC # BLD AUTO: 12.4 10E3/UL (ref 4–11)

## 2024-04-25 PROCEDURE — 97535 SELF CARE MNGMENT TRAINING: CPT | Mod: GO

## 2024-04-25 PROCEDURE — 85048 AUTOMATED LEUKOCYTE COUNT: CPT | Performed by: COLON & RECTAL SURGERY

## 2024-04-25 PROCEDURE — 120N000001 HC R&B MED SURG/OB

## 2024-04-25 PROCEDURE — 250N000013 HC RX MED GY IP 250 OP 250 PS 637: Performed by: COLON & RECTAL SURGERY

## 2024-04-25 PROCEDURE — 84100 ASSAY OF PHOSPHORUS: CPT | Performed by: COLON & RECTAL SURGERY

## 2024-04-25 PROCEDURE — 250N000011 HC RX IP 250 OP 636: Performed by: COLON & RECTAL SURGERY

## 2024-04-25 PROCEDURE — 258N000003 HC RX IP 258 OP 636: Performed by: SURGERY

## 2024-04-25 PROCEDURE — 97165 OT EVAL LOW COMPLEX 30 MIN: CPT | Mod: GO

## 2024-04-25 PROCEDURE — 36415 COLL VENOUS BLD VENIPUNCTURE: CPT | Performed by: COLON & RECTAL SURGERY

## 2024-04-25 PROCEDURE — G0463 HOSPITAL OUTPT CLINIC VISIT: HCPCS

## 2024-04-25 PROCEDURE — 83735 ASSAY OF MAGNESIUM: CPT | Performed by: COLON & RECTAL SURGERY

## 2024-04-25 PROCEDURE — 258N000003 HC RX IP 258 OP 636: Performed by: COLON & RECTAL SURGERY

## 2024-04-25 PROCEDURE — 82374 ASSAY BLOOD CARBON DIOXIDE: CPT | Performed by: COLON & RECTAL SURGERY

## 2024-04-25 RX ADMIN — ONDANSETRON 4 MG: 4 TABLET, ORALLY DISINTEGRATING ORAL at 15:17

## 2024-04-25 RX ADMIN — HEPARIN SODIUM 5000 UNITS: 5000 INJECTION, SOLUTION INTRAVENOUS; SUBCUTANEOUS at 11:10

## 2024-04-25 RX ADMIN — CYCLOBENZAPRINE 10 MG: 10 TABLET, FILM COATED ORAL at 15:02

## 2024-04-25 RX ADMIN — FAMOTIDINE 20 MG: 10 INJECTION, SOLUTION INTRAVENOUS at 00:06

## 2024-04-25 RX ADMIN — SODIUM CHLORIDE, POTASSIUM CHLORIDE, SODIUM LACTATE AND CALCIUM CHLORIDE: 600; 310; 30; 20 INJECTION, SOLUTION INTRAVENOUS at 16:43

## 2024-04-25 RX ADMIN — HEPARIN SODIUM 5000 UNITS: 5000 INJECTION, SOLUTION INTRAVENOUS; SUBCUTANEOUS at 20:12

## 2024-04-25 RX ADMIN — SODIUM CHLORIDE, POTASSIUM CHLORIDE, SODIUM LACTATE AND CALCIUM CHLORIDE: 600; 310; 30; 20 INJECTION, SOLUTION INTRAVENOUS at 07:28

## 2024-04-25 RX ADMIN — ACETAMINOPHEN 975 MG: 325 TABLET, FILM COATED ORAL at 15:02

## 2024-04-25 RX ADMIN — PIPERACILLIN AND TAZOBACTAM 4.5 G: 4; .5 INJECTION, POWDER, FOR SOLUTION INTRAVENOUS at 00:12

## 2024-04-25 RX ADMIN — VANCOMYCIN HYDROCHLORIDE 125 MG: 125 CAPSULE ORAL at 20:12

## 2024-04-25 RX ADMIN — ACETAMINOPHEN 975 MG: 325 TABLET, FILM COATED ORAL at 00:03

## 2024-04-25 RX ADMIN — FAMOTIDINE 20 MG: 10 INJECTION, SOLUTION INTRAVENOUS at 10:11

## 2024-04-25 RX ADMIN — CYCLOBENZAPRINE 10 MG: 10 TABLET, FILM COATED ORAL at 21:52

## 2024-04-25 RX ADMIN — PIPERACILLIN AND TAZOBACTAM 4.5 G: 4; .5 INJECTION, POWDER, FOR SOLUTION INTRAVENOUS at 05:46

## 2024-04-25 RX ADMIN — ACETAMINOPHEN 975 MG: 325 TABLET, FILM COATED ORAL at 23:52

## 2024-04-25 RX ADMIN — BENZOCAINE 6 MG-MENTHOL 10 MG LOZENGES 1 LOZENGE: at 20:28

## 2024-04-25 RX ADMIN — PIPERACILLIN AND TAZOBACTAM 4.5 G: 4; .5 INJECTION, POWDER, FOR SOLUTION INTRAVENOUS at 16:36

## 2024-04-25 RX ADMIN — VANCOMYCIN HYDROCHLORIDE 125 MG: 125 CAPSULE ORAL at 10:12

## 2024-04-25 RX ADMIN — FAMOTIDINE 20 MG: 10 INJECTION, SOLUTION INTRAVENOUS at 20:12

## 2024-04-25 RX ADMIN — VANCOMYCIN HYDROCHLORIDE 125 MG: 125 CAPSULE ORAL at 00:03

## 2024-04-25 RX ADMIN — CYCLOBENZAPRINE 10 MG: 10 TABLET, FILM COATED ORAL at 10:12

## 2024-04-25 RX ADMIN — PIPERACILLIN AND TAZOBACTAM 4.5 G: 4; .5 INJECTION, POWDER, FOR SOLUTION INTRAVENOUS at 11:14

## 2024-04-25 ASSESSMENT — ACTIVITIES OF DAILY LIVING (ADL)
ADLS_ACUITY_SCORE: 23
ADLS_ACUITY_SCORE: 27
ADLS_ACUITY_SCORE: 23
ADLS_ACUITY_SCORE: 21
ADLS_ACUITY_SCORE: 23
PREVIOUS_RESPONSIBILITIES: MEAL PREP;HOUSEKEEPING;LAUNDRY;MEDICATION MANAGEMENT;FINANCES;DRIVING
ADLS_ACUITY_SCORE: 23
ADLS_ACUITY_SCORE: 27
ADLS_ACUITY_SCORE: 23
IADL_COMMENTS: PT REPORTS BEING IND W/ ALL IADL'S AT BASELINE PRIOR TO ADMISSION. PT REPORTS THAT THEY STILL DRIVE.
ADLS_ACUITY_SCORE: 23
ADLS_ACUITY_SCORE: 27

## 2024-04-25 NOTE — OP NOTE
PREOPERATIVE DIAGNOSIS: History of sigmoid colectomy for complicated diverticulitis presenting with abdominal sepsis    POSTOPERATIVE DIAGNOSIS: Pelvic hematoma and anastomotic leak from colorectal anastomosis causing abdominal sepsis with feculent peritonitis    OPERATION PERFORMED:   1.  Exploratory laparotomy with abdominal washout  2.  Resection of colorectal anastomosis  3.  Creation of end colostomy  4.  Rigid proctoscopy     SURGEON: Alejandra Cruz MD     ASSISTANT SURGEON: Love Smith MD, Colon and Rectal Surgery Fellow     ANESTHESIA: General.     INDICATION: Castillo Alba is an 68 year old male who presented to the emergency room with 2 days of worsening abdominal pain.  He underwent an elective robotic converted to open descending and sigmoid colectomy for management of chronic, complicated diverticulitis approximately 1 week ago.  He initially was recovering well, however presented to the ER with worsening abdominal pain, fatigue and passage of blood per rectum.  On physical examination, he was hemodynamically stable, but did have evidence of peritonitis on physical exam.  He underwent a CT scan of the abdomen and pelvis which demonstrated a significant amount of free intraperitoneal air and a complex fluid collection around the colorectal anastomosis concerning for possible anastomotic leak.  He was therefore recommended to undergo an exploratory laparotomy with possible bowel resection and possible stoma.  The risks and benefits of this procedure were discussed in detail with both the patient and his family and they consented to proceed.    OPERATIVE FINDINGS: Feculent peritonitis with a large abdominal and pelvic hematoma around the colorectal anastomosis.  There is a defect in one of the staple lines of the colorectal anastomosis, but difficult to identify a clear point of perforation.  The colorectal anastomosis was resected, the rectal stump oversewn in 2 layers and leak tested, and an end  colostomy created.  The remaining small intestines and colon appeared viable without evidence of a second intestinal injury.  The previous serosal injury from his last operation appeared intact. The abdomen was irrigated with 15 L of warm saline.    PROCEDURE: After informed consent was obtained, the patient was brought to the operating room and placed on the operating room table in the supine position. Sequential compression devices were placed on bilateral lower extremities prior to induction of general anesthesia. General anesthesia was induced without difficulty. He was then placed in a well-padded dorsal lithotomy position and a Zamora catheter was inserted. The abdomen was then sterilely prepped and draped in standard fashion.     A midline incision was made in the skin with a #10 blade, extending from 2 cm above the pubic symphysis to just below the umbilicus. The incision was carried down through the subcutaneous tissue and fat using Bovie electrocautery to reach the midline abdominal fascia. The fascia was incised with cautery and the previous peritoneal closure was gently opened. On entry into the abdomen there was return of murky fluid.  The abdomen was then irrigated with saline, exposing a significant amount of blood clots in the lower abdomen and upper pelvis.  On irrigation of the right lower abdomen and upper pelvis, there was return of feculent material consistent with an anastomotic leak.  With blunt dissection, and irrigation, we were able to mobilize the small bowel out of the pelvis, revealing the previous colorectal anastomosis.  The mesentery of the descending colon appeared significantly inflamed, and there was a significant amount of blood clots around the anastomosis and in the pelvis.  We are eventually able to make a window around the colorectal anastomosis, and transected with a single firing of the contour stapler.    There was significant inflammation and scarring of the rectal stump  and blood clot in the presacral space, and anterior pelvis.  It was difficult to palpate a clear window in the mesentery of the upper rectum, however we were able to visualize viable rectal mucosa at the site of the transection.  We therefore elected to close the rectal stump primarily with sutures rather than try to create a window and divided with the contour stapler.  The upper rectum was then oversewn in a running fashion with a 2-0 Vicryl suture, and then reinforced with a second layer of interrupted 2-0 Vicryl sutures.  Dr. Smith then went below, and performed a leak test of the rectal stump.  He was able to evacuate a significant amount of hematoma from the rectal stump, and the rectal stump was placed under saline.  Despite multiple insufflations of the rectum, there was no evidence of bubbling to suggest a leak in the rectal stump repair.    We then examined the descending colon conduit.  The colorectal anastomosis was still attached to this colon conduit.  We then selected a healthy portion of the descending colon which was approximately 3 cm proximal to the previous colorectal anastomosis.  We made a window in the significantly inflamed and thickened mesentery at this site.  The mesentery was then divided using the LigaSure device with good hemostasis.  A contour stapler was then used to divide the bowel, and the specimen was handed off for pathologic analysis.  Given the previous full mobilization of the splenic flexure, and the significant inflammation within the abdomen, we were able to bluntly mobilize the descending colon and splenic flexure.  The colon conduit was able to easily reach the site in the left upper abdomen at the planned colostomy.    We then continued our abdominal exploration and examined the ascending colon, transverse colon and the majority of the small bowel.  There is no evidence of another injury, ischemia or other concerning finding.  The abdomen was then copiously irrigated,  and throughout the course of the operation, a total of 15 L of warm saline was used to irrigate the abdomen.  A 19 Italian MARIA ISABEL drain was then placed through the previous right lower quadrant incision, and the drain placed in the pelvis to the level of the rectal stump.    We then proceeded with creating our stoma aperture. A small circular incision was made at the stoma site in the left abdomen. Dissection was carried down to the fascia and the fascia opened longitudinally. The rectus muscle was split bluntly and the peritoneum opened. The opening comfortably fit two fingerbreadths. The descending colon conduit was then brought through the stoma site, with the mesentery in an inferior position.    We then prepared for abdominal closure. The peritoneum of the lower midline incision was again closed with a running 2-0 Vicryl suture.  Lower midline incision fascia was closed using looped 0 PDS from both ends of the wound with intervening #1 Vicryl retention sutures. The subcutaneous tissue was then irrigated with saline solution and the skin loosely re-approximated with staples and packed with a Betadine soaked Kerlex.  The colostomy was then matured in standard Inga fashion using 3- 0 Vicryl sutures.  An ostomy appliance was then placed.    The patient tolerated this procedure well without complications. At the end of the procedure, all sponge, needle and instrument counts were correct. The patient was extubated in the operating room and brought to the recovery room in stable condition.     EBL: 200 ml  SPECIMEN: Colorectal anastomosis  COMPLICATIONS: none    Alejandra Cruz MD

## 2024-04-25 NOTE — ANESTHESIA POSTPROCEDURE EVALUATION
Patient: Castillo Alba    Procedure: Procedure(s):  EXPLORATORY LAPAROTOMY, RESECTION OF COLORECTAL ANASTAMOIS, ABDOMINAL WASHOUT, END COLOSTOMY       Anesthesia Type:  General    Note:  Disposition: Admission   Postop Pain Control: Uneventful            Sign Out: Well controlled pain   PONV: No   Neuro/Psych: Uneventful            Sign Out: Acceptable/Baseline neuro status   Airway/Respiratory: Uneventful            Sign Out: Acceptable/Baseline resp. status   CV/Hemodynamics: Uneventful            Sign Out: Acceptable CV status; No obvious hypovolemia; No obvious fluid overload   Other NRE: NONE   DID A NON-ROUTINE EVENT OCCUR? No           Last vitals:  Vitals Value Taken Time   /68 04/24/24 2230   Temp 36.3  C (97.4  F) 04/24/24 2137   Pulse 88 04/24/24 2237   Resp 16 04/24/24 2237   SpO2 91 % 04/24/24 2237   Vitals shown include unfiled device data.    Electronically Signed By: Alejandra Elizabeth MD  April 25, 2024  12:26 AM

## 2024-04-25 NOTE — PROGRESS NOTES
Colon and Rectal Surgery  Daily Progress Note    Subjective  Patient reports well. Pain controlled unless he moves in bed. Thirsty. Denies nausea.     Objective  Intake/Output last 24 hrs:  Temp:  [97.2  F (36.2  C)-99.9  F (37.7  C)] 97.9  F (36.6  C)  Pulse:  [66-89] 83  Resp:  [12-21] 16  BP: ()/() 107/66  SpO2:  [87 %-96 %] 92 %         Physical Exam:  General: awake, alert, in no acute distress  Respiratory: non-labored breathing  Abdomen: soft, appropriately tender, minimally-distended              Incisions: packed with iodine soaked gauze and covered.               Drains:  MARIA ISABEL with sersoang output   Ostomy:  Pink, with minimal output in the bag    Pertinent Labs  Lab Results: personally reviewed.  Lab Results   Component Value Date     04/24/2024     04/19/2024     04/18/2024     10/17/2019     09/14/2010     09/13/2010    CO2 21 04/24/2024    CO2 28 04/19/2024    CO2 22 04/18/2024    CO2 26 10/17/2019    CO2 27 09/14/2010    CO2 25 09/13/2010    BUN 17.2 04/24/2024    BUN 7.1 04/19/2024    BUN 15.3 04/18/2024    BUN 17 10/17/2019    BUN 16 09/14/2010    BUN 14 09/13/2010     Lab Results   Component Value Date    WBC 12.2 04/24/2024    WBC 11.5 04/17/2024    WBC 6.1 02/23/2024    WBC 6.6 10/17/2019    WBC 8.9 09/14/2010    WBC 9.8 09/13/2010    HGB 11.3 04/24/2024    HGB 9.9 04/19/2024    HGB 10.3 04/18/2024    HGB 17.2 10/17/2019    HGB 16.6 09/14/2010    HGB 16.3 09/13/2010    HCT 33.7 04/24/2024    HCT 44.7 04/17/2024    HCT 38.3 02/23/2024    HCT 49.0 10/17/2019    HCT 48.9 09/14/2010    HCT 48.2 09/13/2010    MCV 89 04/24/2024    MCV 90 04/17/2024    MCV 87 02/23/2024    MCV 94 10/17/2019    MCV 96 09/14/2010    MCV 97 09/13/2010     04/24/2024     04/18/2024     04/17/2024     10/17/2019     09/14/2010     09/13/2010       Assessment/Plan: This is a 68 year old male POD #1 s/p Ex lap, washout of abdomen, resection  of end to end colorectal anastomosis, and creation of end colostomy for anastomotic leak. Doing as expected postoperatively.     - Multimodal pain control with PCA   - Continue abx   - IS/DB  - Antiemetics PRN   - NPO, O for 1 cup of ice chips per shift, maintain IVF, AROBF  - discontinue  NGT  - discontinue  Zamora   - PT/OT, ambulate   - WOCN teaching       Discussed with Dr. Anthony Smith MD on 4/25/2024 at 7:54 AM   Colon and rectal surgery fellow

## 2024-04-25 NOTE — ANESTHESIA CARE TRANSFER NOTE
Patient: Castillo Alba    Procedure: Procedure(s):  EXPLORATORY LAPAROTOMY, RESECTION OF COLORECTAL ANASTAMOIS, ABDOMINAL WASHOUT, END COLOSTOMY       Diagnosis: Perforated bowel (H) [K63.1]  Diagnosis Additional Information: No value filed.    Anesthesia Type:   General     Note:    Oropharynx: oropharynx clear of all foreign objects and spontaneously breathing  Level of Consciousness: drowsy  Oxygen Supplementation: face mask  Level of Supplemental Oxygen (L/min / FiO2): 6  Independent Airway: airway patency satisfactory and stable  Dentition: dentition unchanged  Vital Signs Stable: post-procedure vital signs reviewed and stable  Report to RN Given: handoff report given  Patient transferred to: PACU  Comments: Patient breathing spontaneously.  Follows commands.  Suctioned and extubated.  Exchanging air well.  Transferred to PACU with 6L O2 via mask.  Monitors on.  VSS.  Patent IV.  Report and transfer of care to RN.    Handoff Report: Identifed the Patient, Identified the Reponsible Provider, Reviewed the pertinent medical history, Discussed the surgical course, Reviewed Intra-OP anesthesia mangement and issues during anesthesia, Set expectations for post-procedure period and Allowed opportunity for questions and acknowledgement of understanding      Vitals:  Vitals Value Taken Time   /64 04/24/24 2137   Temp     Pulse 73 04/24/24 2139   Resp 21 04/24/24 2139   SpO2 94 % 04/24/24 2139   Vitals shown include unfiled device data.    Electronically Signed By: VENANCIO Connell CRNA  April 24, 2024  9:40 PM

## 2024-04-25 NOTE — ANESTHESIA PROCEDURE NOTES
Airway       Patient location: [facility] - Operating Room or Procedural Area.       Procedure Start/Stop Times: 4/24/2024 6:33 PM and 4/24/2024 6:33 PM  Staff -        CRNA: Yamil Ricardo APRN CRNA       Performed By: CRNAIndications and Patient Condition       Indications for airway management: karen-procedural and airway protection       Induction type:RSI       Mask difficulty assessment: 0 - not attempted (RSI for airway protection)    Final Airway Details       Final airway type: endotracheal airway       Successful airway: ETT - single and Oral  Endotracheal Airway Details        ETT size (mm): 8.0       Cuffed: yes       Successful intubation technique: video laryngoscopy       VL Blade Size: Chang 4       Grade View of Cords: 1       Adjucts: stylet       Position: Center       Measured from: gums/teeth       Secured at (cm): 24       Bite block used: None    Post intubation assessment        Number of attempts at approach: 1       Number of other approaches attempted: 0       Secured with: tape       Ease of procedure: easy       Dentition: Intact and Unchanged    Medication(s) Administered   Medication Administration Time: 4/24/2024 6:33 PM    Additional Comments         RSI on cart for karen-procedural airway protection.     Chang 4.    8.0 mm ID endotracheal tube.

## 2024-04-25 NOTE — CONSULTS
"[facility]  WO Nurse Inpatient Ostomy Assessment     Assessment of new end Colostomy     Surgery date:  4-24-24  Surgeon:  Anthony  Procedure:  EXPLORATORY LAPAROTOMY, RESECTION OF COLORECTAL ANASTAMOIS, ABDOMINAL WASHOUT, END COLOSTOMY, f  Related diagnosis: for anastomotic leak     WO Assessment & Physical Exam:      Current status: Resting in bed.     Date of last photo: 4-25-24           Stoma location: LUQ  Stoma size:  1 3/8\" sligtly oval   Stoma appearance: pink, flush  Mucocutaneous junction:  not assessed today. Appliance intact   Peristomal  skin: not assessed. Appliance intact  Abdominal assessment:  distended and firm   Surgical site(s): dressing to site with old bloody drainage   NG still in place? Yes   Output: No fecal or flatus output noted   Pain: 4   Is patient still on a PCA? Yes    Pouching system in place at assessment: Post-op appliance intact     Psychosocial: Per wife pt did not want an ostomy       WOC Assessment          End colostomy: viable. Await return of GI function        Rose-stomal skin: no complications noted        MCJ: intact with sutures. NO local s/s infection        Pouching system: will need convexity.         Psychosocial: wife supportive and involved.         Education:  Initiate teaching on 4-26-24 @ 10:30am  with wife    Objective Data:     Patient history according to medical record:         Castillo Alba is a 68 year old male, seen at the request of Dr. Rubio, who presents with worsening abdominal pain, fatigue and rectal bleeding.  He underwent a robotic assisted left colectomy with colorectal anastomosis 1 week ago for management of complicated diverticulitis.  He did have a significantly inflamed colon at the time of surgery, as well as a small bowel loop that was adherent to the inflamed colon.  He was doing well until Monday when he developed worsening abdominal pain.  He also noticed blood in his stool.  Given that he did not have " significant improvement in his symptoms, he presented to the ER for evaluation.  On presentation, he was hemodynamically stable, with a mild leukocytosis, normal hemoglobin and otherwise relatively unremarkable labs.  He underwent a CT scan of the abdomen and pelvis which shows a large amount of free air in the upper abdomen and concern for a possible intra-abdominal fluid collection near his colorectal anastomosis.     Current Diet/Nutrition:   Orders Placed This Encounter      NPO for Medical/Clinical Reasons Except for: Meds, Ice Chips     Output:  I/O last 3 completed shifts:  In: 2889 [P.O.:80; I.V.:2809]  Out: 1585 [Urine:575; Emesis/NG output:250; Drains:60; Other:500; Blood:200]    Labs:   Recent Labs   Lab 04/25/24  0708 04/24/24  1349   ALBUMIN  --  3.2*   HGB 11.0* 11.3*   WBC 12.4* 12.2*       Jerald Risk Assessment:   Sensory Perception: 4-->no impairment  Moisture: 4-->rarely moist  Activity: 3-->walks occasionally  Mobility: 3-->slightly limited  Nutrition: 2-->probably inadequate  Friction and Shear: 3-->no apparent problem  Jerald Score: 19      WOC Interventions:     Patient's chart evaluated  Focus of today's visit: Introduction and explanation of WOC role. Initial assessment of stoma and appliance. Booklet /supplies @ bedside. Discussed with wife   Pouch changed Surgical post-op pouch left in place today. Intact, no leakage noted   Participant(s) in teaching session today: Pt only   Change made with ostomy management today: none  Supplies: placed @ bedside   Preparation for discharge: TBD  Registered for supply samples? TBD  Discussed plan of care with: Pt and wife     Kaya Beaulieu, RN, CWOCN  -Securely message with [employer] (more info) - can reach individually by name or search 'WOC Nurse' ([place]) to reach all current WOCs on duty.  -WOC Office Phone: [phone]

## 2024-04-25 NOTE — CONSULTS
SPIRITUAL HEALTH SERVICES Consult Note  [facility]  Gen Surg    Referral Source/Reason for Visit: [facility] consult request (routine)    Summary and Recommendations -  Did not assess for outside spiritual resources.  Pt identifies as [employer] and accepted offer of anointing by .  Recommend follow-up when pt is feeling more up to conversation.    Plan: Entered non-emergent request for anointing by . Will follow-up with pt prior to discharge from hospital.    Marin Garcia  Associate   [facility] Phone Line [phone]  Spiritual Health Pager [phone]    [facility] available 24/7 for emergent requests/referrals, either by paging the on-call  or by entering an ASAP/STAT consult in [facility], which will also page the on-call .    Assessment    Saw pt Castillo Alba per [facility] consult request..    Patient/Family Understanding of Illness and Goals of Care - Pt reports that he has been suffering from diverticulitis for a protracted period. He indicates that he has had some relief from pain since surgery last night.    Distress and Loss - Not discussed. Further assessment needed..    Strengths, Coping, and Resources - Not discussed. Further assessment needed.    Meaning, Beliefs, and Spirituality - Pt was raised and identifies as [employer]. Pt accepted offer of anointing by .

## 2024-04-25 NOTE — PROGRESS NOTES
04/25/24 1430   Appointment Info   Signing Clinician's Name / Credentials (OT) Hussain Farrar OTR/L   Living Environment   People in Home spouse;child(eleno), adult  (18 and 26 y.o sons)   Current Living Arrangements house   Home Accessibility stairs to enter home;stairs within home   Number of Stairs, Main Entrance 2   Number of Stairs, Within Home, Primary   (Full flight to basement that pt does not have to use.)   Transportation Anticipated family or friend will provide   Living Environment Comments Pt reports living in house w/ spouse and 2 adult sons. All needs met on one level. Walk in and tub shower w/ shower chair. Raised toilet seat.   Self-Care   Usual Activity Tolerance moderate   Current Activity Tolerance fair   Equipment Currently Used at Home none   Fall history within last six months no   Activity/Exercise/Self-Care Comment Pt reports being IND w/ all ADL's at baseline prior to admission.   Instrumental Activities of Daily Living (IADL)   Previous Responsibilities meal prep;housekeeping;laundry;medication management;finances;driving   IADL Comments Pt reports being IND w/ all IADL's at baseline prior to admission. Pt reports that they still drive.   General Information   Onset of Illness/Injury or Date of Surgery 04/24/24   Referring Physician Alejandra Cruz MD   Patient/Family Therapy Goal Statement (OT) Return to home.   Additional Occupational Profile Info/Pertinent History of Current Problem Pt here POD 1 from EXPLORATORY LAPAROTOMY, RESECTION OF COLORECTAL ANASTAMOIS, ABDOMINAL WASHOUT, END COLOSTOMY   Existing Precautions/Restrictions fall   Cognitive Status Examination   Orientation Status orientation to person, place and time   Visual Perception   Visual Impairment/Limitations corrective lenses for reading   Sensory   Sensory Quick Adds sensation intact   Pain Assessment   Patient Currently in Pain Yes, see Vital Sign flowsheet  (8/10 incision site)   Range of Motion Comprehensive    General Range of Motion no range of motion deficits identified   Strength Comprehensive (MMT)   Comment, General Manual Muscle Testing (MMT) Assessment Generalized weakness bilaterally   Bed Mobility   Bed Mobility supine-sit;sit-supine   Supine-Sit [place] (Bed Mobility) supervision   Sit-Supine [place] (Bed Mobility) minimum assist (75% patient effort)   Assistive Device (Bed Mobility) bed rails   Transfers   Transfers sit-stand transfer;toilet transfer   Sit-Stand Transfer   Sit-Stand [place] (Transfers) contact guard   Toilet Transfer   Type (Toilet Transfer) stand-sit;sit-stand   [place] Level (Toilet Transfer) contact guard   Activities of Daily Living   BADL Assessment/Intervention lower body dressing;grooming;toileting   Lower Body Dressing Assessment/Training   Comment, (Lower Body Dressing) Per clinical judgement   [place] Level (Lower Body Dressing) contact guard assist   Grooming Assessment/Training   [place] Level (Grooming) supervision   Comment, (Grooming) per clinical judgement   Toileting   [place] Level (Toileting) supervision   Clinical Impression   Criteria for Skilled Therapeutic Interventions Met (OT) Yes, treatment indicated   OT Diagnosis Decreased ADL independence and activity tolerance   Influenced by the following impairments Decreased ADL independence   OT Problem List-Impairments impacting ADL problems related to;activity tolerance impaired;pain   Assessment of Occupational Performance 1-3 Performance Deficits   Identified Performance Deficits TB dressing, home mgmt, toileting   Planned Therapy Interventions (OT) ADL retraining;home program guidelines;progressive activity/exercise;risk factor education   Clinical Decision Making Complexity (OT) problem focused assessment/low complexity   Risk & Benefits of therapy have been explained evaluation/treatment results reviewed;care plan/treatment goals reviewed;risks/benefits reviewed;current/potential  barriers reviewed;patient   OT Total Evaluation Time   OT Eval, Low Complexity Minutes (15741) 10   OT Goals   Therapy Frequency (OT) Daily   OT Predicted Duration/Target Date for Goal Attainment 05/01/24   OT Goals Hygiene/Grooming;Upper Body Dressing;Lower Body Dressing;Toilet Transfer/Toileting   OT: Hygiene/Grooming modified independent;while standing   OT: Upper Body Dressing Modified independent;including set-up/clothing retrieval   OT: Lower Body Dressing Modified independent;including set-up/clothing retrieval   OT: Toilet Transfer/Toileting Modified independent;toilet transfer;cleaning and garment management   Self-Care/Home Management   Self-Care/Home Mgmt/ADL, Compensatory, Meal Prep Minutes (33997) 20   Symptoms Noted During/After Treatment (Meal Preparation/Planning Training) increased pain;fatigue   Treatment Detail/Skilled Intervention Upon arrival to room, pt sitting EOB and requesting to get up to bedside commode; agreeable for assistance from OT. Pt declining to trial ambulating to bathroom d/t number of lines pt was hooked up to. Increased time spent for room set up. CGA sit > stand edge of bed and transfer to bedside commode w/ cues for safe hand placement w/ transfer. Pt requiring increased time to attempt to void and requested for writer to wait outside of door; supervision for karen cares while seated. Upon pt hitting call light; writer re-entered room and pt was back sitting EOB and requesting to return supine d/t pain. Min A sit > sup to help guide BLE's back onto bed and additional assist for line management. Pt able to assist w/ boosting self up towards head of bed. Pillows placed for comfort. Pt remained supine in bed w/ all needs met and bed alarm activated.   OT Discharge Planning   OT Plan LB dressing, progress mobility to bathroom, g/h sinkside   OT Discharge Recommendation (DC Rec) home with home care occupational therapy;home with assist   OT Rationale for DC Rec Pt is currently  functioning below baseline d/t increased pain and decreased activity tolerance. Pt lives in house w/ spouse and 2 adult sons who are available to assist. Pt reports being IND w/ all ADL's at baseline. Anticipate pt will be able to progress during IP stay to return to home w/ assist from family and home therapy. Will update d/c recommendation as appropriate.   OT Brief overview of current status See above   Total Session Time   Timed Code Treatment Minutes 20   Total Session Time (sum of timed and untimed services) 30

## 2024-04-25 NOTE — PLAN OF CARE
Goal Outcome Evaluation:      Plan of Care Reviewed With: patient    Overall Patient Progress: improvingOverall Patient Progress: improving    POD 1 from EXPLORATORY LAPAROTOMY, RESECTION OF COLORECTAL ANASTAMOIS, ABDOMINAL WASHOUT, END COLOSTOMY. A&Ox4. VSS on 4L NC. Pain managed with PCA dilaudid pump and schedule tylenol. NPO ex ice chips. NGT- LIS. Zamora in place with good output. Colostomy in place w/ no output. R MARIA ISABEL intact w/ bloody output. Hypo BS, no passing flatus. PIV infusing LR @ 125ml/hr. R PIV SL w/ int abx. Up A1 gt/w. Discharge pending.

## 2024-04-25 NOTE — BRIEF OP NOTE
[facility]    Brief Operative Note    Pre-operative diagnosis: Perforated bowel (H) [K63.1]  Post-operative diagnosis Anastomotic leak     Procedure: EXPLORATORY LAPAROTOMY, RESECTION OF COLORECTAL ANASTAMOIS, ABDOMINAL WASHOUT, END COLOSTOMY, N/A - Abdomen    Surgeon: Surgeons and Role:     * Alejandra Cruz MD - Primary     * Love Smith MD - Fellow - Assisting  Anesthesia: General   Estimated Blood Loss: 200 mL from 4/24/2024  6:25 PM to 4/24/2024  9:34 PM      Drains: Zeus-Anand in Pelvis     Specimens:   ID Type Source Tests Collected by Time Destination   1 : colorectal anastamosis Tissue Large Intestine, Colon SURGICAL PATHOLOGY EXAM Alejandra Cruz MD 4/24/2024  8:03 PM      Findings:   Large volume of clotted blood and some stool within the abdomen. Large hole posterior to the anastomosis. Changes of recent surgery with flimsy adhesions and inflammation. Peritonitis. Conduit otherwise well perfused and end stoma matured. Stoma flush with skin. Stump oversewed and tested for leak and that was negative.    Complications: None.  Implants: * No implants in log *    ADDENDUM:    PATIENT DATA  Indicate Y or N:  Home O2 No  Hemodialysis No  Transplant patient No  Cirrhosis No  Steroids in last 30 days No  Immunomodulators in last 30 days No  Anticoagulation at time of surgery No   List medication NA  Prior abdominal surgery Yes  Pelvic irradiation No    Albumin within 30 days if known 3.2  Hgb within 30 days if known 11.3  Cr within 30 days if known 0.89  Body mass index is 32.96 kg/m .    OR DATA  Emergent Yes   <24 hours Yes   <1 week No  Bowel Prep (Y or N) No  Antibiotics (Y or N) Yes  DVT prophylaxis    Heparin Yes   SCD Yes   None No  Drain Yes  ASA (1,2,3,4,5 if unknown) 2  OR time (min) 152  Stents No  Transfuse >/= 2U No  Anastomosis   Stapled No   Handsewn No  Leak Test (pos, neg, not done) Stump leak test negative

## 2024-04-26 ENCOUNTER — APPOINTMENT (OUTPATIENT)
Dept: PHYSICAL THERAPY | Facility: CLINIC | Age: 69
DRG: 329 | End: 2024-04-26
Attending: COLON & RECTAL SURGERY
Payer: COMMERCIAL

## 2024-04-26 ENCOUNTER — APPOINTMENT (OUTPATIENT)
Dept: GENERAL RADIOLOGY | Facility: CLINIC | Age: 69
DRG: 329 | End: 2024-04-26
Attending: PHYSICIAN ASSISTANT
Payer: COMMERCIAL

## 2024-04-26 ENCOUNTER — APPOINTMENT (OUTPATIENT)
Dept: GENERAL RADIOLOGY | Facility: CLINIC | Age: 69
DRG: 329 | End: 2024-04-26
Attending: COLON & RECTAL SURGERY
Payer: COMMERCIAL

## 2024-04-26 LAB
GLUCOSE BLDC GLUCOMTR-MCNC: 82 MG/DL (ref 70–99)
PATH REPORT.COMMENTS IMP SPEC: NORMAL
PATH REPORT.COMMENTS IMP SPEC: NORMAL
PATH REPORT.FINAL DX SPEC: NORMAL
PATH REPORT.GROSS SPEC: NORMAL
PATH REPORT.MICROSCOPIC SPEC OTHER STN: NORMAL
PATH REPORT.RELEVANT HX SPEC: NORMAL
PHOTO IMAGE: NORMAL

## 2024-04-26 PROCEDURE — 120N000001 HC R&B MED SURG/OB

## 2024-04-26 PROCEDURE — 97530 THERAPEUTIC ACTIVITIES: CPT | Mod: GP

## 2024-04-26 PROCEDURE — 250N000013 HC RX MED GY IP 250 OP 250 PS 637: Performed by: COLON & RECTAL SURGERY

## 2024-04-26 PROCEDURE — G0463 HOSPITAL OUTPT CLINIC VISIT: HCPCS

## 2024-04-26 PROCEDURE — 97161 PT EVAL LOW COMPLEX 20 MIN: CPT | Mod: GP

## 2024-04-26 PROCEDURE — 999N000065 XR ABDOMEN 1 VIEW

## 2024-04-26 PROCEDURE — 74018 RADEX ABDOMEN 1 VIEW: CPT

## 2024-04-26 PROCEDURE — 250N000011 HC RX IP 250 OP 636: Performed by: COLON & RECTAL SURGERY

## 2024-04-26 PROCEDURE — 97116 GAIT TRAINING THERAPY: CPT | Mod: GP

## 2024-04-26 PROCEDURE — 258N000003 HC RX IP 258 OP 636: Performed by: SURGERY

## 2024-04-26 PROCEDURE — 258N000003 HC RX IP 258 OP 636: Performed by: COLON & RECTAL SURGERY

## 2024-04-26 RX ORDER — MORPHINE SULFATE 2 MG/ML
2-4 INJECTION, SOLUTION INTRAMUSCULAR; INTRAVENOUS
Status: DISCONTINUED | OUTPATIENT
Start: 2024-04-26 | End: 2024-05-03

## 2024-04-26 RX ORDER — LIDOCAINE 4 G/G
2 PATCH TOPICAL
Status: DISCONTINUED | OUTPATIENT
Start: 2024-04-27 | End: 2024-05-07 | Stop reason: HOSPADM

## 2024-04-26 RX ORDER — DEXTROSE MONOHYDRATE, SODIUM CHLORIDE, AND POTASSIUM CHLORIDE 50; 1.49; 4.5 G/1000ML; G/1000ML; G/1000ML
INJECTION, SOLUTION INTRAVENOUS CONTINUOUS
Status: DISCONTINUED | OUTPATIENT
Start: 2024-04-26 | End: 2024-04-30

## 2024-04-26 RX ADMIN — VANCOMYCIN HYDROCHLORIDE 125 MG: 125 CAPSULE ORAL at 21:29

## 2024-04-26 RX ADMIN — VANCOMYCIN HYDROCHLORIDE 125 MG: 125 CAPSULE ORAL at 09:37

## 2024-04-26 RX ADMIN — PIPERACILLIN AND TAZOBACTAM 4.5 G: 4; .5 INJECTION, POWDER, FOR SOLUTION INTRAVENOUS at 14:02

## 2024-04-26 RX ADMIN — CYCLOBENZAPRINE 10 MG: 10 TABLET, FILM COATED ORAL at 09:37

## 2024-04-26 RX ADMIN — PIPERACILLIN AND TAZOBACTAM 4.5 G: 4; .5 INJECTION, POWDER, FOR SOLUTION INTRAVENOUS at 07:58

## 2024-04-26 RX ADMIN — DIATRIZOATE MEGLUMINE AND DIATRIZOATE SODIUM 120 ML: 660; 100 SOLUTION ORAL; RECTAL at 14:16

## 2024-04-26 RX ADMIN — FAMOTIDINE 20 MG: 10 INJECTION, SOLUTION INTRAVENOUS at 21:29

## 2024-04-26 RX ADMIN — POTASSIUM CHLORIDE, DEXTROSE MONOHYDRATE AND SODIUM CHLORIDE: 150; 5; 450 INJECTION, SOLUTION INTRAVENOUS at 18:18

## 2024-04-26 RX ADMIN — CYCLOBENZAPRINE 10 MG: 10 TABLET, FILM COATED ORAL at 21:29

## 2024-04-26 RX ADMIN — PIPERACILLIN AND TAZOBACTAM 4.5 G: 4; .5 INJECTION, POWDER, FOR SOLUTION INTRAVENOUS at 18:26

## 2024-04-26 RX ADMIN — HEPARIN SODIUM 5000 UNITS: 5000 INJECTION, SOLUTION INTRAVENOUS; SUBCUTANEOUS at 21:28

## 2024-04-26 RX ADMIN — MORPHINE SULFATE 4 MG: 2 INJECTION, SOLUTION INTRAMUSCULAR; INTRAVENOUS at 21:32

## 2024-04-26 RX ADMIN — MORPHINE SULFATE 4 MG: 2 INJECTION, SOLUTION INTRAMUSCULAR; INTRAVENOUS at 18:18

## 2024-04-26 RX ADMIN — ACETAMINOPHEN 975 MG: 325 TABLET, FILM COATED ORAL at 07:57

## 2024-04-26 RX ADMIN — ACETAMINOPHEN 975 MG: 325 TABLET, FILM COATED ORAL at 23:55

## 2024-04-26 RX ADMIN — SODIUM CHLORIDE, POTASSIUM CHLORIDE, SODIUM LACTATE AND CALCIUM CHLORIDE: 600; 310; 30; 20 INJECTION, SOLUTION INTRAVENOUS at 00:59

## 2024-04-26 RX ADMIN — KETOROLAC TROMETHAMINE 15 MG: 15 INJECTION, SOLUTION INTRAMUSCULAR; INTRAVENOUS at 16:33

## 2024-04-26 RX ADMIN — HEPARIN SODIUM 5000 UNITS: 5000 INJECTION, SOLUTION INTRAVENOUS; SUBCUTANEOUS at 14:06

## 2024-04-26 RX ADMIN — PIPERACILLIN AND TAZOBACTAM 4.5 G: 4; .5 INJECTION, POWDER, FOR SOLUTION INTRAVENOUS at 00:55

## 2024-04-26 RX ADMIN — FAMOTIDINE 20 MG: 10 INJECTION, SOLUTION INTRAVENOUS at 09:36

## 2024-04-26 RX ADMIN — HEPARIN SODIUM 5000 UNITS: 5000 INJECTION, SOLUTION INTRAVENOUS; SUBCUTANEOUS at 04:25

## 2024-04-26 RX ADMIN — ONDANSETRON 4 MG: 2 INJECTION INTRAMUSCULAR; INTRAVENOUS at 16:33

## 2024-04-26 ASSESSMENT — ACTIVITIES OF DAILY LIVING (ADL)
ADLS_ACUITY_SCORE: 30
ADLS_ACUITY_SCORE: 29
ADLS_ACUITY_SCORE: 27
ADLS_ACUITY_SCORE: 29
ADLS_ACUITY_SCORE: 27
ADLS_ACUITY_SCORE: 29
ADLS_ACUITY_SCORE: 27
ADLS_ACUITY_SCORE: 29
ADLS_ACUITY_SCORE: 27

## 2024-04-26 NOTE — PROGRESS NOTES
"[facility] Nurse Inpatient Ostomy Assessment     Assessment of new end Colostomy     Surgery date:  4-24-24  Surgeon:  Anthony  Procedure:  EXPLORATORY LAPAROTOMY, RESECTION OF COLORECTAL ANASTAMOIS, ABDOMINAL WASHOUT, END COLOSTOMY,   Related diagnosis: for anastomotic leak     [facility] Assessment & Physical Exam:      Current status: Resting in bed. C/o abdominal pain.  Listened but did not observe appliance change or look @        Stoma.     Date of last photo: 4-26-24                   Stoma location: LUQ  Stoma size:  1 1/2\" sligtly oval   Stoma appearance: pink, slightly protrudes  Mucocutaneous junction:  intact with sutures  Peristomal  skin: intact, no erythema.    Abdominal assessment:  distended and firm. Stoma sits in small divot   Surgical site(s): dressing to site with old bloody drainage   NG still in place? Yes   Output: No fecal or flatus output noted   Pain: 4   Is patient still on a PCA? Yes    Psychosocial: Per wife pt did not want an ostomy     WO Assessment          End colostomy: viable. Await return of GI function        Rose-stomal skin: no complications noted        MCJ: intact with sutures. NO local s/s infection        Pouching system: Need convexity.         Psychosocial: wife supportive and involved.         Education:Teaching initiated with wife and patient.     Objective Data:     Patient history according to medical record:         Castillo Alba is a 68 year old male, seen at the request of Dr. Rubio, who presents with worsening abdominal pain, fatigue and rectal bleeding.  He underwent a robotic assisted left colectomy with colorectal anastomosis 1 week ago for management of complicated diverticulitis.  He did have a significantly inflamed colon at the time of surgery, as well as a small bowel loop that was adherent to the inflamed colon.  He was doing well until Monday when he developed worsening abdominal pain.  He also noticed blood in his stool.  " Given that he did not have significant improvement in his symptoms, he presented to the ER for evaluation.  On presentation, he was hemodynamically stable, with a mild leukocytosis, normal hemoglobin and otherwise relatively unremarkable labs.  He underwent a CT scan of the abdomen and pelvis which shows a large amount of free air in the upper abdomen and concern for a possible intra-abdominal fluid collection near his colorectal anastomosis.     Current Diet/Nutrition:   Orders Placed This Encounter      NPO for Medical/Clinical Reasons Except for: Meds, Ice Chips     Output:  I/O last 3 completed shifts:  In: 2356 [I.V.:2356]  Out: 1448 [Urine:950; Emesis/NG output:450; Drains:48]    Labs:   Recent Labs   Lab 04/25/24  0708 04/24/24  1349   ALBUMIN  --  3.2*   HGB 11.0* 11.3*   WBC 12.4* 12.2*       Jerald Risk Assessment:   Sensory Perception: 4-->no impairment  Moisture: 4-->rarely moist  Activity: 3-->walks occasionally  Mobility: 3-->slightly limited  Nutrition: 2-->probably inadequate  Friction and Shear: 3-->no apparent problem  Jerald Score: 19      WOC Interventions:     Patient's chart evaluated  Focus of today's visit: Demo appliance change for wife. Discussed stoma construction, general ostomy management..Discussed closed vs drainable as management  option in future  Pouch changed: Lauro NI convex barrier with ring and drainable pouch  Participant(s) in teaching session today: Pt and wife  Supplies: placed @ bedside   Preparation for discharge: TBD  Registered for supply samples? TBD  Discussed plan of care with: Pt and wife   WOC follow-up: Will meet with wife/patient on Monday for teaching.     Kaya Beaulieu RN, CWOCN  -Securely message with [facility] [employer] (more info) - can reach individually by name or search 'WOC Nurse' ([place]) to reach all current WOCs on duty.  -WOC Office Phone: [phone]

## 2024-04-26 NOTE — PLAN OF CARE
Goal Outcome Evaluation:    POD 1 resection of colorectal anastamois, abd washout. A&Ox4. Midline incision drsg changed, WDL. VSS on RA.  NPO diet, one cup of ice allowed per shift. NGT hooked up to low intermittent sx. MARIA ISABEL drain on R side. Austin in place, will be removed tomorrow. Takes pills whole. Up with SBA. States pain at a 4, has scheduled meds and a PCA pump. Has needed extra education on how to use, forgets how to get it to work. Button education provided. Exercises done in bed today. Plan austin and NG removed tomorrow. Family at bedside.

## 2024-04-26 NOTE — PLAN OF CARE
Date & Time: 4/26/2024 7a-3p  Surgery/POD#: POD2 resection, abdominal washout, end colostomy  Behavior & Aggression: Green  Fall Risk: Yes  Orientation: A&Ox4  ABNL VS/O2: VSS, O2 93% on 5L humidified NC  ABNL Labs: WBC 12.4, Hgb 11.0, see results tab.  Pain Management: Scheduled Tylenol and PCA pump, encouraged use for activity, abd binder in place.  Bowel/Bladder: austin out, due to void, colostomy intact, no output/gas  Drains: NG to LIS, MARIA ISABEL w-minimal out  Wounds/Incisions: 5 lap sites, midline incision surgery changed this AM per pt, MARIA ISABEL site dressing CDI.  Diet: NPO +ice, gastrograffin challenge  Activity Level: moves in bed, up w 1-2+belt  Tests/Procedures: ABD x-Ray between 8-10 tonight  Anticipated DC Date: ROBF, diet and pain control  Significant Information:

## 2024-04-26 NOTE — PROGRESS NOTES
Notified provider about indwelling austin catheter discussed removal or continued need.    Did provider choose to remove indwelling austin catheter? NO    Provider's austin indication for keeping indwelling austin catheter: Indication for continued use: Retention    Is there an order for indwelling austin catheter? NO; it was supposed to be removed today, but provider changed mind and decided to remove tomorrow instead. Pt was very lethargic this am.    *If there is a plan to keep austin catheter in place at discharge daily notification with provider is not necessary, but please add a notation in the treatment team sticky note that the patient will be discharging with the catheter.

## 2024-04-26 NOTE — PROGRESS NOTES
[facility]: Appliance last changed on 5-6-24  Please reinforce general colostomy management  Reinforce appliance change with wife  Reinforce emptying drainable pouch and/or changing of closed pouches   Pt has supplies for discharge     Surgery date:  4/24/2024   Surgeon:  Anthony  Procedure: EXPLORATORY LAPAROTOMY, RESECTION OF COLORECTAL ANASTAMOIS, ABDOMINAL WASHOUT, END COLOSTOMY, f    Related diagnosis: anastomotic leak following Lt colectomy with anastomosis for complicated diverticulitis     1. Change barrier  1-2  times a week and as needed for leakage.    2. Empty and/or change change closed pouch  when 1/3 full of stool/gas.  3. Will not harm appliance to get wet during bathing. Blow dry (on cool setting) cloth backing and tape on appliance       Following shower.   4. Initially eat 6 small meals/day. No long-term dietary restrictions related to colostomy.  5. Drink plenty of fluids.  6. Always carry an emergency pouch - can prepare it ahead of time (cut out opening and apply ring).   7. If concerned about odor use an odor eliminator (ie. Febreeze) in your bathroom prior to emptying/changing the appliance.  8. No heavy lifting, no sit-ups but walk frequently.  9. OK to take stairs.     Supplies:  [employer] (20 appliance changes / month)  Barrier: Conex  57mm CTF with tape               #88173   5/bx = 4bx/month   Pouch: Drainable ultra clear lock n roll pouch   #53225       10bx = 2bx/month  Pouch: opaque closed pouch, no filter         #06439        60bx = 1bx/month  Adapt ring:                                                        #8805         10/bx = 2bx/month      Procedure:                     #  Draw and Cut pattern in barrier following pattern. Cut to outside of drawn line  Remove plastic backing  Fold back edge of paper that covers the tape to create a tab (this makes paper removal easier in Step #8  )  Open ring and remove wax paper from each side. Stretch ring open to size of opening on back  "of barrier.  Place ring around opening, Press down with fingers.   Set barrier aside  Gently remove appliance from aroiund Colostomy. Discard.  8.  Gently clean skin around stoma with water. Pat skin dry.   7.   Press barrier down to skin   8. .  Pull on\"tab\"  to remove paper from the tape. . Press tape to down to skin  12. Snap of pouch of choice        Online ostomy resources: [employer] website, has good videos - https://www.[employer]/en/ostomycare/educationaltools   -See also [email or web address]; [email or web address].org    Follow-up as needed (after home care is done):      CWOCNs (ostomy nurses)  Clinic room # 743 in [facility]  Office phone # [phone] (Mon - Fri) - call for any questions or concerns     "

## 2024-04-26 NOTE — PROGRESS NOTES
COLON & RECTAL SURGERY  PROGRESS NOTE    April 26, 2024  Post-op Day # 2    SUBJECTIVE:  Pain overall stable, not using PCA much because it makes him feel odd. No colostomy output. No n/v. NGT with 450cc out yesterday, still bilious. Decent UOP. Walked to bathroom yesterday. AVSS, on 5L O2.     OBJECTIVE:  Temp:  [97.9  F (36.6  C)-98.8  F (37.1  C)] 98.4  F (36.9  C)  Pulse:  [75-79] 75  Resp:  [16] 16  BP: (107-124)/(60-70) 124/70  SpO2:  [92 %-97 %] 92 %    Intake/Output Summary (Last 24 hours) at 4/26/2024 1045  Last data filed at 4/26/2024 0500  Gross per 24 hour   Intake 2353 ml   Output 1448 ml   Net 905 ml       GENERAL:  Awake, alert, no acute distress  HEAD: Normocephalic atraumatic  SCLERA: Anicteric  EXTREMITIES: Warm and well perfused  ABDOMEN:  Soft, appropriately tender, distended. No guarding, rigidity, or peritoneal signs. Stoma pink, no gas or stool in bag. MARIA ISABEL serosang.   INCISION:  C/d/i    LABS:  Lab Results   Component Value Date    WBC 12.4 04/25/2024    WBC 6.6 10/17/2019     Lab Results   Component Value Date    HGB 11.0 04/25/2024    HGB 17.2 10/17/2019     Lab Results   Component Value Date    HCT 32.2 04/25/2024    HCT 49.0 10/17/2019     Lab Results   Component Value Date     04/25/2024     10/17/2019     Last Basic Metabolic Panel:  Lab Results   Component Value Date     04/25/2024     10/17/2019      Lab Results   Component Value Date    POTASSIUM 4.2 04/25/2024    POTASSIUM 3.8 10/17/2019     Lab Results   Component Value Date    CHLORIDE 101 04/25/2024    CHLORIDE 106 10/17/2019     Lab Results   Component Value Date    LENORE 8.0 04/25/2024    LENORE 9.3 10/17/2019     Lab Results   Component Value Date    CO2 25 04/25/2024    CO2 26 10/17/2019     Lab Results   Component Value Date    BUN 21.5 04/25/2024    BUN 17 10/17/2019     Lab Results   Component Value Date    CR 0.82 04/25/2024    CR 0.84 10/17/2019     Lab Results   Component Value Date      04/25/2024     04/25/2024     10/17/2019       ASSESSMENT/PLAN: 68 year old man POD#2 s/p exploratory laparotomy with takedown of colorectal anastomosis and creation of an end colostomy for management of an anastomotic leak due to a pelvic hematoma.     - NPO, ok for ice chips and small sips of water for comfort  - Continue NGT. Will do gastrografin challenge today (see order)  - Lozenges for sore throat  - Continue Abx for 5 days post op, prophylactic vanco given h/o c diff  - Continue IVF  - PCA for pain  - Can remove Zamora today  - OOB, ambulate  - WOCN for stoma teaching  - PT/OT   - SQH for ppx    Discussed with Dr. Cruz.    For questions/paging, please contact the CRS office at [phone].    Minesh Doherty PA-C  Colorectal Physician Assistant    [facility]  [address]  [place] [address]  T: [phone]  F: [phone]

## 2024-04-26 NOTE — PLAN OF CARE
Date & Time: 4/25/24, 2136-1859  Surgery/POD#: POD1/2 resection of colorectal anastamois, abdominal washout, end colostomy  Behavior & Aggression: Green  Fall Risk: Yes  Orientation: A&Ox4  ABNL VS/O2: VSS, O2 94% on 5L humidified NC  ABNL Labs: WBC 12.4, Hgb 11.0, see results tab.  Pain Management: Scheduled Tylenol and PCA pump, education provided. Ice to R abdomen site, abd binder in place.  Bowel/Bladder: Zamora in place, colostomy in place w/ no flatus yet and scant sanguinous sweat.  Drains: NG to low-intermittent with 450mL bile OP, R abd MARIA ISABEL to bulb suction with 10mL sang OP.  Wounds/Incisions: 5 lap sites, midline incision dry gauze CDI packing in place w/ dried sang drainage, MARIA ISABEL site dressing CDI.  Diet: NPO  Activity Level: Not OOB this shift, instructed to perform micro-turns in bed  Tests/Procedures: N/A  Anticipated DC Date: Pending return of bowel function

## 2024-04-27 ENCOUNTER — APPOINTMENT (OUTPATIENT)
Dept: OCCUPATIONAL THERAPY | Facility: CLINIC | Age: 69
DRG: 329 | End: 2024-04-27
Payer: COMMERCIAL

## 2024-04-27 ENCOUNTER — APPOINTMENT (OUTPATIENT)
Dept: PHYSICAL THERAPY | Facility: CLINIC | Age: 69
DRG: 329 | End: 2024-04-27
Payer: COMMERCIAL

## 2024-04-27 LAB
ANION GAP SERPL CALCULATED.3IONS-SCNC: 9 MMOL/L (ref 7–15)
BUN SERPL-MCNC: 16.4 MG/DL (ref 8–23)
CALCIUM SERPL-MCNC: 8.3 MG/DL (ref 8.8–10.2)
CHLORIDE SERPL-SCNC: 101 MMOL/L (ref 98–107)
CREAT SERPL-MCNC: 0.76 MG/DL (ref 0.67–1.17)
DEPRECATED HCO3 PLAS-SCNC: 27 MMOL/L (ref 22–29)
EGFRCR SERPLBLD CKD-EPI 2021: >90 ML/MIN/1.73M2
ERYTHROCYTE [DISTWIDTH] IN BLOOD BY AUTOMATED COUNT: 16.7 % (ref 10–15)
GLUCOSE SERPL-MCNC: 118 MG/DL (ref 70–99)
HCT VFR BLD AUTO: 30.5 % (ref 40–53)
HGB BLD-MCNC: 10.1 G/DL (ref 13.3–17.7)
MAGNESIUM SERPL-MCNC: 2.2 MG/DL (ref 1.7–2.3)
MCH RBC QN AUTO: 29 PG (ref 26.5–33)
MCHC RBC AUTO-ENTMCNC: 33.1 G/DL (ref 31.5–36.5)
MCV RBC AUTO: 88 FL (ref 78–100)
PLATELET # BLD AUTO: 455 10E3/UL (ref 150–450)
POTASSIUM SERPL-SCNC: 3.8 MMOL/L (ref 3.4–5.3)
RBC # BLD AUTO: 3.48 10E6/UL (ref 4.4–5.9)
SODIUM SERPL-SCNC: 137 MMOL/L (ref 135–145)
WBC # BLD AUTO: 12 10E3/UL (ref 4–11)

## 2024-04-27 PROCEDURE — 97535 SELF CARE MNGMENT TRAINING: CPT | Mod: GO

## 2024-04-27 PROCEDURE — 258N000003 HC RX IP 258 OP 636: Performed by: SURGERY

## 2024-04-27 PROCEDURE — 97530 THERAPEUTIC ACTIVITIES: CPT | Mod: GP | Performed by: PHYSICAL THERAPIST

## 2024-04-27 PROCEDURE — 85027 COMPLETE CBC AUTOMATED: CPT | Performed by: COLON & RECTAL SURGERY

## 2024-04-27 PROCEDURE — 80048 BASIC METABOLIC PNL TOTAL CA: CPT | Performed by: COLON & RECTAL SURGERY

## 2024-04-27 PROCEDURE — 250N000013 HC RX MED GY IP 250 OP 250 PS 637: Performed by: PHYSICIAN ASSISTANT

## 2024-04-27 PROCEDURE — 97116 GAIT TRAINING THERAPY: CPT | Mod: GP | Performed by: PHYSICAL THERAPIST

## 2024-04-27 PROCEDURE — 36415 COLL VENOUS BLD VENIPUNCTURE: CPT | Performed by: COLON & RECTAL SURGERY

## 2024-04-27 PROCEDURE — 83735 ASSAY OF MAGNESIUM: CPT | Performed by: SURGERY

## 2024-04-27 PROCEDURE — 120N000001 HC R&B MED SURG/OB

## 2024-04-27 PROCEDURE — 250N000013 HC RX MED GY IP 250 OP 250 PS 637: Performed by: COLON & RECTAL SURGERY

## 2024-04-27 PROCEDURE — 250N000011 HC RX IP 250 OP 636: Performed by: COLON & RECTAL SURGERY

## 2024-04-27 PROCEDURE — 258N000003 HC RX IP 258 OP 636: Performed by: COLON & RECTAL SURGERY

## 2024-04-27 RX ADMIN — POTASSIUM CHLORIDE, DEXTROSE MONOHYDRATE AND SODIUM CHLORIDE: 150; 5; 450 INJECTION, SOLUTION INTRAVENOUS at 15:16

## 2024-04-27 RX ADMIN — PIPERACILLIN AND TAZOBACTAM 4.5 G: 4; .5 INJECTION, POWDER, FOR SOLUTION INTRAVENOUS at 18:43

## 2024-04-27 RX ADMIN — MORPHINE SULFATE 2 MG: 2 INJECTION, SOLUTION INTRAMUSCULAR; INTRAVENOUS at 15:12

## 2024-04-27 RX ADMIN — SODIUM CHLORIDE, POTASSIUM CHLORIDE, SODIUM LACTATE AND CALCIUM CHLORIDE 1000 ML: 600; 310; 30; 20 INJECTION, SOLUTION INTRAVENOUS at 02:00

## 2024-04-27 RX ADMIN — HEPARIN SODIUM 5000 UNITS: 5000 INJECTION, SOLUTION INTRAVENOUS; SUBCUTANEOUS at 12:10

## 2024-04-27 RX ADMIN — PIPERACILLIN AND TAZOBACTAM 4.5 G: 4; .5 INJECTION, POWDER, FOR SOLUTION INTRAVENOUS at 12:10

## 2024-04-27 RX ADMIN — FAMOTIDINE 20 MG: 10 INJECTION, SOLUTION INTRAVENOUS at 08:49

## 2024-04-27 RX ADMIN — POTASSIUM CHLORIDE, DEXTROSE MONOHYDRATE AND SODIUM CHLORIDE: 150; 5; 450 INJECTION, SOLUTION INTRAVENOUS at 06:05

## 2024-04-27 RX ADMIN — CYCLOBENZAPRINE 10 MG: 10 TABLET, FILM COATED ORAL at 15:10

## 2024-04-27 RX ADMIN — PIPERACILLIN AND TAZOBACTAM 4.5 G: 4; .5 INJECTION, POWDER, FOR SOLUTION INTRAVENOUS at 00:12

## 2024-04-27 RX ADMIN — LIDOCAINE 2 PATCH: 4 PATCH TOPICAL at 08:47

## 2024-04-27 RX ADMIN — MORPHINE SULFATE 4 MG: 2 INJECTION, SOLUTION INTRAMUSCULAR; INTRAVENOUS at 17:03

## 2024-04-27 RX ADMIN — ONDANSETRON 4 MG: 2 INJECTION INTRAMUSCULAR; INTRAVENOUS at 15:20

## 2024-04-27 RX ADMIN — VANCOMYCIN HYDROCHLORIDE 125 MG: 125 CAPSULE ORAL at 08:49

## 2024-04-27 RX ADMIN — CYCLOBENZAPRINE 10 MG: 10 TABLET, FILM COATED ORAL at 08:49

## 2024-04-27 RX ADMIN — HEPARIN SODIUM 5000 UNITS: 5000 INJECTION, SOLUTION INTRAVENOUS; SUBCUTANEOUS at 05:51

## 2024-04-27 RX ADMIN — FAMOTIDINE 20 MG: 10 INJECTION, SOLUTION INTRAVENOUS at 20:15

## 2024-04-27 RX ADMIN — BENZOCAINE 6 MG-MENTHOL 10 MG LOZENGES 1 LOZENGE: at 15:16

## 2024-04-27 RX ADMIN — VANCOMYCIN HYDROCHLORIDE 125 MG: 125 CAPSULE ORAL at 21:00

## 2024-04-27 RX ADMIN — MORPHINE SULFATE 2 MG: 2 INJECTION, SOLUTION INTRAMUSCULAR; INTRAVENOUS at 01:50

## 2024-04-27 RX ADMIN — HEPARIN SODIUM 5000 UNITS: 5000 INJECTION, SOLUTION INTRAVENOUS; SUBCUTANEOUS at 20:15

## 2024-04-27 RX ADMIN — PROCHLORPERAZINE EDISYLATE 5 MG: 5 INJECTION INTRAMUSCULAR; INTRAVENOUS at 16:55

## 2024-04-27 RX ADMIN — MORPHINE SULFATE 4 MG: 2 INJECTION, SOLUTION INTRAMUSCULAR; INTRAVENOUS at 20:15

## 2024-04-27 RX ADMIN — ONDANSETRON 4 MG: 2 INJECTION INTRAMUSCULAR; INTRAVENOUS at 20:16

## 2024-04-27 RX ADMIN — PIPERACILLIN AND TAZOBACTAM 4.5 G: 4; .5 INJECTION, POWDER, FOR SOLUTION INTRAVENOUS at 06:02

## 2024-04-27 RX ADMIN — ACETAMINOPHEN 975 MG: 325 TABLET, FILM COATED ORAL at 06:02

## 2024-04-27 ASSESSMENT — ACTIVITIES OF DAILY LIVING (ADL)
ADLS_ACUITY_SCORE: 29

## 2024-04-27 NOTE — PROGRESS NOTES
Date & Time: 4620-3040  Surgery/POD#: POD 3 Resection of Colorectal Anastomosis, Abdominal Washout, End Colostomy   Behavior & Aggression: Green  Fall Risk: Yes  Orientation:A/Ox4  ABNL VS/O2:  ABNL Labs: NA  Pain Management:PRN Morphine   Bowel/Bladder: Voiding   Drains: Maria Isabel drain, colostomy, NGT  Wounds/incisions:  Midline, lap sites  Diet:NPO except Ice chips  Activity Level: 1 assist Gb/WK  Anticipated  DC Date: Pending ROBF  Significant Information: Not passing gas, nausea managed with PRN Zofran, abdominal dressing changed, MARIA ISABEL dressing reinforced. NGT to LIS with green output.

## 2024-04-27 NOTE — PLAN OF CARE
Goal Outcome Evaluation:                    Summary POD2 POD 2 s/p exploratory laparotomy with takedown of colorectal anastomosis and creation of an end colostomy for management of an anastomotic leak due to a pelvic hematoma.     4/26/2024 8688-0672    Orientation A & O X 4    Vitals/Tele VSS on  5 L [id number]     Pain managed with morphine and scheduled flexeril, pt became nauseous from pain pump medication dilaudid, so was switched to morphine, received PRN zofran for nausea with some relief    IV Access/drains PIV infusing 100ml/h D51/2NK+, and intermittent ABX, gillian iain, ng to intermittent suction      Diet Ice chips + meds     Mobility pt was tired and in pain,was not oob but repositioned self in the bed     GI/ using bedside urinal perry color urine, no output no coelosomy     Wound/Skin 5 lap sites, midline incision packing removed and open to air, GILLIAN site dressing CDI. Abdominal binder for comfort but pt has not wanted on     Consults PT/OT     Discharge Plan Pending     See Flow sheets for assessment

## 2024-04-27 NOTE — PROGRESS NOTES
Colon and Rectal Surgery Progress Note          Assessment and Plan:     POD #3 take back to OR for anastomotic leak due to pelvic hematoma    Awaiting return of bowel function.  Still has \arge NG output.  Would not recommend removal at this time.  May need TPN if remains NPO next 2 days.    Cont pelvic drain.      Julio César Joya MD FACS FASCRS  Colorectal Surgeon       [facility]  [address], [address]  [place] [address]  T: [phone]  F: [phone]  Pager: [phone]  [email or web address]                 Interval History:     Feels about the same.  He has gotten up to walk              Physical Exam:   Vitals were reviewed  Patient Vitals for the past 24 hrs:   BP Temp Temp src Pulse Resp SpO2   04/27/24 0858 -- -- -- -- -- 95 %   04/27/24 0857 -- -- -- -- -- 92 %   04/27/24 0700 129/66 98.2  F (36.8  C) Oral 68 16 94 %   04/26/24 2337 123/64 98.5  F (36.9  C) Oral 73 16 92 %   04/26/24 2132 -- -- -- 68 16 92 %   04/26/24 1646 110/61 98.5  F (36.9  C) Oral -- -- 91 %         Intake/Output Summary (Last 24 hours) at 4/27/2024 0912  Last data filed at 4/27/2024 0605  Gross per 24 hour   Intake 7 ml   Output 2520 ml   Net -2513 ml       Head - Nomocephalic atraumatic  Sclera anicteric  Extremities warm and well perfused  Lungs - breathing non labored,   Abdomen - soft, mildly distended.  Stoma pink with some gas in the bag, but minimal, wound OK.  MARIA ISBAEL output bloody  Rectal exam - deferred  Skin - no rash  Psych - affect appropriate  Neuro - no focal deficits             Data:   All laboratory and imaging data in the past 24 hours reviewed    CBC  Lab Results   Component Value Date    WBC 12.0 (H) 04/27/2024    WBC 12.4 (H) 04/25/2024    WBC 12.2 (H) 04/24/2024    HGB 10.1 (L) 04/27/2024    HGB 11.0 (L) 04/25/2024    HGB 11.3 (L) 04/24/2024    HCT 30.5 (L) 04/27/2024    HCT 32.2 (L) 04/25/2024    HCT 33.7 (L) 04/24/2024     (H) 04/27/2024     04/25/2024      04/24/2024       BMP  Recent Labs   Lab Test 04/27/24  0707 04/26/24  1717 04/25/24  0708     --  136   POTASSIUM 3.8  --  4.2   CHLORIDE 101  --  101   CO2 27  --  25   ANIONGAP 9  --  10   * 82 132*   BUN 16.4  --  21.5   CR 0.76  --  0.82   LENORE 8.3*  --  8.0*       Liver Function Studies -   Recent Labs   Lab Test 04/24/24  1349   PROTTOTAL 6.5   ALBUMIN 3.2*   BILITOTAL 1.7*   ALKPHOS 67   AST 13   ALT 8       New imaging data reviewed: no    Total time spent in counseling, direct patient care, coordination of care, and review of data 15 min

## 2024-04-27 NOTE — PLAN OF CARE
[id number]. A&Ox4, VSS on 5L humidified O2. Patient pain is controlled with morphine x1 and tylenol. Up with Ax1 and wk, ambulated in room. NG to lIS, green output. Denies nausea. MARIA ISABEL with minimal output. 5 laps, midline staples ROBBI. Colostomy intact with no output. Gastrograffin challenge completed. Patient not producing much urine, on-call paged, straight cath and bolus of LR ordered. Discharge pending ROBF. NPO and ice chips. Continue to monitor.

## 2024-04-27 NOTE — PROVIDER NOTIFICATION
Colorectal on-call paged for patient having low urine output.     Orders received: straight cath, if less than 500ml of urine then give bolus.

## 2024-04-28 ENCOUNTER — APPOINTMENT (OUTPATIENT)
Dept: PHYSICAL THERAPY | Facility: CLINIC | Age: 69
DRG: 329 | End: 2024-04-28
Payer: COMMERCIAL

## 2024-04-28 ENCOUNTER — APPOINTMENT (OUTPATIENT)
Dept: OCCUPATIONAL THERAPY | Facility: CLINIC | Age: 69
DRG: 329 | End: 2024-04-28
Payer: COMMERCIAL

## 2024-04-28 LAB
MAGNESIUM SERPL-MCNC: 2.1 MG/DL (ref 1.7–2.3)
PHOSPHATE SERPL-MCNC: 1.9 MG/DL (ref 2.5–4.5)
POTASSIUM SERPL-SCNC: 3.7 MMOL/L (ref 3.4–5.3)

## 2024-04-28 PROCEDURE — 250N000009 HC RX 250: Performed by: COLON & RECTAL SURGERY

## 2024-04-28 PROCEDURE — 97535 SELF CARE MNGMENT TRAINING: CPT | Mod: GO

## 2024-04-28 PROCEDURE — 36415 COLL VENOUS BLD VENIPUNCTURE: CPT | Performed by: COLON & RECTAL SURGERY

## 2024-04-28 PROCEDURE — 84132 ASSAY OF SERUM POTASSIUM: CPT | Performed by: COLON & RECTAL SURGERY

## 2024-04-28 PROCEDURE — 84100 ASSAY OF PHOSPHORUS: CPT | Performed by: COLON & RECTAL SURGERY

## 2024-04-28 PROCEDURE — 97530 THERAPEUTIC ACTIVITIES: CPT | Mod: GP | Performed by: PHYSICAL THERAPIST

## 2024-04-28 PROCEDURE — 97116 GAIT TRAINING THERAPY: CPT | Mod: GP | Performed by: PHYSICAL THERAPIST

## 2024-04-28 PROCEDURE — 83735 ASSAY OF MAGNESIUM: CPT | Performed by: COLON & RECTAL SURGERY

## 2024-04-28 PROCEDURE — 250N000013 HC RX MED GY IP 250 OP 250 PS 637: Performed by: PHYSICIAN ASSISTANT

## 2024-04-28 PROCEDURE — 250N000013 HC RX MED GY IP 250 OP 250 PS 637: Performed by: COLON & RECTAL SURGERY

## 2024-04-28 PROCEDURE — 250N000011 HC RX IP 250 OP 636: Performed by: COLON & RECTAL SURGERY

## 2024-04-28 PROCEDURE — 258N000003 HC RX IP 258 OP 636: Performed by: COLON & RECTAL SURGERY

## 2024-04-28 PROCEDURE — 120N000001 HC R&B MED SURG/OB

## 2024-04-28 PROCEDURE — 97530 THERAPEUTIC ACTIVITIES: CPT | Mod: GO

## 2024-04-28 RX ORDER — LIDOCAINE 40 MG/G
CREAM TOPICAL
Status: ACTIVE | OUTPATIENT
Start: 2024-04-28 | End: 2024-05-01

## 2024-04-28 RX ORDER — DEXTROSE MONOHYDRATE 25 G/50ML
25-50 INJECTION, SOLUTION INTRAVENOUS
Status: DISCONTINUED | OUTPATIENT
Start: 2024-04-28 | End: 2024-05-07 | Stop reason: HOSPADM

## 2024-04-28 RX ORDER — DEXTROSE MONOHYDRATE 100 MG/ML
INJECTION, SOLUTION INTRAVENOUS CONTINUOUS PRN
Status: DISCONTINUED | OUTPATIENT
Start: 2024-04-28 | End: 2024-05-03

## 2024-04-28 RX ORDER — NICOTINE POLACRILEX 4 MG
15-30 LOZENGE BUCCAL
Status: DISCONTINUED | OUTPATIENT
Start: 2024-04-28 | End: 2024-05-07 | Stop reason: HOSPADM

## 2024-04-28 RX ADMIN — FAMOTIDINE 20 MG: 10 INJECTION, SOLUTION INTRAVENOUS at 08:48

## 2024-04-28 RX ADMIN — MORPHINE SULFATE 2 MG: 2 INJECTION, SOLUTION INTRAMUSCULAR; INTRAVENOUS at 08:45

## 2024-04-28 RX ADMIN — SODIUM PHOSPHATE, MONOBASIC, MONOHYDRATE AND SODIUM PHOSPHATE, DIBASIC, ANHYDROUS 15 MMOL: 142; 276 INJECTION, SOLUTION INTRAVENOUS at 18:34

## 2024-04-28 RX ADMIN — ACETAMINOPHEN 650 MG: 325 TABLET, FILM COATED ORAL at 19:55

## 2024-04-28 RX ADMIN — PIPERACILLIN AND TAZOBACTAM 4.5 G: 4; .5 INJECTION, POWDER, FOR SOLUTION INTRAVENOUS at 00:41

## 2024-04-28 RX ADMIN — MORPHINE SULFATE 4 MG: 2 INJECTION, SOLUTION INTRAMUSCULAR; INTRAVENOUS at 00:24

## 2024-04-28 RX ADMIN — MORPHINE SULFATE 4 MG: 2 INJECTION, SOLUTION INTRAMUSCULAR; INTRAVENOUS at 12:56

## 2024-04-28 RX ADMIN — PROCHLORPERAZINE EDISYLATE 5 MG: 5 INJECTION INTRAMUSCULAR; INTRAVENOUS at 13:02

## 2024-04-28 RX ADMIN — HEPARIN SODIUM 5000 UNITS: 5000 INJECTION, SOLUTION INTRAVENOUS; SUBCUTANEOUS at 04:50

## 2024-04-28 RX ADMIN — KETOROLAC TROMETHAMINE 15 MG: 15 INJECTION, SOLUTION INTRAMUSCULAR; INTRAVENOUS at 03:02

## 2024-04-28 RX ADMIN — MORPHINE SULFATE 4 MG: 2 INJECTION, SOLUTION INTRAMUSCULAR; INTRAVENOUS at 02:21

## 2024-04-28 RX ADMIN — FAMOTIDINE 20 MG: 10 INJECTION, SOLUTION INTRAVENOUS at 21:33

## 2024-04-28 RX ADMIN — PIPERACILLIN AND TAZOBACTAM 4.5 G: 4; .5 INJECTION, POWDER, FOR SOLUTION INTRAVENOUS at 06:29

## 2024-04-28 RX ADMIN — PIPERACILLIN AND TAZOBACTAM 4.5 G: 4; .5 INJECTION, POWDER, FOR SOLUTION INTRAVENOUS at 12:04

## 2024-04-28 RX ADMIN — CYCLOBENZAPRINE 10 MG: 10 TABLET, FILM COATED ORAL at 21:33

## 2024-04-28 RX ADMIN — BENZOCAINE 6 MG-MENTHOL 10 MG LOZENGES 1 LOZENGE: at 15:04

## 2024-04-28 RX ADMIN — HEPARIN SODIUM 5000 UNITS: 5000 INJECTION, SOLUTION INTRAVENOUS; SUBCUTANEOUS at 12:04

## 2024-04-28 RX ADMIN — CYCLOBENZAPRINE 10 MG: 10 TABLET, FILM COATED ORAL at 08:48

## 2024-04-28 RX ADMIN — MORPHINE SULFATE 4 MG: 2 INJECTION, SOLUTION INTRAMUSCULAR; INTRAVENOUS at 19:56

## 2024-04-28 RX ADMIN — CYCLOBENZAPRINE 10 MG: 10 TABLET, FILM COATED ORAL at 01:17

## 2024-04-28 RX ADMIN — CYCLOBENZAPRINE 10 MG: 10 TABLET, FILM COATED ORAL at 15:04

## 2024-04-28 RX ADMIN — SODIUM PHOSPHATE, MONOBASIC, MONOHYDRATE AND SODIUM PHOSPHATE, DIBASIC, ANHYDROUS 15 MMOL: 142; 276 INJECTION, SOLUTION INTRAVENOUS at 22:19

## 2024-04-28 RX ADMIN — KETOROLAC TROMETHAMINE 15 MG: 15 INJECTION, SOLUTION INTRAMUSCULAR; INTRAVENOUS at 15:04

## 2024-04-28 RX ADMIN — POTASSIUM CHLORIDE, DEXTROSE MONOHYDRATE AND SODIUM CHLORIDE: 150; 5; 450 INJECTION, SOLUTION INTRAVENOUS at 15:09

## 2024-04-28 RX ADMIN — VANCOMYCIN HYDROCHLORIDE 125 MG: 125 CAPSULE ORAL at 08:48

## 2024-04-28 RX ADMIN — VANCOMYCIN HYDROCHLORIDE 125 MG: 125 CAPSULE ORAL at 21:33

## 2024-04-28 RX ADMIN — LIDOCAINE 2 PATCH: 4 PATCH TOPICAL at 08:45

## 2024-04-28 RX ADMIN — BENZOCAINE 6 MG-MENTHOL 10 MG LOZENGES 1 LOZENGE: at 16:07

## 2024-04-28 RX ADMIN — HEPARIN SODIUM 5000 UNITS: 5000 INJECTION, SOLUTION INTRAVENOUS; SUBCUTANEOUS at 19:56

## 2024-04-28 RX ADMIN — POTASSIUM CHLORIDE, DEXTROSE MONOHYDRATE AND SODIUM CHLORIDE: 150; 5; 450 INJECTION, SOLUTION INTRAVENOUS at 04:47

## 2024-04-28 RX ADMIN — ACETAMINOPHEN 650 MG: 325 TABLET, FILM COATED ORAL at 00:24

## 2024-04-28 RX ADMIN — PIPERACILLIN AND TAZOBACTAM 4.5 G: 4; .5 INJECTION, POWDER, FOR SOLUTION INTRAVENOUS at 18:34

## 2024-04-28 RX ADMIN — KETOROLAC TROMETHAMINE 15 MG: 15 INJECTION, SOLUTION INTRAMUSCULAR; INTRAVENOUS at 09:20

## 2024-04-28 ASSESSMENT — ACTIVITIES OF DAILY LIVING (ADL)
ADLS_ACUITY_SCORE: 29

## 2024-04-28 NOTE — PROGRESS NOTES
"In room for PICC placement for TPN.  Wife in room also.  Patient lethargic, but asks appropriate questions.  Patient states he is too tired for PICC tonight, but may be open to PICC placement tomorrow.  Patient also expresses frustration regarding failure of previous interventions. Patient would like to know what the plan is after PICC placement and TPN.  Discuss with primary RN, will attempt to have physician speak with patient and wife simultaneously (wife may only be available by phone 4/29/24, but would like to be part of discussion) prior to PICC placement, to clarify objectives and care plan during and after TPN.  Patient very pleasant, but states \"it has been a long road\" and he is tired of being ill.  "

## 2024-04-28 NOTE — PLAN OF CARE
Goal Outcome Evaluation:      Plan of Care Reviewed With: patient, spouse    Overall Patient Progress: improvingOverall Patient Progress: improving    Outcome Evaluation: Pain managment. No Ostomy OP yet.    Date & Time: 4/27/2024 7p-11p  Surgery/POD#: POD2 resection, abdominal washout, end colostomy  Behavior & Aggression: Green  Fall Risk: Yes  Orientation: A&Ox4  ABNL VS/O2: VSS, O2 96% on 1L humidified NC  ABNL Labs: WBC 12.4, Hgb 11.0  Pain Management: PRN morphine x1. Ice pack.  Bowel/Bladder: austin out, due to void, colostomy intact, no output/gas  Drains: NG to LIS, MARIA ISABEL w-minimal out  Wounds/Incisions: 5 lap sites, midline incision, MARIA ISABEL site.  Diet: NPO +ice, gastrograffin challenge  Activity Level: moves in bed, up w 1-2+belt  Tests/Procedures: ABD x-Ray between 8-10 tonight  Anticipated DC Date: pending  Significant Information: Pain control. Nausea with morphine, Zofran givenx1. Wife at bedside.

## 2024-04-28 NOTE — CONSULTS
"CLINICAL NUTRITION SERVICES  -  ASSESSMENT NOTE      Recommendations Ordered by Registered Dietitian (RD):     Plan to begin TPN once central line placed:  TOTAL IVF = 100 mL/hr  250 mL 20% lipid daily (20.8 mL/hr x 12 hrs, 8pm-8am)  Step #1: Begin TPN @ 50 mL/hr for the first 24 hrs - 1200 mL/day, 60 gm amino acid/day, 180 gm dextrose/day, GIR 1.4  Step #2: Increase TPN rate to 75 mL/hr for 24 hrs - 1800 mL/day, 90 gm amino acid/day, 270 gm dextrose/day, GIR 2.1  Step #3 (goal): Increase TPN to final goal rate of 100 mL/hr - 2400 mL/day, 110 gm amino acid/day, 380 gm dextrose/day, GIR 3  GOAL TPN = 2232 cals (25 cals/kg), 110 gm pro (1.25 gm/kg), 22% cals from fat       Malnutrition:   % Weight Loss:  Weight loss does not meet criteria for malnutrition  - down 6% in 2 months  % Intake:  </= 50% for >/= 5 days (severe malnutrition)  Subcutaneous Fat Loss:  None observed  Muscle Loss:  Temporal region - mild depletion  Fluid Retention:  None noted    Malnutrition Diagnosis: Patient does not meet two of the above criteria necessary for diagnosing malnutrition        REASON FOR ASSESSMENT  Castillo Alba is a 68 year old male seen by Registered Dietitian for Pharmacy/Nutrition to Start and Manage PN    Nutrition Admission Screen:  Have you recently lost weight without trying? \"NO\"  Have you been eating poorly because of a decreased appetite? \"NO\"        NUTRITION HISTORY  Chart reviewed  Visited with pt this afternoon - friend also in room  Pt notes that he has been having \"stomach\" issues since November  Intake has been decreased  He confirms that he has lost wt - friend concurs, \"he used to have a big gut that you could barely get your arms around!\"      CURRENT NUTRITION ORDERS  Diet Order:     NPO (day 5)    Order for PICC placement and initiation of TPN    Pt denies feeling hungry - \"feel more thirsty\"      NUTRITION FOCUSED PHYSICAL ASSESSMENT FOR DIAGNOSING MALNUTRITION)  Yes            Observed:    Muscle " "wasting (refer to documentation in Malnutrition section)    Obtained from Chart/Interdisciplinary Team:  4/24:   1.  Exploratory laparotomy with abdominal washout   2.  Resection of colorectal anastomosis   3.  Creation of end colostomy     ANTHROPOMETRICS  Height: 6' 0\"  Weight:(4/24) 110.2 kg / 243 lbs 0 oz  Body mass index is 32.96 kg/m .  Weight Status:  Overweight BMI 25-29.9  IBW: 80.9 kg  % IBW: 136%  Weight History:   Pt confirms that wt is down from February - down ~17# (6%)  Wt Readings from Last 10 Encounters:   04/24/24 110.2 kg (243 lb)   04/17/24 109.8 kg (242 lb)   04/16/24 111.1 kg (245 lb)   02/18/24 117.9 kg (260 lb)   10/17/19 104.3 kg (230 lb)         LABS  4/27: K 3.8           Mg 2.2    MEDICATIONS  IVF (D5 containing) @ 100 mL/hr  (408 cals)      ASSESSED NUTRITION NEEDS PER APPROVED PRACTICE GUIDELINES:    Dosing Weight 88 kg (adjusted wt for overwt)  Estimated Energy Needs: 4101-2724 kcals (25-30 Kcal/Kg)  Justification: maintenance  Estimated Protein Needs: 105-130 grams protein (1.2-1.5 g pro/Kg)  Justification: post-op  Estimated Fluid Needs: 6901-4924  mL (1 mL/Kcal)  Justification: maintenance    MALNUTRITION:  % Weight Loss:  Weight loss does not meet criteria for malnutrition  - down 6% in 2 months  % Intake:  </= 50% for >/= 5 days (severe malnutrition)  Subcutaneous Fat Loss:  None observed  Muscle Loss:  Temporal region - mild depletion  Fluid Retention:  None noted    Malnutrition Diagnosis: Patient does not meet two of the above criteria necessary for diagnosing malnutrition      NUTRITION DIAGNOSIS:  Inadequate protein-energy intake related to NPO status as evidenced by pt not meeting nutrition needs      NUTRITION INTERVENTIONS  Recommendations / Nutrition Prescription  NPO    Plan to begin TPN once central line placed:  TOTAL IVF = 100 mL/hr  250 mL 20% lipid daily (20.8 mL/hr x 12 hrs, 8pm-8am)  Step #1: Begin TPN @ 50 mL/hr for the first 24 hrs - 1200 mL/day, 60 gm amino " acid/day, 180 gm dextrose/day, GIR 1.4  Step #2: Increase TPN rate to 75 mL/hr for 24 hrs - 1800 mL/day, 90 gm amino acid/day, 270 gm dextrose/day, GIR 2.1  Step #3 (goal): Increase TPN to final goal rate of 100 mL/hr - 2400 mL/day, 110 gm amino acid/day, 380 gm dextrose/day, GIR 3  GOAL TPN = 2232 cals (25 cals/kg), 110 gm pro (1.25 gm/kg), 22% cals from fat  .      Implementation  Nutrition education: reviewed plans for TPN with pt (and friend), questions answered  PN Composition and PN Schedule: orders entered in EPIC  .      Nutrition Goals  TPN to meet needs while pt is NPO  .      MONITORING AND EVALUATION:  Progress towards goals will be monitored and evaluated per protocol and Practice Guidelines

## 2024-04-28 NOTE — PLAN OF CARE
Goal Outcome Evaluation:      Plan of Care Reviewed With: patient, spouse    Overall Patient Progress: improvingOverall Patient Progress: improving    Outcome Evaluation: Pain control, new back pain. No Ostomy OP. Small MARIA ISABEL drain.    Date & Time: 4/27/2024 7p-11p  Surgery/POD#: POD2 resection, abdominal washout, end colostomy  Behavior & Aggression: Green  Fall Risk: Yes  Orientation: A&Ox4  ABNL VS/O2: VSS, O2 96% on 1L humidified NC  ABNL Labs: WBC 12.4, Hgb 11.0  Pain Management: PRN morphine x4, Toradol x1. Scheduled tylenol. Ice pack.  Bowel/Bladder: Voiding in urinal adequately , colostomy intact, no output/gas  Drains: NG to LIS, MARIA ISABEL w-minimal out  Wounds/Incisions: 5 lap sites, midline incision, MARIA ISABEL site.  Diet: NPO +ice, gastrograffin challenge  Activity Level: moves in bed, up w 1-2+belt  Tests/Procedures: ABD x-Ray between 8-10 tonight  Anticipated DC Date: pending  Significant Information: Pain control, C/c back pain.  Nausea Zofran givenx1. Lost IV on right hand.

## 2024-04-28 NOTE — PROGRESS NOTES
Date & Time: 4083-4373  Surgery/POD#: POD 4 Resection of Colorectal Anastomosis, Abdominal Washout, End Colostomy   Behavior & Aggression: Green  Fall Risk: Yes  Orientation:A/Ox4  ABNL VS/O2: VSS on 1L NC w/humidifier   ABNL Labs: K and Mg redraw in AM, Phos 1.9   Pain Management:PRN Morphine and Toradol   Bowel/Bladder: Urinal-Marlyn  Drains: Maria Isabel drain, Colostomy, NGT  Wounds/incisions:  Midline, Lap sites, MAIRA ISABEL drain   Diet:NPO except ice chips  Activity Level: SBA Gb/WK  Anticipated  DC Date: Pending ROBF  Significant Information: Small gas in colostomy, nausea managed with PRN Compazine, abdominal and MARIA ISABEL dressing changed. NGT to LIS with green output.    PICC placement deferred, patient and his wife wants to speak with MD first. On-call colorectal MD aware.

## 2024-04-29 ENCOUNTER — APPOINTMENT (OUTPATIENT)
Dept: GENERAL RADIOLOGY | Facility: CLINIC | Age: 69
DRG: 329 | End: 2024-04-29
Attending: SURGERY
Payer: COMMERCIAL

## 2024-04-29 ENCOUNTER — APPOINTMENT (OUTPATIENT)
Dept: GENERAL RADIOLOGY | Facility: CLINIC | Age: 69
DRG: 329 | End: 2024-04-29
Attending: COLON & RECTAL SURGERY
Payer: COMMERCIAL

## 2024-04-29 ENCOUNTER — APPOINTMENT (OUTPATIENT)
Dept: OCCUPATIONAL THERAPY | Facility: CLINIC | Age: 69
DRG: 329 | End: 2024-04-29
Payer: COMMERCIAL

## 2024-04-29 LAB
ALBUMIN SERPL BCG-MCNC: 2.4 G/DL (ref 3.5–5.2)
ALP SERPL-CCNC: 61 U/L (ref 40–150)
ALT SERPL W P-5'-P-CCNC: 5 U/L (ref 0–70)
ANION GAP SERPL CALCULATED.3IONS-SCNC: 11 MMOL/L (ref 7–15)
AST SERPL W P-5'-P-CCNC: 15 U/L (ref 0–45)
BILIRUB DIRECT SERPL-MCNC: 1.54 MG/DL (ref 0–0.3)
BILIRUB SERPL-MCNC: 2.1 MG/DL
BUN SERPL-MCNC: 9.8 MG/DL (ref 8–23)
CALCIUM SERPL-MCNC: 8.1 MG/DL (ref 8.8–10.2)
CHLORIDE SERPL-SCNC: 100 MMOL/L (ref 98–107)
CREAT SERPL-MCNC: 0.72 MG/DL (ref 0.67–1.17)
DEPRECATED HCO3 PLAS-SCNC: 24 MMOL/L (ref 22–29)
EGFRCR SERPLBLD CKD-EPI 2021: >90 ML/MIN/1.73M2
GLUCOSE BLDC GLUCOMTR-MCNC: 101 MG/DL (ref 70–99)
GLUCOSE BLDC GLUCOMTR-MCNC: 113 MG/DL (ref 70–99)
GLUCOSE BLDC GLUCOMTR-MCNC: 122 MG/DL (ref 70–99)
GLUCOSE BLDC GLUCOMTR-MCNC: 128 MG/DL (ref 70–99)
GLUCOSE SERPL-MCNC: 132 MG/DL (ref 70–99)
INR PPP: 1.26 (ref 0.85–1.15)
MAGNESIUM SERPL-MCNC: 2.1 MG/DL (ref 1.7–2.3)
PHOSPHATE SERPL-MCNC: 3.6 MG/DL (ref 2.5–4.5)
POTASSIUM SERPL-SCNC: 3.8 MMOL/L (ref 3.4–5.3)
PREALB SERPL-MCNC: 3.8 MG/DL (ref 20–40)
PROT SERPL-MCNC: 5.8 G/DL (ref 6.4–8.3)
SODIUM SERPL-SCNC: 135 MMOL/L (ref 135–145)
TRIGL SERPL-MCNC: 204 MG/DL

## 2024-04-29 PROCEDURE — B4185 PARENTERAL SOL 10 GM LIPIDS: HCPCS | Mod: JZ

## 2024-04-29 PROCEDURE — 36415 COLL VENOUS BLD VENIPUNCTURE: CPT

## 2024-04-29 PROCEDURE — 80053 COMPREHEN METABOLIC PANEL: CPT | Performed by: COLON & RECTAL SURGERY

## 2024-04-29 PROCEDURE — 999N000065 XR CHEST PORT 1 VIEW

## 2024-04-29 PROCEDURE — 84100 ASSAY OF PHOSPHORUS: CPT | Performed by: COLON & RECTAL SURGERY

## 2024-04-29 PROCEDURE — 258N000003 HC RX IP 258 OP 636: Performed by: COLON & RECTAL SURGERY

## 2024-04-29 PROCEDURE — 97530 THERAPEUTIC ACTIVITIES: CPT | Mod: GO

## 2024-04-29 PROCEDURE — 120N000001 HC R&B MED SURG/OB

## 2024-04-29 PROCEDURE — 250N000011 HC RX IP 250 OP 636: Performed by: COLON & RECTAL SURGERY

## 2024-04-29 PROCEDURE — 74019 RADEX ABDOMEN 2 VIEWS: CPT

## 2024-04-29 PROCEDURE — 82248 BILIRUBIN DIRECT: CPT

## 2024-04-29 PROCEDURE — 250N000013 HC RX MED GY IP 250 OP 250 PS 637: Performed by: COLON & RECTAL SURGERY

## 2024-04-29 PROCEDURE — 84134 ASSAY OF PREALBUMIN: CPT

## 2024-04-29 PROCEDURE — 250N000009 HC RX 250: Mod: JZ

## 2024-04-29 PROCEDURE — 83735 ASSAY OF MAGNESIUM: CPT | Performed by: COLON & RECTAL SURGERY

## 2024-04-29 PROCEDURE — 250N000009 HC RX 250: Performed by: COLON & RECTAL SURGERY

## 2024-04-29 PROCEDURE — 84478 ASSAY OF TRIGLYCERIDES: CPT | Performed by: COLON & RECTAL SURGERY

## 2024-04-29 PROCEDURE — 36569 INSJ PICC 5 YR+ W/O IMAGING: CPT

## 2024-04-29 PROCEDURE — 85610 PROTHROMBIN TIME: CPT

## 2024-04-29 PROCEDURE — G0463 HOSPITAL OUTPT CLINIC VISIT: HCPCS

## 2024-04-29 PROCEDURE — 272N000451 HC KIT SHRLOCK 5FR POWER PICC DOUBLE LUMEN

## 2024-04-29 RX ADMIN — FAMOTIDINE 20 MG: 10 INJECTION, SOLUTION INTRAVENOUS at 08:26

## 2024-04-29 RX ADMIN — CYCLOBENZAPRINE 10 MG: 10 TABLET, FILM COATED ORAL at 08:31

## 2024-04-29 RX ADMIN — LIDOCAINE 2 PATCH: 4 PATCH TOPICAL at 08:28

## 2024-04-29 RX ADMIN — PIPERACILLIN AND TAZOBACTAM 4.5 G: 4; .5 INJECTION, POWDER, FOR SOLUTION INTRAVENOUS at 01:02

## 2024-04-29 RX ADMIN — POTASSIUM CHLORIDE, DEXTROSE MONOHYDRATE AND SODIUM CHLORIDE: 150; 5; 450 INJECTION, SOLUTION INTRAVENOUS at 20:17

## 2024-04-29 RX ADMIN — MORPHINE SULFATE 4 MG: 2 INJECTION, SOLUTION INTRAMUSCULAR; INTRAVENOUS at 12:46

## 2024-04-29 RX ADMIN — ASCORBIC ACID, VITAMIN A PALMITATE, CHOLECALCIFEROL, THIAMINE HYDROCHLORIDE, RIBOFLAVIN-5 PHOSPHATE SODIUM, PYRIDOXINE HYDROCHLORIDE, NIACINAMIDE, DEXPANTHENOL, ALPHA-TOCOPHEROL ACETATE, VITAMIN K1, FOLIC ACID, BIOTIN, CYANOCOBALAMIN: 200; 3300; 200; 6; 3.6; 6; 40; 15; 10; 150; 600; 60; 5 INJECTION, SOLUTION INTRAVENOUS at 20:36

## 2024-04-29 RX ADMIN — CYCLOBENZAPRINE 10 MG: 10 TABLET, FILM COATED ORAL at 21:43

## 2024-04-29 RX ADMIN — PIPERACILLIN AND TAZOBACTAM 4.5 G: 4; .5 INJECTION, POWDER, FOR SOLUTION INTRAVENOUS at 06:21

## 2024-04-29 RX ADMIN — LIDOCAINE HYDROCHLORIDE 1 ML: 10 INJECTION, SOLUTION EPIDURAL; INFILTRATION; INTRACAUDAL; PERINEURAL at 11:16

## 2024-04-29 RX ADMIN — OLIVE OIL AND SOYBEAN OIL 250 ML: 16; 4 INJECTION, EMULSION INTRAVENOUS at 20:36

## 2024-04-29 RX ADMIN — MORPHINE SULFATE 4 MG: 2 INJECTION, SOLUTION INTRAMUSCULAR; INTRAVENOUS at 20:21

## 2024-04-29 RX ADMIN — HEPARIN SODIUM 5000 UNITS: 5000 INJECTION, SOLUTION INTRAVENOUS; SUBCUTANEOUS at 04:39

## 2024-04-29 RX ADMIN — VANCOMYCIN HYDROCHLORIDE 125 MG: 125 CAPSULE ORAL at 20:23

## 2024-04-29 RX ADMIN — FAMOTIDINE 20 MG: 10 INJECTION, SOLUTION INTRAVENOUS at 20:23

## 2024-04-29 RX ADMIN — HEPARIN SODIUM 5000 UNITS: 5000 INJECTION, SOLUTION INTRAVENOUS; SUBCUTANEOUS at 20:23

## 2024-04-29 RX ADMIN — VANCOMYCIN HYDROCHLORIDE 125 MG: 125 CAPSULE ORAL at 08:31

## 2024-04-29 RX ADMIN — HEPARIN SODIUM 5000 UNITS: 5000 INJECTION, SOLUTION INTRAVENOUS; SUBCUTANEOUS at 12:26

## 2024-04-29 RX ADMIN — POTASSIUM CHLORIDE, DEXTROSE MONOHYDRATE AND SODIUM CHLORIDE: 150; 5; 450 INJECTION, SOLUTION INTRAVENOUS at 05:11

## 2024-04-29 RX ADMIN — PIPERACILLIN AND TAZOBACTAM 4.5 G: 4; .5 INJECTION, POWDER, FOR SOLUTION INTRAVENOUS at 12:26

## 2024-04-29 RX ADMIN — CYCLOBENZAPRINE 10 MG: 10 TABLET, FILM COATED ORAL at 16:49

## 2024-04-29 RX ADMIN — PIPERACILLIN AND TAZOBACTAM 4.5 G: 4; .5 INJECTION, POWDER, FOR SOLUTION INTRAVENOUS at 18:20

## 2024-04-29 ASSESSMENT — ACTIVITIES OF DAILY LIVING (ADL)
ADLS_ACUITY_SCORE: 25
ADLS_ACUITY_SCORE: 29
ADLS_ACUITY_SCORE: 29
ADLS_ACUITY_SCORE: 25
ADLS_ACUITY_SCORE: 29
ADLS_ACUITY_SCORE: 25
ADLS_ACUITY_SCORE: 25
ADLS_ACUITY_SCORE: 29
ADLS_ACUITY_SCORE: 25
ADLS_ACUITY_SCORE: 29
ADLS_ACUITY_SCORE: 25

## 2024-04-29 NOTE — PLAN OF CARE
Goal Outcome Evaluation:      Plan of Care Reviewed With: patient    Overall Patient Progress: improvingOverall Patient Progress: improving    Outcome Evaluation: Feel stronger and better after TPN infususion

## 2024-04-29 NOTE — PLAN OF CARE
Goal Outcome Evaluation:      Plan of Care Reviewed With: patient        Date & Time: 4/29/2024 7p-11p  Surgery/POD#: POD5 Resection of Colorectal Anastomosis, abdominal washout, end colostomy  Behavior & Aggression: Green  Fall Risk: Yes  Orientation: A&Ox4  ABNL VS/O2: VSS on RA  ABNL Labs: WBC 12, Hgb 10.1, P 1.9.  Pain Management: PRN morphine and schedule Tylenol.  Bowel/Bladder: Voiding in urinal adequately , colostomy intact, no output, small gas.   Drains: NG to LIS, MARIA ISABEL w-minimal out  Wounds/Incisions: 5 lap sites, midline incision, MARIA ISABEL site.  Diet: NPO/ice chips  Activity Level: SBA GB/W  Anticipated DC Date: pending  Significant Information: Pain control. K, Mg, P protocol. Plan to have PICC line and start TPN 4/29.

## 2024-04-29 NOTE — CONSULTS
SPIRITUAL HEALTH SERVICES Consult Note  [facility]  Gen Surg    Visited with Kuldeep per his request over the weekend for [employer]oration[employer] communion. He indicated that he is unable to consume anything at this time. Provided a communion blessing for him for patients who are unable to physically consume host.    Kuldeep was awake and sitting up but drowsy. He states that appreciates the visit and welcomes a follow-up visit when he is feeling more up to conversation.    Will schedule follow-up visit with Kuldeep in the next 48 hours.  will continue to follow and is available by request.    Marin Garcia  Associate   [facility] Phone Line [phone]  Spiritual Health Pager [phone]    [facility] available 24/7 for emergent requests/referrals, either by paging the on-call  or by entering an ASAP/STAT consult in [facility], which will also page the on-call .

## 2024-04-29 NOTE — PROCEDURES
[facility]    Double Lumen PICC Placement    Date/Time: 4/29/2024 11:37 AM    Performed by: Pamela Thomas RN  Authorized by: Julio César Joya MD  Indications: vascular access      UNIVERSAL PROTOCOL   Site Marked: Yes  Prior Images Obtained and Reviewed:  Yes  Required items: Required blood products, implants, devices and special equipment available    Patient identity confirmed:  Verbally with patient, hospital-assigned identification number, arm band and provided demographic data  NA - No sedation, light sedation, or local anesthesia  Confirmation Checklist:  Patient's identity using two indicators, procedure was appropriate and matched the consent or emergent situation, correct equipment/implants were available and relevant allergies  Time out: Immediately prior to the procedure a time out was called    Universal Protocol: the Joint Commission Universal Protocol was followed    Preparation: Patient was prepped and draped in usual sterile fashion    ESBL (mL):  2     ANESTHESIA    Local Anesthetic:  Lidocaine 1% with epinephrine  Anesthetic Total (mL):  1      SEDATION    Patient Sedated: No        Preparation: skin prepped with ChloraPrep  Skin prep agent: skin prep agent completely dried prior to procedure  Sterile barriers: maximum sterile barriers were used: cap, mask, sterile gown, sterile gloves, and large sterile sheet  Hand hygiene: hand hygiene performed prior to central venous catheter insertion  Type of line used: PICC  Catheter type: double lumen  Lumen type: power PICC  Catheter size: 5 Fr  Brand: Bard  Placement method: ultrasound and MST  Number of attempts: 1  Difficulty threading catheter: no  Successful placement: yes  Orientation: right    Location: basilic vein  Tip Location: Other (see comment) (chest x-ray pending)  Arm circumference: adults 10 cm  Extremity circumference: 37  Visible catheter length: 6  Total catheter length: 47  Dressing and securement:  chlorhexidine disc applied, occlusive dressing applied and sterile dressing applied  Post procedure assessment: blood return through all ports and placement verified by x-ray  PROCEDURE   Patient Tolerance:  Patient tolerated the procedure well with no immediate complications   Picc placed without difficulty/chest x-ray pending for placement  Disposal: sharps and needle count correct at the end of procedure, needles and guidewire disposed in sharps container

## 2024-04-29 NOTE — PROGRESS NOTES
Date & Time: 0700-1930  Surgery/POD#: POD 5 Exp Lap with takedown and creation of an end colostomy   Behavior & Aggression: Green  Fall Risk: Yes  Orientation:x4  ABNL VS/O2:On room air  ABNL Labs: See chart  Pain Management:IV Morphine   Bowel/Bladder: Continent   Drains: PICC, Colostomy, GILLIAN, NG TTube  Wounds/incisions: Abd midline incision, ostomy site, gillian site  Diet:NPO  Activity Level: Ax1  Tests/Procedures: NA  Anticipated  DC Date: Pending   Significant Information: PICC placed today, to start TPN tonight. Plan for ng tube clamping trail tmrw. No acute changes

## 2024-04-29 NOTE — PROGRESS NOTES
COLON & RECTAL SURGERY  PROGRESS NOTE    April 29, 2024  Post-op Day # 5    SUBJECTIVE:  Pain controlled. Having stoma function.     OBJECTIVE:  Temp:  [98  F (36.7  C)-98.6  F (37  C)] 98.6  F (37  C)  Pulse:  [71-80] 77  Resp:  [16-19] 17  BP: (121-137)/(64-75) 137/75  SpO2:  [89 %-96 %] 91 %    Intake/Output Summary (Last 24 hours) at 4/29/2024 0858  Last data filed at 4/29/2024 0658  Gross per 24 hour   Intake 2334 ml   Output 1910 ml   Net 424 ml       GENERAL:  Awake, alert, no acute distress  EXTREMITIES: Warm and well perfused, no edema   ABDOMEN:  Soft, appropriately tender, non-distended. No guarding, rigidity, or peritoneal signs. Colostomy with stool and gas. Ruperto with hematoma.  INCISION:  C/d/i with staples     LABS:  Lab Results   Component Value Date    WBC 12.0 04/27/2024    WBC 6.6 10/17/2019     Lab Results   Component Value Date    HGB 10.1 04/27/2024    HGB 17.2 10/17/2019     Lab Results   Component Value Date    HCT 30.5 04/27/2024    HCT 49.0 10/17/2019     Lab Results   Component Value Date     04/27/2024     10/17/2019     Last Basic Metabolic Panel:  Lab Results   Component Value Date     04/29/2024     10/17/2019      Lab Results   Component Value Date    POTASSIUM 3.8 04/29/2024    POTASSIUM 3.8 10/17/2019     Lab Results   Component Value Date    CHLORIDE 100 04/29/2024    CHLORIDE 106 10/17/2019     Lab Results   Component Value Date    LENORE 8.1 04/29/2024    LENORE 9.3 10/17/2019     Lab Results   Component Value Date    CO2 24 04/29/2024    CO2 26 10/17/2019     Lab Results   Component Value Date    BUN 9.8 04/29/2024    BUN 17 10/17/2019     Lab Results   Component Value Date    CR 0.72 04/29/2024    CR 0.84 10/17/2019     Lab Results   Component Value Date     04/29/2024     04/29/2024     10/17/2019       ASSESSMENT/PLAN: Castillo Alba is a 68 year old man POD 5 s/p exploratory laparotomy with takedown of colorectal anastomosis and  creation of an end colostomy for management of an anastomotic leak due to a pelvic hematoma.     NPO.  Continue NG tube for management of post-op ileus.  Will do PICC and TPN given prolonged n.p.o. status.  Tentative plan for NG clamping trial tomorrow given output from colostomy.  Multimodal pain control   PT/OT. Encourage ambulation.   Continue abdominal MARIA ISABEL drain  Broad-spectrum antibiotics for 5 days given abdominal contamination to complete today.  Will also do prophylactic vancomycin given history of C. difficile infection.  SQH and SCDs for DVT prophylaxis  WOCN for stoma teachings and cares while inpatient.  Will need home health care arranged.    Clinically Significant Risk Factors              # Hypoalbuminemia: Lowest albumin = 2.4 g/dL at 4/29/2024  3:37 AM, will monitor as appropriate  # Coagulation Defect: INR = 1.26 (Ref range: 0.85 - 1.15) and/or PTT = N/A, will monitor for bleeding           # Obesity: Estimated body mass index is 32.96 kg/m  as calculated from the following:    Height as of this encounter: 1.829 m (6').    Weight as of this encounter: 110.2 kg (243 lb).         Post-operative ileus related to surgery    For questions/paging, please contact the CRS office at [phone].    Alejandra Cruz MD  Colorectal Surgery    [facility]  [address] [address]  T: [phone]  F: [phone]

## 2024-04-30 LAB
ANION GAP SERPL CALCULATED.3IONS-SCNC: 9 MMOL/L (ref 7–15)
BUN SERPL-MCNC: 6.7 MG/DL (ref 8–23)
CALCIUM SERPL-MCNC: 8.1 MG/DL (ref 8.8–10.2)
CHLORIDE SERPL-SCNC: 101 MMOL/L (ref 98–107)
CREAT SERPL-MCNC: 0.63 MG/DL (ref 0.67–1.17)
DEPRECATED HCO3 PLAS-SCNC: 26 MMOL/L (ref 22–29)
EGFRCR SERPLBLD CKD-EPI 2021: >90 ML/MIN/1.73M2
ERYTHROCYTE [DISTWIDTH] IN BLOOD BY AUTOMATED COUNT: 17.4 % (ref 10–15)
GLUCOSE BLDC GLUCOMTR-MCNC: 130 MG/DL (ref 70–99)
GLUCOSE BLDC GLUCOMTR-MCNC: 140 MG/DL (ref 70–99)
GLUCOSE BLDC GLUCOMTR-MCNC: 145 MG/DL (ref 70–99)
GLUCOSE BLDC GLUCOMTR-MCNC: 155 MG/DL (ref 70–99)
GLUCOSE BLDC GLUCOMTR-MCNC: 156 MG/DL (ref 70–99)
GLUCOSE BLDC GLUCOMTR-MCNC: 169 MG/DL (ref 70–99)
GLUCOSE SERPL-MCNC: 176 MG/DL (ref 70–99)
HCT VFR BLD AUTO: 27.7 % (ref 40–53)
HGB BLD-MCNC: 9.3 G/DL (ref 13.3–17.7)
MAGNESIUM SERPL-MCNC: 2 MG/DL (ref 1.7–2.3)
MCH RBC QN AUTO: 29.5 PG (ref 26.5–33)
MCHC RBC AUTO-ENTMCNC: 33.6 G/DL (ref 31.5–36.5)
MCV RBC AUTO: 88 FL (ref 78–100)
PHOSPHATE SERPL-MCNC: 2.5 MG/DL (ref 2.5–4.5)
PLATELET # BLD AUTO: 679 10E3/UL (ref 150–450)
POTASSIUM SERPL-SCNC: 3.6 MMOL/L (ref 3.4–5.3)
RBC # BLD AUTO: 3.15 10E6/UL (ref 4.4–5.9)
SODIUM SERPL-SCNC: 136 MMOL/L (ref 135–145)
WBC # BLD AUTO: 11 10E3/UL (ref 4–11)

## 2024-04-30 PROCEDURE — 3E0436Z INTRODUCTION OF NUTRITIONAL SUBSTANCE INTO CENTRAL VEIN, PERCUTANEOUS APPROACH: ICD-10-PCS | Performed by: COLON & RECTAL SURGERY

## 2024-04-30 PROCEDURE — 250N000009 HC RX 250: Performed by: COLON & RECTAL SURGERY

## 2024-04-30 PROCEDURE — 85027 COMPLETE CBC AUTOMATED: CPT | Performed by: COLON & RECTAL SURGERY

## 2024-04-30 PROCEDURE — 250N000013 HC RX MED GY IP 250 OP 250 PS 637: Performed by: SURGERY

## 2024-04-30 PROCEDURE — 83735 ASSAY OF MAGNESIUM: CPT

## 2024-04-30 PROCEDURE — 258N000003 HC RX IP 258 OP 636: Performed by: COLON & RECTAL SURGERY

## 2024-04-30 PROCEDURE — 120N000001 HC R&B MED SURG/OB

## 2024-04-30 PROCEDURE — 250N000012 HC RX MED GY IP 250 OP 636 PS 637

## 2024-04-30 PROCEDURE — 84100 ASSAY OF PHOSPHORUS: CPT

## 2024-04-30 PROCEDURE — B4185 PARENTERAL SOL 10 GM LIPIDS: HCPCS | Mod: JZ

## 2024-04-30 PROCEDURE — 80048 BASIC METABOLIC PNL TOTAL CA: CPT

## 2024-04-30 PROCEDURE — 250N000011 HC RX IP 250 OP 636: Performed by: COLON & RECTAL SURGERY

## 2024-04-30 PROCEDURE — 250N000009 HC RX 250: Mod: JZ

## 2024-04-30 PROCEDURE — 250N000013 HC RX MED GY IP 250 OP 250 PS 637: Performed by: COLON & RECTAL SURGERY

## 2024-04-30 RX ORDER — SODIUM CHLORIDE, SODIUM LACTATE, POTASSIUM CHLORIDE, CALCIUM CHLORIDE 600; 310; 30; 20 MG/100ML; MG/100ML; MG/100ML; MG/100ML
INJECTION, SOLUTION INTRAVENOUS CONTINUOUS
Status: DISCONTINUED | OUTPATIENT
Start: 2024-04-30 | End: 2024-04-30

## 2024-04-30 RX ORDER — SODIUM CHLORIDE, SODIUM LACTATE, POTASSIUM CHLORIDE, CALCIUM CHLORIDE 600; 310; 30; 20 MG/100ML; MG/100ML; MG/100ML; MG/100ML
INJECTION, SOLUTION INTRAVENOUS CONTINUOUS
Status: ACTIVE | OUTPATIENT
Start: 2024-04-30 | End: 2024-04-30

## 2024-04-30 RX ORDER — SODIUM CHLORIDE, SODIUM LACTATE, POTASSIUM CHLORIDE, CALCIUM CHLORIDE 600; 310; 30; 20 MG/100ML; MG/100ML; MG/100ML; MG/100ML
INJECTION, SOLUTION INTRAVENOUS CONTINUOUS
Status: DISCONTINUED | OUTPATIENT
Start: 2024-04-30 | End: 2024-05-01

## 2024-04-30 RX ADMIN — INSULIN ASPART 1 UNITS: 100 INJECTION, SOLUTION INTRAVENOUS; SUBCUTANEOUS at 21:40

## 2024-04-30 RX ADMIN — HEPARIN SODIUM 5000 UNITS: 5000 INJECTION, SOLUTION INTRAVENOUS; SUBCUTANEOUS at 11:28

## 2024-04-30 RX ADMIN — CYCLOBENZAPRINE 10 MG: 10 TABLET, FILM COATED ORAL at 21:40

## 2024-04-30 RX ADMIN — ACETAMINOPHEN 650 MG: 325 TABLET, FILM COATED ORAL at 16:26

## 2024-04-30 RX ADMIN — INSULIN ASPART 1 UNITS: 100 INJECTION, SOLUTION INTRAVENOUS; SUBCUTANEOUS at 10:36

## 2024-04-30 RX ADMIN — Medication 1 ML: at 16:19

## 2024-04-30 RX ADMIN — OLIVE OIL AND SOYBEAN OIL 250 ML: 16; 4 INJECTION, EMULSION INTRAVENOUS at 20:38

## 2024-04-30 RX ADMIN — Medication 1 ML: at 18:22

## 2024-04-30 RX ADMIN — CYCLOBENZAPRINE 10 MG: 10 TABLET, FILM COATED ORAL at 15:43

## 2024-04-30 RX ADMIN — INSULIN ASPART 1 UNITS: 100 INJECTION, SOLUTION INTRAVENOUS; SUBCUTANEOUS at 14:17

## 2024-04-30 RX ADMIN — MORPHINE SULFATE 2 MG: 2 INJECTION, SOLUTION INTRAMUSCULAR; INTRAVENOUS at 18:21

## 2024-04-30 RX ADMIN — LIDOCAINE 2 PATCH: 4 PATCH TOPICAL at 08:33

## 2024-04-30 RX ADMIN — INSULIN ASPART 1 UNITS: 100 INJECTION, SOLUTION INTRAVENOUS; SUBCUTANEOUS at 06:31

## 2024-04-30 RX ADMIN — CYCLOBENZAPRINE 10 MG: 10 TABLET, FILM COATED ORAL at 08:33

## 2024-04-30 RX ADMIN — HEPARIN SODIUM 5000 UNITS: 5000 INJECTION, SOLUTION INTRAVENOUS; SUBCUTANEOUS at 04:16

## 2024-04-30 RX ADMIN — MORPHINE SULFATE 4 MG: 2 INJECTION, SOLUTION INTRAMUSCULAR; INTRAVENOUS at 10:34

## 2024-04-30 RX ADMIN — INSULIN ASPART 1 UNITS: 100 INJECTION, SOLUTION INTRAVENOUS; SUBCUTANEOUS at 02:38

## 2024-04-30 RX ADMIN — HEPARIN SODIUM 5000 UNITS: 5000 INJECTION, SOLUTION INTRAVENOUS; SUBCUTANEOUS at 20:36

## 2024-04-30 RX ADMIN — POTASSIUM CHLORIDE, DEXTROSE MONOHYDRATE AND SODIUM CHLORIDE: 150; 5; 450 INJECTION, SOLUTION INTRAVENOUS at 05:20

## 2024-04-30 RX ADMIN — SODIUM CHLORIDE, POTASSIUM CHLORIDE, SODIUM LACTATE AND CALCIUM CHLORIDE: 600; 310; 30; 20 INJECTION, SOLUTION INTRAVENOUS at 10:33

## 2024-04-30 RX ADMIN — VANCOMYCIN HYDROCHLORIDE 125 MG: 125 CAPSULE ORAL at 20:37

## 2024-04-30 RX ADMIN — VANCOMYCIN HYDROCHLORIDE 125 MG: 125 CAPSULE ORAL at 08:33

## 2024-04-30 RX ADMIN — SODIUM CHLORIDE, POTASSIUM CHLORIDE, SODIUM LACTATE AND CALCIUM CHLORIDE: 600; 310; 30; 20 INJECTION, SOLUTION INTRAVENOUS at 20:38

## 2024-04-30 RX ADMIN — ACETAMINOPHEN 650 MG: 325 TABLET, FILM COATED ORAL at 21:40

## 2024-04-30 RX ADMIN — ASCORBIC ACID, VITAMIN A PALMITATE, CHOLECALCIFEROL, THIAMINE HYDROCHLORIDE, RIBOFLAVIN-5 PHOSPHATE SODIUM, PYRIDOXINE HYDROCHLORIDE, NIACINAMIDE, DEXPANTHENOL, ALPHA-TOCOPHEROL ACETATE, VITAMIN K1, FOLIC ACID, BIOTIN, CYANOCOBALAMIN: 200; 3300; 200; 6; 3.6; 6; 40; 15; 10; 150; 600; 60; 5 INJECTION, SOLUTION INTRAVENOUS at 20:37

## 2024-04-30 RX ADMIN — FAMOTIDINE 20 MG: 10 INJECTION, SOLUTION INTRAVENOUS at 20:36

## 2024-04-30 RX ADMIN — FAMOTIDINE 20 MG: 10 INJECTION, SOLUTION INTRAVENOUS at 08:32

## 2024-04-30 ASSESSMENT — ACTIVITIES OF DAILY LIVING (ADL)
ADLS_ACUITY_SCORE: 27
ADLS_ACUITY_SCORE: 26
ADLS_ACUITY_SCORE: 27
ADLS_ACUITY_SCORE: 26
ADLS_ACUITY_SCORE: 27
ADLS_ACUITY_SCORE: 27
ADLS_ACUITY_SCORE: 29
ADLS_ACUITY_SCORE: 27
ADLS_ACUITY_SCORE: 26
ADLS_ACUITY_SCORE: 29
ADLS_ACUITY_SCORE: 27
ADLS_ACUITY_SCORE: 26
ADLS_ACUITY_SCORE: 27
ADLS_ACUITY_SCORE: 29

## 2024-04-30 NOTE — PROGRESS NOTES
"[facility] Nurse Inpatient Ostomy Assessment     Assessment of new end Colostomy     Surgery date:  4-24-24  Surgeon:  Anthony  Procedure:  EXPLORATORY LAPAROTOMY, RESECTION OF COLORECTAL ANASTAMOIS, ABDOMINAL WASHOUT, END COLOSTOMY,   Related diagnosis: for anastomotic leak     WO Assessment & Physical Exam:      Current status: Resting in bed. More alert and responsive today. Fecal output noted in appliance. TPN being initiated.   Date of last photo: 4-26-24                   Stoma location: LUQ  Stoma size:  1 1/2\" sligtly oval ,   Stoma appearance:  red, slightly protrudes  Mucocutaneous junction:  intact with sutures. Edematous, central lumen  Peristomal  skin: intact, no erythema.    Abdominal assessment:  distended and firm. Stoma sits in small divot   Surgical site(s): dressing to distal incision. Remainder incision well approximated with staples  NG still in place? Yes   Output: 250cc brown semi-liquid output in appliance  Pain: 4   Is patient still on a PCA? Yes    Psychosocial: wife supportive.     WOC Assessment          End colostomy: viable. GI function returning        Rose-stomal skin: no complications noted        MCJ: intact with sutures. NO local s/s infection        Pouching system:  NO undermining. continue convexity.         Psychosocial: wife supportive and involved.         Education:Teaching initiated with wife and patient.     Objective Data:     Patient history according to medical record:         Castillo Alba is a 68 year old male, seen at the request of Dr. Rubio, who presents with worsening abdominal pain, fatigue and rectal bleeding.  He underwent a robotic assisted left colectomy with colorectal anastomosis 1 week ago for management of complicated diverticulitis.  He did have a significantly inflamed colon at the time of surgery, as well as a small bowel loop that was adherent to the inflamed colon.  He was doing well until Monday when he developed " worsening abdominal pain.  He also noticed blood in his stool.  Given that he did not have significant improvement in his symptoms, he presented to the ER for evaluation.  On presentation, he was hemodynamically stable, with a mild leukocytosis, normal hemoglobin and otherwise relatively unremarkable labs.  He underwent a CT scan of the abdomen and pelvis which shows a large amount of free air in the upper abdomen and concern for a possible intra-abdominal fluid collection near his colorectal anastomosis.     Current Diet/Nutrition:  TPN   initiated   Orders Placed This Encounter      NPO for Medical/Clinical Reasons Except for: Meds, Ice Chips     Output:  I/O last 3 completed shifts:  In: 2334 [I.V.:2334]  Out: 1810 [Urine:350; Emesis/NG output:1300; Drains:10; Stool:150]    Labs:   Recent Labs   Lab 04/29/24  0337 04/27/24  0707   ALBUMIN 2.4*  --    PREALB 3.8*  --    HGB  --  10.1*   INR 1.26*  --    WBC  --  12.0*       Jerald Risk Assessment:   Sensory Perception: 4-->no impairment  Moisture: 4-->rarely moist  Activity: 3-->walks occasionally  Mobility: 3-->slightly limited  Nutrition: 3-->adequate  Friction and Shear: 3-->no apparent problem  Jerald Score: 20      WOC Interventions:     Patient's chart evaluated  Focus of today's visit: Demo appliance change for wife. Discussed stoma construction, general ostomy management..Discussed closed vs drainable as management  option in future  Pouch changed: Lauro NI convex barrier with ring and drainable pouch  Participant(s) in teaching session today: Pt and wife  Supplies: placed @ bedside   Preparation for discharge: TBD  Registered for supply samples? TBD  Discussed plan of care with: Pt and wife   WOC follow-up: Will meet with wife/patient on Wednesday     Kaya Beaulieu RN, CWOCN  -Securely message with [employer] (more info) - can reach individually by name or search 'WOC Nurse' ([place]) to reach all current WOCs on duty.  -WOC Office Phone:  [phone]

## 2024-04-30 NOTE — PLAN OF CARE
Goal Outcome Evaluation:      Plan of Care Reviewed With: patient    Overall Patient Progress: no changeOverall Patient Progress: no change    Outcome Evaluation: Not able to removed NG Tube due to high output after claping trial    Date & Time: 2456-6631  Surgery/POD#: POD 6 Exp Lap with creation of end colostomy   Behavior & Aggression: Green  Fall Risk: ES  Orientation:x4  ABNL VS/O2:On room air  ABNL Labs: See chart  Pain Management:IV Morphine   Bowel/Bladder: Continent   Drains: PICC, MARIA ISABEL, NG Tube, Colostomy   Wounds/incisions: Midline abd incision, MARIA ISABEL site  Diet:NPO  Activity Level: Ax1  Tests/Procedures: NA  Anticipated  DC Date: Pending   Significant Information: NG clamping trial failed due to high output, placed back to LIS. No acute distress

## 2024-04-30 NOTE — PROGRESS NOTES
Colon and Rectal Surgery  Daily Progress Note    Subjective  Patient reports doing ok. Has been ambulating a lot. Is thirsty but OK with the NGT for now if needed to stay in place. Hearing some gurgling when he walks but stoma not productive overnight. PICC placed and TPN started.      Objective  Intake/Output last 24 hrs:  Temp:  [98.4  F (36.9  C)-98.6  F (37  C)] 98.5  F (36.9  C)  Pulse:  [71-77] 71  Resp:  [17-18] 18  BP: (137-148)/(61-75) 148/66  SpO2:  [84 %-92 %] 92 %         Physical Exam:  General: awake, alert, in no acute distress  Respiratory: non-labored breathing  Abdomen: soft, appropriately tender, midlly-distended              Incisions: Intact with staples in place. Inferior aspect with some fibrinous discharge. Dressing changed.               Drains: Drain in place with serous discharge    Ostomy:  Pink, with no stool and minimal gas in the bag    Pertinent Labs  Lab Results: personally reviewed.  Lab Results   Component Value Date     04/30/2024     04/29/2024     04/27/2024     10/17/2019     09/14/2010     09/13/2010    CO2 26 04/30/2024    CO2 24 04/29/2024    CO2 27 04/27/2024    CO2 26 10/17/2019    CO2 27 09/14/2010    CO2 25 09/13/2010    BUN 6.7 04/30/2024    BUN 9.8 04/29/2024    BUN 16.4 04/27/2024    BUN 17 10/17/2019    BUN 16 09/14/2010    BUN 14 09/13/2010     Lab Results   Component Value Date    WBC 11.0 04/30/2024    WBC 12.0 04/27/2024    WBC 12.4 04/25/2024    WBC 6.6 10/17/2019    WBC 8.9 09/14/2010    WBC 9.8 09/13/2010    HGB 9.3 04/30/2024    HGB 10.1 04/27/2024    HGB 11.0 04/25/2024    HGB 17.2 10/17/2019    HGB 16.6 09/14/2010    HGB 16.3 09/13/2010    HCT 27.7 04/30/2024    HCT 30.5 04/27/2024    HCT 32.2 04/25/2024    HCT 49.0 10/17/2019    HCT 48.9 09/14/2010    HCT 48.2 09/13/2010    MCV 88 04/30/2024    MCV 88 04/27/2024    MCV 87 04/25/2024    MCV 94 10/17/2019    MCV 96 09/14/2010    MCV 97 09/13/2010     04/30/2024      04/27/2024     04/25/2024     10/17/2019     09/14/2010     09/13/2010       Assessment/Plan: Castillo Alba is a 68 year old man POD 6 s/p exploratory laparotomy with takedown of colorectal anastomosis and creation of an end colostomy for management of an anastomotic leak due to a pelvic hematoma.     - NGT with minimal output but also no bowel function. Will consider clamp trial today.   - Continue TPN.   - NPO for now  - Multimodal pain regimen   - Ambulate. PT/OT  - IS  - Maintain MARIA ISABEL   - Continue vancomycin for c. Diff prohylaxis   - SQH for DVT ppx   - WOCN teaching. Reagan need HH arranged     Will be Discussed with Dr. Anthony Smith MD on 4/30/2024 at 7:20 AM   Colon and rectal surgery fellow

## 2024-04-30 NOTE — PLAN OF CARE
Date & Time: 04/30/24 4697-5387    Surgery/POD#: POD 6 s/p exploratory laparotomy with takedown of colorectal anastomosis and creation of an end colostomy for management of an anastomotic leak due to a pelvic hematoma.     Behavior & Aggression: Green.  Fall Risk: Yes.  Orientation: A&Ox4.  ABNL VS/O2: VSS on 2-3 L of o2 overnight.  ABNL Labs: Q4   Pain Management: PRN tylenol & Morphine  Bowel/Bladder: Voiding frequently via urinal, Colostomy intact w/ no output, small gas.   IV/Drains: R PICC infusing LR@50 and TPN @50. NG tube  Wounds/Incisions/Skin: Wound care to abd BID  Diet: NPO w/ ice chips.  Activity Level: Ax1 w/ GB/WK.  Anticipated DC Date: TBD.  Significant Events: Clamp trial failed today. Day RN said pt was taking in a lot of ice chips from wife and that could have contributed to high NG output. Education was done with pt and son.

## 2024-04-30 NOTE — PLAN OF CARE
Date & Time: 1900-0730.  Surgery/POD#: POD 6 from a Resection of colorectal anastomosis w/ abdominal washout &  end colostomy placement.  Behavior & Aggression: Green - can be impulsive at times.  Fall Risk: Yes.  Orientation: A&Ox4.  ABNL VS/O2: VSS on 2-3 L of o2 overnight.  ABNL Labs: see chart. On K+, Mag, & Phos protocols, recheck in am labs. BGs were 128,156,155, 176.  Pain Management: Scheduled Robaxin. PRN Morphine per request.  Bowel/Bladder: Voiding frequently via urinal, Colostomy intact w/ no output, small gas.   IV/Drains: R PICC infusing D5W + 0.45% @ 100ml/hr & TPN/lipids @ 50ml/hr. NG to LIS. MARIA ISABEL in place.  Wounds/Incisions/Skin: Abdominal incisions are C/D/I - new dressing applied this morning.   Diet: NPO w/ ice chips.  Activity Level: Ax1 w/ GB/WK.  Anticipated DC Date: TBD.

## 2024-05-01 ENCOUNTER — APPOINTMENT (OUTPATIENT)
Dept: PHYSICAL THERAPY | Facility: CLINIC | Age: 69
DRG: 329 | End: 2024-05-01
Payer: COMMERCIAL

## 2024-05-01 ENCOUNTER — APPOINTMENT (OUTPATIENT)
Dept: CT IMAGING | Facility: CLINIC | Age: 69
DRG: 329 | End: 2024-05-01
Attending: SURGERY
Payer: COMMERCIAL

## 2024-05-01 ENCOUNTER — APPOINTMENT (OUTPATIENT)
Dept: ULTRASOUND IMAGING | Facility: CLINIC | Age: 69
DRG: 329 | End: 2024-05-01
Attending: RADIOLOGY
Payer: COMMERCIAL

## 2024-05-01 LAB
ALBUMIN SERPL BCG-MCNC: 2.7 G/DL (ref 3.5–5.2)
ALP SERPL-CCNC: 68 U/L (ref 40–150)
ALT SERPL W P-5'-P-CCNC: 8 U/L (ref 0–70)
ANION GAP SERPL CALCULATED.3IONS-SCNC: 10 MMOL/L (ref 7–15)
AST SERPL W P-5'-P-CCNC: 17 U/L (ref 0–45)
BILIRUB SERPL-MCNC: 1.3 MG/DL
BUN SERPL-MCNC: 9 MG/DL (ref 8–23)
CALCIUM SERPL-MCNC: 8.3 MG/DL (ref 8.8–10.2)
CHLORIDE SERPL-SCNC: 97 MMOL/L (ref 98–107)
CREAT SERPL-MCNC: 0.56 MG/DL (ref 0.67–1.17)
DEPRECATED HCO3 PLAS-SCNC: 26 MMOL/L (ref 22–29)
EGFRCR SERPLBLD CKD-EPI 2021: >90 ML/MIN/1.73M2
GLUCOSE BLDC GLUCOMTR-MCNC: 146 MG/DL (ref 70–99)
GLUCOSE BLDC GLUCOMTR-MCNC: 147 MG/DL (ref 70–99)
GLUCOSE BLDC GLUCOMTR-MCNC: 149 MG/DL (ref 70–99)
GLUCOSE BLDC GLUCOMTR-MCNC: 151 MG/DL (ref 70–99)
GLUCOSE BLDC GLUCOMTR-MCNC: 154 MG/DL (ref 70–99)
GLUCOSE BLDC GLUCOMTR-MCNC: 169 MG/DL (ref 70–99)
GLUCOSE SERPL-MCNC: 208 MG/DL (ref 70–99)
GRAM STAIN RESULT: NORMAL
MAGNESIUM SERPL-MCNC: 2 MG/DL (ref 1.7–2.3)
PHOSPHATE SERPL-MCNC: 3.4 MG/DL (ref 2.5–4.5)
POTASSIUM SERPL-SCNC: 3.7 MMOL/L (ref 3.4–5.3)
PROT SERPL-MCNC: 6.1 G/DL (ref 6.4–8.3)
SODIUM SERPL-SCNC: 133 MMOL/L (ref 135–145)

## 2024-05-01 PROCEDURE — 82040 ASSAY OF SERUM ALBUMIN: CPT | Performed by: COLON & RECTAL SURGERY

## 2024-05-01 PROCEDURE — 250N000009 HC RX 250: Performed by: RADIOLOGY

## 2024-05-01 PROCEDURE — G0463 HOSPITAL OUTPT CLINIC VISIT: HCPCS | Mod: 25

## 2024-05-01 PROCEDURE — 0W9G3ZZ DRAINAGE OF PERITONEAL CAVITY, PERCUTANEOUS APPROACH: ICD-10-PCS | Performed by: RADIOLOGY

## 2024-05-01 PROCEDURE — 120N000001 HC R&B MED SURG/OB

## 2024-05-01 PROCEDURE — 87205 SMEAR GRAM STAIN: CPT

## 2024-05-01 PROCEDURE — 83735 ASSAY OF MAGNESIUM: CPT

## 2024-05-01 PROCEDURE — B4185 PARENTERAL SOL 10 GM LIPIDS: HCPCS | Mod: JZ

## 2024-05-01 PROCEDURE — 97530 THERAPEUTIC ACTIVITIES: CPT | Mod: GP | Performed by: PHYSICAL THERAPIST

## 2024-05-01 PROCEDURE — 84100 ASSAY OF PHOSPHORUS: CPT

## 2024-05-01 PROCEDURE — 250N000009 HC RX 250: Performed by: COLON & RECTAL SURGERY

## 2024-05-01 PROCEDURE — 250N000009 HC RX 250: Mod: JZ

## 2024-05-01 PROCEDURE — 97116 GAIT TRAINING THERAPY: CPT | Mod: GP | Performed by: PHYSICAL THERAPIST

## 2024-05-01 PROCEDURE — 250N000013 HC RX MED GY IP 250 OP 250 PS 637: Performed by: COLON & RECTAL SURGERY

## 2024-05-01 PROCEDURE — 258N000003 HC RX IP 258 OP 636: Performed by: COLON & RECTAL SURGERY

## 2024-05-01 PROCEDURE — 87070 CULTURE OTHR SPECIMN AEROBIC: CPT

## 2024-05-01 PROCEDURE — 272N000431 US ASPIRATION OF SEROMA/HEMATOMA/ABSCESS/CYST

## 2024-05-01 PROCEDURE — 250N000011 HC RX IP 250 OP 636: Performed by: COLON & RECTAL SURGERY

## 2024-05-01 PROCEDURE — 74177 CT ABD & PELVIS W/CONTRAST: CPT

## 2024-05-01 RX ORDER — IOPAMIDOL 755 MG/ML
126 INJECTION, SOLUTION INTRAVASCULAR ONCE
Status: COMPLETED | OUTPATIENT
Start: 2024-05-01 | End: 2024-05-01

## 2024-05-01 RX ORDER — LIDOCAINE HYDROCHLORIDE 10 MG/ML
10 INJECTION, SOLUTION EPIDURAL; INFILTRATION; INTRACAUDAL; PERINEURAL ONCE
Status: COMPLETED | OUTPATIENT
Start: 2024-05-01 | End: 2024-05-01

## 2024-05-01 RX ORDER — SODIUM CHLORIDE, SODIUM LACTATE, POTASSIUM CHLORIDE, CALCIUM CHLORIDE 600; 310; 30; 20 MG/100ML; MG/100ML; MG/100ML; MG/100ML
INJECTION, SOLUTION INTRAVENOUS CONTINUOUS
Status: ACTIVE | OUTPATIENT
Start: 2024-05-01 | End: 2024-05-01

## 2024-05-01 RX ORDER — LIDOCAINE HYDROCHLORIDE 10 MG/ML
5 INJECTION, SOLUTION EPIDURAL; INFILTRATION; INTRACAUDAL; PERINEURAL ONCE
Status: COMPLETED | OUTPATIENT
Start: 2024-05-01 | End: 2024-05-01

## 2024-05-01 RX ADMIN — CYCLOBENZAPRINE 10 MG: 10 TABLET, FILM COATED ORAL at 22:06

## 2024-05-01 RX ADMIN — Medication 1 ML: at 09:28

## 2024-05-01 RX ADMIN — OLIVE OIL AND SOYBEAN OIL 250 ML: 16; 4 INJECTION, EMULSION INTRAVENOUS at 20:55

## 2024-05-01 RX ADMIN — MAGNESIUM SULFATE HEPTAHYDRATE: 500 INJECTION, SOLUTION INTRAMUSCULAR; INTRAVENOUS at 20:55

## 2024-05-01 RX ADMIN — SODIUM CHLORIDE 76 ML: 9 INJECTION, SOLUTION INTRAVENOUS at 11:43

## 2024-05-01 RX ADMIN — IOPAMIDOL 126 ML: 755 INJECTION, SOLUTION INTRAVENOUS at 11:42

## 2024-05-01 RX ADMIN — SODIUM CHLORIDE, POTASSIUM CHLORIDE, SODIUM LACTATE AND CALCIUM CHLORIDE: 600; 310; 30; 20 INJECTION, SOLUTION INTRAVENOUS at 12:57

## 2024-05-01 RX ADMIN — LIDOCAINE 2 PATCH: 4 PATCH TOPICAL at 09:10

## 2024-05-01 RX ADMIN — INSULIN ASPART 1 UNITS: 100 INJECTION, SOLUTION INTRAVENOUS; SUBCUTANEOUS at 09:32

## 2024-05-01 RX ADMIN — MORPHINE SULFATE 4 MG: 2 INJECTION, SOLUTION INTRAMUSCULAR; INTRAVENOUS at 20:29

## 2024-05-01 RX ADMIN — INSULIN ASPART 1 UNITS: 100 INJECTION, SOLUTION INTRAVENOUS; SUBCUTANEOUS at 16:15

## 2024-05-01 RX ADMIN — CYCLOBENZAPRINE 10 MG: 10 TABLET, FILM COATED ORAL at 16:00

## 2024-05-01 RX ADMIN — INSULIN ASPART 1 UNITS: 100 INJECTION, SOLUTION INTRAVENOUS; SUBCUTANEOUS at 05:06

## 2024-05-01 RX ADMIN — FAMOTIDINE 20 MG: 10 INJECTION, SOLUTION INTRAVENOUS at 20:39

## 2024-05-01 RX ADMIN — INSULIN ASPART 1 UNITS: 100 INJECTION, SOLUTION INTRAVENOUS; SUBCUTANEOUS at 12:46

## 2024-05-01 RX ADMIN — LIDOCAINE HYDROCHLORIDE 10 ML: 10 INJECTION, SOLUTION EPIDURAL; INFILTRATION; INTRACAUDAL; PERINEURAL at 15:06

## 2024-05-01 RX ADMIN — VANCOMYCIN HYDROCHLORIDE 125 MG: 125 CAPSULE ORAL at 09:07

## 2024-05-01 RX ADMIN — INSULIN ASPART 1 UNITS: 100 INJECTION, SOLUTION INTRAVENOUS; SUBCUTANEOUS at 01:12

## 2024-05-01 RX ADMIN — HEPARIN SODIUM 5000 UNITS: 5000 INJECTION, SOLUTION INTRAVENOUS; SUBCUTANEOUS at 03:27

## 2024-05-01 RX ADMIN — LIDOCAINE HYDROCHLORIDE 5 ML: 10 INJECTION, SOLUTION EPIDURAL; INFILTRATION; INTRACAUDAL; PERINEURAL at 15:06

## 2024-05-01 RX ADMIN — CYCLOBENZAPRINE 10 MG: 10 TABLET, FILM COATED ORAL at 09:07

## 2024-05-01 RX ADMIN — INSULIN ASPART 1 UNITS: 100 INJECTION, SOLUTION INTRAVENOUS; SUBCUTANEOUS at 21:05

## 2024-05-01 RX ADMIN — MORPHINE SULFATE 4 MG: 2 INJECTION, SOLUTION INTRAMUSCULAR; INTRAVENOUS at 09:42

## 2024-05-01 RX ADMIN — FAMOTIDINE 20 MG: 10 INJECTION, SOLUTION INTRAVENOUS at 09:07

## 2024-05-01 RX ADMIN — LIDOCAINE HYDROCHLORIDE 10 ML: 10 INJECTION, SOLUTION EPIDURAL; INFILTRATION; INTRACAUDAL; PERINEURAL at 14:38

## 2024-05-01 ASSESSMENT — ACTIVITIES OF DAILY LIVING (ADL)
ADLS_ACUITY_SCORE: 31
ADLS_ACUITY_SCORE: 29
ADLS_ACUITY_SCORE: 29
ADLS_ACUITY_SCORE: 25
ADLS_ACUITY_SCORE: 27
ADLS_ACUITY_SCORE: 29
ADLS_ACUITY_SCORE: 29
ADLS_ACUITY_SCORE: 25
ADLS_ACUITY_SCORE: 29
ADLS_ACUITY_SCORE: 25
ADLS_ACUITY_SCORE: 31
ADLS_ACUITY_SCORE: 31
ADLS_ACUITY_SCORE: 25
ADLS_ACUITY_SCORE: 29
ADLS_ACUITY_SCORE: 25
ADLS_ACUITY_SCORE: 31
ADLS_ACUITY_SCORE: 25
ADLS_ACUITY_SCORE: 31
ADLS_ACUITY_SCORE: 27
ADLS_ACUITY_SCORE: 31
ADLS_ACUITY_SCORE: 29
ADLS_ACUITY_SCORE: 29
ADLS_ACUITY_SCORE: 31

## 2024-05-01 NOTE — PLAN OF CARE
"Goal Outcome Evaluation:      Plan of Care Reviewed With: patient, spouse    Overall Patient Progress: no changeOverall Patient Progress: no change    Date & Time: 5/1/24 0751-6905  Behavior & Aggression: yellow  Fall Risk: yes  Orientation:A&OX4 when asked specific questions, however frequent confused conversation, commenting on things that haven't happened/unrelated to conversation.  States \"I just feel tired today.\"  ABNL VS/O2:VSS on RA  ABNL Labs: K, Mag, Phos protocol on wnl today.  BG covered as ordered, see EMAR.  Pain Management: pain controlled with scheduled robaxin and MS IV times 1.  Bowel: Abd rounded/distended, positive BS, positive flatus/stool through ostomy.  Denies nausea. NGT to LIS, draining brown/green output.   Bladder: Voiding in bathroom frequently.  Drains: MARIA ISABEL drain with small amounts serosang drainage.   Wounds/incisions:  Abd incision with staples, lower portion opened/packed with saline soaked gauze per order.  3 bandaid sites on abdomen from IR today.  Diet:NPO with ice, TPN infusing.   Activity Level: up ambulating in room and hallway with assist of 1, GB/walker.   Tests/Procedures: CT scan and IR today.   Significant Information: wife and son here today.              "

## 2024-05-01 NOTE — PROGRESS NOTES
Colon and Rectal Surgery  Daily Progress Note    Subjective  Patient reports doing well. Ambulating. Pain controlled. Has some gas and stool in bag. Clamp trial yesterday with high residual (600 ml).     Objective  Intake/Output last 24 hrs:  Temp:  [98.7  F (37.1  C)-99.1  F (37.3  C)] 98.7  F (37.1  C)  Pulse:  [77-78] 77  Resp:  [16-18] 18  BP: (142-150)/(69-77) 142/69  SpO2:  [93 %-94 %] 93 %         Physical Exam:  General: awake, alert, in no acute distress  Respiratory: non-labored breathing  Abdomen: soft, appropriately tender, mildly-distended              Incisions: staples in place. Inferior aspect of the wound opened and packed. Less drainage than yesterday               Drains: MARIA ISABEL with serosanguinous output   Ostomy: Pink, with stool and gas in the bag    Pertinent Labs  Lab Results: personally reviewed.  Lab Results   Component Value Date     05/01/2024     04/30/2024     04/29/2024     10/17/2019     09/14/2010     09/13/2010    CO2 26 05/01/2024    CO2 26 04/30/2024    CO2 24 04/29/2024    CO2 26 10/17/2019    CO2 27 09/14/2010    CO2 25 09/13/2010    BUN 9.0 05/01/2024    BUN 6.7 04/30/2024    BUN 9.8 04/29/2024    BUN 17 10/17/2019    BUN 16 09/14/2010    BUN 14 09/13/2010     Lab Results   Component Value Date    WBC 11.0 04/30/2024    WBC 12.0 04/27/2024    WBC 12.4 04/25/2024    WBC 6.6 10/17/2019    WBC 8.9 09/14/2010    WBC 9.8 09/13/2010    HGB 9.3 04/30/2024    HGB 10.1 04/27/2024    HGB 11.0 04/25/2024    HGB 17.2 10/17/2019    HGB 16.6 09/14/2010    HGB 16.3 09/13/2010    HCT 27.7 04/30/2024    HCT 30.5 04/27/2024    HCT 32.2 04/25/2024    HCT 49.0 10/17/2019    HCT 48.9 09/14/2010    HCT 48.2 09/13/2010    MCV 88 04/30/2024    MCV 88 04/27/2024    MCV 87 04/25/2024    MCV 94 10/17/2019    MCV 96 09/14/2010    MCV 97 09/13/2010     04/30/2024     04/27/2024     04/25/2024     10/17/2019     09/14/2010      09/13/2010       Assessment/Plan: Castillo Alba is a 68 year old man POD 7 s/p exploratory laparotomy with takedown of colorectal anastomosis and creation of an end colostomy for management of an anastomotic leak due to a pelvic hematoma.      - NGT with high output but with some bowel function, and failed clamp trial yesterday -> CT abdomen and pelvis with PO and IV to eval for undrained collections causing ileus   - Continue TPN.   - NPO for now  - Multimodal pain regimen   - Ambulate. PT/OT  - IS  - Maintain MARIA ISABEL   - Continue vancomycin for c. Diff prohylaxis   - SQH for DVT ppx   - WOCN teaching. Reagan need HH arranged      Will be Discussed with Dr. Anthony Smith MD on 5/1/2024 at 7:33 AM   Colon and rectal surgery fellow

## 2024-05-01 NOTE — CONSULTS
IR consult received. Will attempt US guided aspiration and possible drain placement of the larger abdominal fluid collections, hopefully this afternoon.    Javier Mejia MD

## 2024-05-01 NOTE — PROGRESS NOTES
"[facility] Nurse Inpatient Ostomy Assessment     Assessment of new end Colostomy     Surgery date:  4-24-24  Surgeon:  Anthony  Procedure:  EXPLORATORY LAPAROTOMY, RESECTION OF COLORECTAL ANASTAMOIS, ABDOMINAL WASHOUT, END COLOSTOMY,   Related diagnosis: for anastomotic leak     WO Assessment & Physical Exam:      Current status: Pt returned from IR procedure. Up in chair.  Noted to asking a question but speech not clear.         Wife voicing concern that pt mentation is worsening and he is twitchy.        Date of last photo: 4-26-24                   Stoma location: LUQ  Stoma size:  1 1/2\" sligtly oval ,   Stoma appearance:  red, slightly protrudes  Mucocutaneous junction: Not assessed today. Barrier intact   Peristomal  skin: not assessed today. Barrier intact   Abdominal assessment:  distended and firm. Bandaids to IR procedure sites  Surgical site(s): dressing to distal incision. Remainder incision well approximated with staples  NG still in place? Yes   Output: 25cc brown semi-liquid output in appliance  Pain: 4   Is patient still on a PCA? Yes    Psychosocial: wife supportive.     WOC Assessment          End colostomy: viable. GI function returning        Rose-stomal skin: no complications noted        MCJ: intact with sutures. NO local s/s infection        Pouching system:  Intact.  continue convexity.         Psychosocial: wife supportive and involved.         Education:Teaching initiated with wife and patient.     Objective Data:     Patient history according to medical record:         Castillo Alba is a 68 year old male, seen at the request of Dr. Rubio, who presents with worsening abdominal pain, fatigue and rectal bleeding.  He underwent a robotic assisted left colectomy with colorectal anastomosis 1 week ago for management of complicated diverticulitis.  He did have a significantly inflamed colon at the time of surgery, as well as a small bowel loop that was adherent " to the inflamed colon.  He was doing well until Monday when he developed worsening abdominal pain.  He also noticed blood in his stool.  Given that he did not have significant improvement in his symptoms, he presented to the ER for evaluation.  On presentation, he was hemodynamically stable, with a mild leukocytosis, normal hemoglobin and otherwise relatively unremarkable labs.  He underwent a CT scan of the abdomen and pelvis which shows a large amount of free air in the upper abdomen and concern for a possible intra-abdominal fluid collection near his colorectal anastomosis.     Current Diet/Nutrition:  TPN   initiated   Orders Placed This Encounter      NPO for Medical/Clinical Reasons Except for: Meds, Ice Chips     Output:  I/O last 3 completed shifts:  In: 1920 [P.O.:200; I.V.:220; NG/GT:900]  Out: 3185 [Urine:1470; Emesis/NG output:1500; Drains:15; Stool:200]    Labs:   Recent Labs   Lab 05/01/24  0507 04/30/24  0527 04/29/24  0337   ALBUMIN 2.7*  --  2.4*   PREALB  --   --  3.8*   HGB  --  9.3*  --    INR  --   --  1.26*   WBC  --  11.0  --        Jerald Risk Assessment:   Sensory Perception: 4-->no impairment  Moisture: 4-->rarely moist  Activity: 3-->walks occasionally  Mobility: 3-->slightly limited  Nutrition: 3-->adequate  Friction and Shear: 3-->no apparent problem  Jerald Score: 20      WOC Interventions:     Patient's chart evaluated  Focus of today's visit:  check appliance, answer questions   Pouch changed: Gwynn NI convex barier with ring and drainable pouch  Participant(s) in teaching session today: Pt and wife  Supplies: placed @ bedside   Preparation for discharge: TBD  Registered for supply samples? TBD  Discussed plan of care with: Pt and wife   WOC follow-up: Will meet with patient on TH am      Kaya Beaulieu RN, CWOCN  -Securely message with [employer] (more info) - can reach individually by name or search 'WOC Nurse' ([place]) to reach all current WOCs on duty.  -WOC Office Phone:  [phone]

## 2024-05-01 NOTE — PLAN OF CARE
Date & Time: 1900-0730.  Surgery/POD#: POD 7 from a Resection of colorectal anastomosis w/ abdominal washout &  end colostomy placement.  Behavior & Aggression: Green - can be impulsive at times.  Fall Risk: Yes.  Orientation: A&Ox4.  ABNL VS/O2: VSS on 2-3 L of o2 overnight.  ABNL Labs: see chart. On K+, Mag, & Phos protocols, recheck in am labs. BGs were 145,146,151.  Pain Management: Scheduled Robaxin & Tylenol.  Bowel/Bladder: Voiding frequently via urinal, Colostomy intact w/ no output, burped bag-lots of gas.   IV/Drains: R PICC infusing LR @ 25ml/hr & TPN/lipids @ 75ml/hr. NG to LIS. MARIA ISABEL in place.  Wounds/Incisions/Skin: Abdominal incisions are C/D/I.  Diet: NPO w/ ice chips.  Activity Level: Ax1 w/ GB/WK.  Anticipated DC Date: TBD.

## 2024-05-02 ENCOUNTER — APPOINTMENT (OUTPATIENT)
Dept: OCCUPATIONAL THERAPY | Facility: CLINIC | Age: 69
DRG: 329 | End: 2024-05-02
Payer: COMMERCIAL

## 2024-05-02 ENCOUNTER — APPOINTMENT (OUTPATIENT)
Dept: PHYSICAL THERAPY | Facility: CLINIC | Age: 69
DRG: 329 | End: 2024-05-02
Payer: COMMERCIAL

## 2024-05-02 LAB
ERYTHROCYTE [DISTWIDTH] IN BLOOD BY AUTOMATED COUNT: 17.6 % (ref 10–15)
GLUCOSE BLDC GLUCOMTR-MCNC: 138 MG/DL (ref 70–99)
GLUCOSE BLDC GLUCOMTR-MCNC: 139 MG/DL (ref 70–99)
GLUCOSE BLDC GLUCOMTR-MCNC: 142 MG/DL (ref 70–99)
GLUCOSE BLDC GLUCOMTR-MCNC: 152 MG/DL (ref 70–99)
GLUCOSE BLDC GLUCOMTR-MCNC: 171 MG/DL (ref 70–99)
GLUCOSE BLDC GLUCOMTR-MCNC: 177 MG/DL (ref 70–99)
GLUCOSE SERPL-MCNC: 160 MG/DL (ref 70–99)
HCT VFR BLD AUTO: 29.1 % (ref 40–53)
HGB BLD-MCNC: 9.7 G/DL (ref 13.3–17.7)
MAGNESIUM SERPL-MCNC: 2.1 MG/DL (ref 1.7–2.3)
MCH RBC QN AUTO: 29.1 PG (ref 26.5–33)
MCHC RBC AUTO-ENTMCNC: 33.3 G/DL (ref 31.5–36.5)
MCV RBC AUTO: 87 FL (ref 78–100)
PHOSPHATE SERPL-MCNC: 3.3 MG/DL (ref 2.5–4.5)
PLATELET # BLD AUTO: 773 10E3/UL (ref 150–450)
POTASSIUM SERPL-SCNC: 3.9 MMOL/L (ref 3.4–5.3)
RBC # BLD AUTO: 3.33 10E6/UL (ref 4.4–5.9)
WBC # BLD AUTO: 15.7 10E3/UL (ref 4–11)

## 2024-05-02 PROCEDURE — 97535 SELF CARE MNGMENT TRAINING: CPT | Mod: GO

## 2024-05-02 PROCEDURE — 250N000009 HC RX 250: Mod: JZ

## 2024-05-02 PROCEDURE — 250N000011 HC RX IP 250 OP 636: Performed by: PHYSICIAN ASSISTANT

## 2024-05-02 PROCEDURE — 250N000013 HC RX MED GY IP 250 OP 250 PS 637: Performed by: COLON & RECTAL SURGERY

## 2024-05-02 PROCEDURE — G0463 HOSPITAL OUTPT CLINIC VISIT: HCPCS

## 2024-05-02 PROCEDURE — 97530 THERAPEUTIC ACTIVITIES: CPT | Mod: GP | Performed by: PHYSICAL THERAPY ASSISTANT

## 2024-05-02 PROCEDURE — 250N000009 HC RX 250: Performed by: COLON & RECTAL SURGERY

## 2024-05-02 PROCEDURE — 82947 ASSAY GLUCOSE BLOOD QUANT: CPT | Performed by: UROLOGY

## 2024-05-02 PROCEDURE — 84132 ASSAY OF SERUM POTASSIUM: CPT | Performed by: COLON & RECTAL SURGERY

## 2024-05-02 PROCEDURE — 85027 COMPLETE CBC AUTOMATED: CPT | Performed by: COLON & RECTAL SURGERY

## 2024-05-02 PROCEDURE — 120N000001 HC R&B MED SURG/OB

## 2024-05-02 PROCEDURE — 250N000011 HC RX IP 250 OP 636: Performed by: COLON & RECTAL SURGERY

## 2024-05-02 PROCEDURE — 97116 GAIT TRAINING THERAPY: CPT | Mod: GP | Performed by: PHYSICAL THERAPY ASSISTANT

## 2024-05-02 PROCEDURE — 83735 ASSAY OF MAGNESIUM: CPT | Performed by: COLON & RECTAL SURGERY

## 2024-05-02 PROCEDURE — 84100 ASSAY OF PHOSPHORUS: CPT | Performed by: COLON & RECTAL SURGERY

## 2024-05-02 PROCEDURE — B4185 PARENTERAL SOL 10 GM LIPIDS: HCPCS | Mod: JZ

## 2024-05-02 RX ADMIN — HEPARIN SODIUM 5000 UNITS: 5000 INJECTION, SOLUTION INTRAVENOUS; SUBCUTANEOUS at 21:35

## 2024-05-02 RX ADMIN — INSULIN ASPART 1 UNITS: 100 INJECTION, SOLUTION INTRAVENOUS; SUBCUTANEOUS at 16:39

## 2024-05-02 RX ADMIN — INSULIN ASPART 1 UNITS: 100 INJECTION, SOLUTION INTRAVENOUS; SUBCUTANEOUS at 04:52

## 2024-05-02 RX ADMIN — MAGNESIUM SULFATE HEPTAHYDRATE: 500 INJECTION, SOLUTION INTRAMUSCULAR; INTRAVENOUS at 21:11

## 2024-05-02 RX ADMIN — INSULIN ASPART 1 UNITS: 100 INJECTION, SOLUTION INTRAVENOUS; SUBCUTANEOUS at 00:57

## 2024-05-02 RX ADMIN — CYCLOBENZAPRINE 10 MG: 10 TABLET, FILM COATED ORAL at 10:00

## 2024-05-02 RX ADMIN — LIDOCAINE 2 PATCH: 4 PATCH TOPICAL at 10:01

## 2024-05-02 RX ADMIN — FAMOTIDINE 20 MG: 10 INJECTION, SOLUTION INTRAVENOUS at 10:00

## 2024-05-02 RX ADMIN — ACETAMINOPHEN 650 MG: 325 TABLET, FILM COATED ORAL at 19:21

## 2024-05-02 RX ADMIN — CYCLOBENZAPRINE 10 MG: 10 TABLET, FILM COATED ORAL at 21:35

## 2024-05-02 RX ADMIN — FAMOTIDINE 20 MG: 10 INJECTION, SOLUTION INTRAVENOUS at 21:34

## 2024-05-02 RX ADMIN — CYCLOBENZAPRINE 10 MG: 10 TABLET, FILM COATED ORAL at 16:38

## 2024-05-02 RX ADMIN — OLIVE OIL AND SOYBEAN OIL 250 ML: 16; 4 INJECTION, EMULSION INTRAVENOUS at 21:11

## 2024-05-02 RX ADMIN — ACETAMINOPHEN 650 MG: 325 TABLET, FILM COATED ORAL at 10:01

## 2024-05-02 RX ADMIN — INSULIN ASPART 1 UNITS: 100 INJECTION, SOLUTION INTRAVENOUS; SUBCUTANEOUS at 09:57

## 2024-05-02 ASSESSMENT — ACTIVITIES OF DAILY LIVING (ADL)
ADLS_ACUITY_SCORE: 29
ADLS_ACUITY_SCORE: 29
ADLS_ACUITY_SCORE: 25
ADLS_ACUITY_SCORE: 25
ADLS_ACUITY_SCORE: 29
ADLS_ACUITY_SCORE: 26
ADLS_ACUITY_SCORE: 26
ADLS_ACUITY_SCORE: 25
ADLS_ACUITY_SCORE: 26
ADLS_ACUITY_SCORE: 29
ADLS_ACUITY_SCORE: 26
ADLS_ACUITY_SCORE: 26
ADLS_ACUITY_SCORE: 29
ADLS_ACUITY_SCORE: 26
ADLS_ACUITY_SCORE: 29
ADLS_ACUITY_SCORE: 25

## 2024-05-02 NOTE — PLAN OF CARE
Goal Outcome Evaluation:    Plan of Care Reviewed With: patient     Overall Patient Progress: no changeOverall Patient Progress: no change     Shift: 5/2/24  3315-0933  Surgery/POD#: POD 8 from an ex lap, resectionof colorectal anastamosis, abd washout, end colostomy. POD 1 of IR fluid collection drainage.  Behavior & Aggression: Green  Fall Risk: Yes  Orientation: A&O x4, but very forgetful & int confused   ABNL VS/O2: VSS on RA  Tele: NA  ABNL Labs: K, Mg, & Phos replacement protocol; glucoses: 171, 138 & 142  Pain Management: scheduled flexeril, prn PO Tylenol x2 for incisional pain  Bowel/Bladder: voids frequently in BR/bedside urinal, bowel sounds normoactive, +f/+s in colostomy  Drains: MARIA ISABEL to bulb suction, colostomy, NGT d/c'd; started on Clear liquids this shift   Lines: PICC infusing TPN, good blood return  Skin: mid abdominal incision w/ staples, lower portion opened - dressing change BID per orders; dressing changed this AM by Colorectal Surgery; x3 band-aids from IR  Diet: Clear liquids; denies N/V  Activity Level: x1 gb, walker  Tests/Procedures: NA  Anticipated  DC Date: pending, [facility]   Significant Information:   CRS, WOC, SW following.   Pt very forgetful.  Sitter at bedside.

## 2024-05-02 NOTE — PROVIDER NOTIFICATION
Colorectal Surgery text paged regarding pt's NG tube output bein after hooking patient back to LIS from being clamped was 170 ml green output.

## 2024-05-02 NOTE — PROGRESS NOTES
Colon and Rectal Surgery  Daily Progress Note    Subjective  Patient reports doing well. Ambulating. Pain controlled. Had fluid collections aspirated yesterday. NGT still high output. More output from stoma.     Objective  Intake/Output last 24 hrs:  Temp:  [98.1  F (36.7  C)-98.8  F (37.1  C)] 98.7  F (37.1  C)  Pulse:  [73-82] 76  Resp:  [15-19] 15  BP: (119-145)/(67-74) 145/74  SpO2:  [92 %-97 %] 95 %         Physical Exam:  General: awake, alert, in no acute distress  Respiratory: non-labored breathing  Abdomen: soft, appropriately tender, non-distended              Incisions: clean, dry and intact              Drains: MARIA ISABEL with serosang ouytput   Ostomy:  Pink, with stool and gas in the bag    Pertinent Labs  Lab Results: personally reviewed.  Lab Results   Component Value Date     05/01/2024     04/30/2024     04/29/2024     10/17/2019     09/14/2010     09/13/2010    CO2 26 05/01/2024    CO2 26 04/30/2024    CO2 24 04/29/2024    CO2 26 10/17/2019    CO2 27 09/14/2010    CO2 25 09/13/2010    BUN 9.0 05/01/2024    BUN 6.7 04/30/2024    BUN 9.8 04/29/2024    BUN 17 10/17/2019    BUN 16 09/14/2010    BUN 14 09/13/2010     Lab Results   Component Value Date    WBC 15.7 05/02/2024    WBC 11.0 04/30/2024    WBC 12.0 04/27/2024    WBC 6.6 10/17/2019    WBC 8.9 09/14/2010    WBC 9.8 09/13/2010    HGB 9.7 05/02/2024    HGB 9.3 04/30/2024    HGB 10.1 04/27/2024    HGB 17.2 10/17/2019    HGB 16.6 09/14/2010    HGB 16.3 09/13/2010    HCT 29.1 05/02/2024    HCT 27.7 04/30/2024    HCT 30.5 04/27/2024    HCT 49.0 10/17/2019    HCT 48.9 09/14/2010    HCT 48.2 09/13/2010    MCV 87 05/02/2024    MCV 88 04/30/2024    MCV 88 04/27/2024    MCV 94 10/17/2019    MCV 96 09/14/2010    MCV 97 09/13/2010     05/02/2024     04/30/2024     04/27/2024     10/17/2019     09/14/2010     09/13/2010       Assessment/Plan: Castillo Alba is a 68 year old man POD 7  s/p exploratory laparotomy with takedown of colorectal anastomosis and creation of an end colostomy for management of an anastomotic leak due to a pelvic hematoma.      - NGT with high output but  now s/p aspiration of fluid collection that was sitting next to stomach. Will attempt clamp trial today   - BID wound cares to inferior aspect of the midline wound   - Continue TPN.   - NPO for now  - Multimodal pain regimen   - Ambulate. PT/OT  - IS  - Maintain MARIA ISABEL   - Continue vancomycin for c. Diff prohylaxis   - SQH for DVT ppx   - WOCN teaching. Reagan need HH arranged      Will be Discussed with Dr. Anthony Smith MD on 5/2/2024 at 9:13 AM   Colon and rectal surgery fellow

## 2024-05-02 NOTE — PLAN OF CARE
Goal Outcome Evaluation:      Plan of Care Reviewed With: patient    Overall Patient Progress: no changeOverall Patient Progress: no change    Shift: 7p-7a  Surgery/POD#: POD 8 from an ex lap, resectionof colorectal anastamosis, abd washout, end colostomy. POD 1 of IR fluid collection drainage.  Behavior & Aggression: Green  Fall Risk: Yes  Orientation: A&O x4, but very forgetful & int confused   ABNL VS/O2: VSS on RA  Tele: NA  ABNL Labs: K, Mg, & Phos replacement protocol, rechecks this AM; glucoses: 154, 152, & 177  Pain Management: IV morphine x1, scheduled flexeril  Bowel/Bladder: voids frequently in BR/bedside urinal, bowel sounds normoactive, +f/+s in colostomy  Drains: MARIA ISABEL to bulb suction, colostomy, NGT to LIS   Lines: PICC infusing TPN/Lipids, good blood return  Skin: mid abdominal incision w/ staples, lower portion opened - dressing change BID per orders; x3 band-aids from IR  Diet: NPO, ex meds/1 c ice chips per shift; denies N/V  Activity Level: x1 gb, walker  Tests/Procedures: NA  Anticipated  DC Date: pending, [facility]   Significant Information:   CRS, WOC, SW following.   Pt very forgetful, forgets to ask for help and frequently set of bed alarm overnight.

## 2024-05-02 NOTE — PROGRESS NOTES
"[facility] Nurse Inpatient Ostomy Assessment     Assessment of new end Colostomy     Surgery date:  4-24-24  Surgeon:  Anthony  Procedure:  EXPLORATORY LAPAROTOMY, RESECTION OF COLORECTAL ANASTAMOIS, ABDOMINAL WASHOUT, END COLOSTOMY,   Related diagnosis: for anastomotic leak     WO Assessment & Physical Exam:      Current status: Pt up in chair.  Sitter present. Unless spoken to, not engaged. No observation of appliance change for questions voiced. NG remains  in place.     Date of last photo: LUQ colostomy                   Stoma location: LUQ  Stoma size:  1 1/2\" sligtly oval ,   Stoma appearance:  red, slightly protrudes  Mucocutaneous junction:  intact with sutures.   Peristomal  skin: intact, no erythema  Abdominal assessment:  appears less distended and semi- firm. Bandaids to IR procedure sites intact.   Surgical site(s): dressing to distal incision. Remainder incision well approximated with staples  NG still in place? Yes   Output: 50ccc dark green bile looking output in appliance  Pain: 4   Is patient still on a PCA?     Psychosocial: wife supportive bu not present. Pt had to work today     [facility] Assessment          End colostomy: viable. GI function returning        Rose-stomal skin: no complications noted        MCJ: intact with sutures. NO local s/s infection        Pouching system:  No leakage.  continue convexity.         Psychosocial: wife supportive and involved.         Education:TPt not engaged today for teaching    Objective Data:     Patient history according to medical record:         Castillo Alba is a 68 year old male, seen at the request of Dr. Rubio, who presents with worsening abdominal pain, fatigue and rectal bleeding.  He underwent a robotic assisted left colectomy with colorectal anastomosis 1 week ago for management of complicated diverticulitis.  He did have a significantly inflamed colon at the time of surgery, as well as a small bowel loop that was " adherent to the inflamed colon.  He was doing well until Monday when he developed worsening abdominal pain.  He also noticed blood in his stool.  Given that he did not have significant improvement in his symptoms, he presented to the ER for evaluation.  On presentation, he was hemodynamically stable, with a mild leukocytosis, normal hemoglobin and otherwise relatively unremarkable labs.  He underwent a CT scan of the abdomen and pelvis which shows a large amount of free air in the upper abdomen and concern for a possible intra-abdominal fluid collection near his colorectal anastomosis.     Current Diet/Nutrition:  TPN   initiated   Orders Placed This Encounter      Clear Liquid Diet     Output:  I/O last 3 completed shifts:  In: 1724 [P.O.:25; I.V.:235]  Out: 2160 [Urine:500; Emesis/NG output:1445; Drains:15; Stool:200]    Labs:   Recent Labs   Lab 05/02/24  0546 05/01/24  0507 04/30/24  0527 04/29/24  0337   ALBUMIN  --  2.7*  --  2.4*   PREALB  --   --   --  3.8*   HGB 9.7*  --    < >  --    INR  --   --   --  1.26*   WBC 15.7*  --    < >  --     < > = values in this interval not displayed.       Jerald Risk Assessment:   Sensory Perception: 4-->no impairment  Moisture: 4-->rarely moist  Activity: 3-->walks occasionally  Mobility: 3-->slightly limited  Nutrition: 3-->adequate  Friction and Shear: 3-->no apparent problem  Jerald Score: 20      WOC Interventions:     Patient's chart evaluated  Focus of today's visit:  Routine pouch change. Stoma and applinace assessment  Pouch changed: Lauro NI convex barier with ring and drainable pouch  Participant(s) in teaching session today: Attempted pt but not really engaged. Shown how to prep pouch  Supplies: placed @ bedside   Preparation for discharge: TBD  Registered for supply samples? TBD  Discussed plan of care with: Pt   WOC follow-up: On Monday for next teaching session     Kaya Beaulieu RN, CWOCN  -Securely message with "[employer] (more info) - can reach  individually by name or search 'WOC Nurse' ([place]) to reach all current WOCs on duty.  -WOC Office Phone: [phone]

## 2024-05-03 ENCOUNTER — APPOINTMENT (OUTPATIENT)
Dept: PHYSICAL THERAPY | Facility: CLINIC | Age: 69
DRG: 329 | End: 2024-05-03
Payer: COMMERCIAL

## 2024-05-03 LAB
ANION GAP SERPL CALCULATED.3IONS-SCNC: 12 MMOL/L (ref 7–15)
BUN SERPL-MCNC: 14.1 MG/DL (ref 8–23)
CALCIUM SERPL-MCNC: 8.2 MG/DL (ref 8.8–10.2)
CHLORIDE SERPL-SCNC: 100 MMOL/L (ref 98–107)
CREAT SERPL-MCNC: 0.59 MG/DL (ref 0.67–1.17)
DEPRECATED HCO3 PLAS-SCNC: 23 MMOL/L (ref 22–29)
EGFRCR SERPLBLD CKD-EPI 2021: >90 ML/MIN/1.73M2
ERYTHROCYTE [DISTWIDTH] IN BLOOD BY AUTOMATED COUNT: 17.5 % (ref 10–15)
GLUCOSE BLDC GLUCOMTR-MCNC: 112 MG/DL (ref 70–99)
GLUCOSE BLDC GLUCOMTR-MCNC: 134 MG/DL (ref 70–99)
GLUCOSE BLDC GLUCOMTR-MCNC: 134 MG/DL (ref 70–99)
GLUCOSE BLDC GLUCOMTR-MCNC: 142 MG/DL (ref 70–99)
GLUCOSE BLDC GLUCOMTR-MCNC: 152 MG/DL (ref 70–99)
GLUCOSE BLDC GLUCOMTR-MCNC: 180 MG/DL (ref 70–99)
GLUCOSE SERPL-MCNC: 152 MG/DL (ref 70–99)
HCT VFR BLD AUTO: 28.1 % (ref 40–53)
HGB BLD-MCNC: 9.3 G/DL (ref 13.3–17.7)
MAGNESIUM SERPL-MCNC: 2.2 MG/DL (ref 1.7–2.3)
MCH RBC QN AUTO: 28.8 PG (ref 26.5–33)
MCHC RBC AUTO-ENTMCNC: 33.1 G/DL (ref 31.5–36.5)
MCV RBC AUTO: 87 FL (ref 78–100)
PHOSPHATE SERPL-MCNC: 3.6 MG/DL (ref 2.5–4.5)
PLATELET # BLD AUTO: 767 10E3/UL (ref 150–450)
PLATELET # BLD AUTO: 767 10E3/UL (ref 150–450)
POTASSIUM SERPL-SCNC: 4 MMOL/L (ref 3.4–5.3)
RBC # BLD AUTO: 3.23 10E6/UL (ref 4.4–5.9)
SODIUM SERPL-SCNC: 135 MMOL/L (ref 135–145)
WBC # BLD AUTO: 14.3 10E3/UL (ref 4–11)

## 2024-05-03 PROCEDURE — 250N000013 HC RX MED GY IP 250 OP 250 PS 637: Performed by: COLON & RECTAL SURGERY

## 2024-05-03 PROCEDURE — 250N000013 HC RX MED GY IP 250 OP 250 PS 637

## 2024-05-03 PROCEDURE — 85041 AUTOMATED RBC COUNT: CPT | Performed by: COLON & RECTAL SURGERY

## 2024-05-03 PROCEDURE — 85049 AUTOMATED PLATELET COUNT: CPT | Performed by: COLON & RECTAL SURGERY

## 2024-05-03 PROCEDURE — 250N000011 HC RX IP 250 OP 636: Performed by: PHYSICIAN ASSISTANT

## 2024-05-03 PROCEDURE — 80048 BASIC METABOLIC PNL TOTAL CA: CPT

## 2024-05-03 PROCEDURE — 83735 ASSAY OF MAGNESIUM: CPT

## 2024-05-03 PROCEDURE — 84100 ASSAY OF PHOSPHORUS: CPT

## 2024-05-03 PROCEDURE — 120N000001 HC R&B MED SURG/OB

## 2024-05-03 PROCEDURE — 250N000011 HC RX IP 250 OP 636: Performed by: COLON & RECTAL SURGERY

## 2024-05-03 PROCEDURE — 97116 GAIT TRAINING THERAPY: CPT | Mod: GP | Performed by: PHYSICAL THERAPY ASSISTANT

## 2024-05-03 PROCEDURE — 250N000009 HC RX 250: Performed by: COLON & RECTAL SURGERY

## 2024-05-03 PROCEDURE — 97530 THERAPEUTIC ACTIVITIES: CPT | Mod: GP | Performed by: PHYSICAL THERAPY ASSISTANT

## 2024-05-03 RX ORDER — DEXTROSE MONOHYDRATE 100 MG/ML
INJECTION, SOLUTION INTRAVENOUS CONTINUOUS PRN
Status: DISCONTINUED | OUTPATIENT
Start: 2024-05-03 | End: 2024-05-07 | Stop reason: HOSPADM

## 2024-05-03 RX ORDER — OXYCODONE HYDROCHLORIDE 5 MG/1
5 TABLET ORAL EVERY 6 HOURS PRN
Status: DISCONTINUED | OUTPATIENT
Start: 2024-05-03 | End: 2024-05-07 | Stop reason: HOSPADM

## 2024-05-03 RX ORDER — CYCLOBENZAPRINE HCL 10 MG
10 TABLET ORAL EVERY 8 HOURS PRN
Status: DISCONTINUED | OUTPATIENT
Start: 2024-05-03 | End: 2024-05-04

## 2024-05-03 RX ADMIN — ACETAMINOPHEN 650 MG: 325 TABLET, FILM COATED ORAL at 05:59

## 2024-05-03 RX ADMIN — ACETAMINOPHEN 650 MG: 325 TABLET, FILM COATED ORAL at 21:53

## 2024-05-03 RX ADMIN — INSULIN ASPART 1 UNITS: 100 INJECTION, SOLUTION INTRAVENOUS; SUBCUTANEOUS at 09:18

## 2024-05-03 RX ADMIN — INSULIN ASPART 1 UNITS: 100 INJECTION, SOLUTION INTRAVENOUS; SUBCUTANEOUS at 00:22

## 2024-05-03 RX ADMIN — HEPARIN SODIUM 5000 UNITS: 5000 INJECTION, SOLUTION INTRAVENOUS; SUBCUTANEOUS at 20:54

## 2024-05-03 RX ADMIN — HEPARIN SODIUM 5000 UNITS: 5000 INJECTION, SOLUTION INTRAVENOUS; SUBCUTANEOUS at 12:38

## 2024-05-03 RX ADMIN — ASCORBIC ACID, VITAMIN A PALMITATE, CHOLECALCIFEROL, THIAMINE HYDROCHLORIDE, RIBOFLAVIN-5 PHOSPHATE SODIUM, PYRIDOXINE HYDROCHLORIDE, NIACINAMIDE, DEXPANTHENOL, ALPHA-TOCOPHEROL ACETATE, VITAMIN K1, FOLIC ACID, BIOTIN, CYANOCOBALAMIN: 200; 3300; 200; 6; 3.6; 6; 40; 15; 10; 150; 600; 60; 5 INJECTION, SOLUTION INTRAVENOUS at 20:54

## 2024-05-03 RX ADMIN — FAMOTIDINE 20 MG: 10 INJECTION, SOLUTION INTRAVENOUS at 09:14

## 2024-05-03 RX ADMIN — ACETAMINOPHEN 650 MG: 325 TABLET, FILM COATED ORAL at 12:41

## 2024-05-03 RX ADMIN — CYCLOBENZAPRINE 10 MG: 10 TABLET, FILM COATED ORAL at 09:14

## 2024-05-03 RX ADMIN — HEPARIN SODIUM 5000 UNITS: 5000 INJECTION, SOLUTION INTRAVENOUS; SUBCUTANEOUS at 04:18

## 2024-05-03 RX ADMIN — INSULIN ASPART 1 UNITS: 100 INJECTION, SOLUTION INTRAVENOUS; SUBCUTANEOUS at 04:22

## 2024-05-03 RX ADMIN — FAMOTIDINE 20 MG: 10 INJECTION, SOLUTION INTRAVENOUS at 20:53

## 2024-05-03 RX ADMIN — MELATONIN 5 MG TABLET 5 MG: at 21:53

## 2024-05-03 RX ADMIN — CYCLOBENZAPRINE 10 MG: 10 TABLET, FILM COATED ORAL at 15:52

## 2024-05-03 RX ADMIN — OXYCODONE HYDROCHLORIDE 5 MG: 5 TABLET ORAL at 20:40

## 2024-05-03 ASSESSMENT — ACTIVITIES OF DAILY LIVING (ADL)
ADLS_ACUITY_SCORE: 26
DEPENDENT_IADLS:: INDEPENDENT
ADLS_ACUITY_SCORE: 26

## 2024-05-03 NOTE — PROGRESS NOTES
COLON & RECTAL SURGERY  PROGRESS NOTE    May 3, 2024  Post-op Day # 9    SUBJECTIVE:  Doing okay. Pain controlled with tylenol and flexeril. Tolerating clears. No emesis. Stoma output 50 ml. MARIA ISABEL drain 10 ml output. AVSS.    OBJECTIVE:  Temp:  [97.5  F (36.4  C)-98.7  F (37.1  C)] 97.5  F (36.4  C)  Pulse:  [72-76] 73  Resp:  [15-16] 16  BP: (118-145)/(67-74) 135/71  SpO2:  [95 %] 95 %    Intake/Output Summary (Last 24 hours) at 5/3/2024 0730  Last data filed at 5/3/2024 0601  Gross per 24 hour   Intake 2438 ml   Output 530 ml   Net 1908 ml       GENERAL:  Awake, alert, no acute distress,   HEAD: Nomocephalic atraumatic  SCLERA: anicteric  EXTREMITIES: warm and well perfused  ABDOMEN:  Soft, appropriately tender, round, no rebound or guarding, no peritoneal signs. Ostomy pink with small amount of dark brown loose stool in appliance.  INCISION:  C/d/i, inferior opening moist with minimal exudate, no surrounding erythema. Repacked with Kerlix.     LABS:  Lab Results   Component Value Date    WBC 15.7 05/02/2024    WBC 6.6 10/17/2019     Lab Results   Component Value Date    HGB 9.7 05/02/2024    HGB 17.2 10/17/2019     Lab Results   Component Value Date    HCT 29.1 05/02/2024    HCT 49.0 10/17/2019     Lab Results   Component Value Date     05/03/2024     10/17/2019     Last Basic Metabolic Panel:  Lab Results   Component Value Date     05/03/2024     10/17/2019      Lab Results   Component Value Date    POTASSIUM 4.0 05/03/2024    POTASSIUM 3.8 10/17/2019     Lab Results   Component Value Date    CHLORIDE 100 05/03/2024    CHLORIDE 106 10/17/2019     Lab Results   Component Value Date    LENORE 8.2 05/03/2024    LENORE 9.3 10/17/2019     Lab Results   Component Value Date    CO2 23 05/03/2024    CO2 26 10/17/2019     Lab Results   Component Value Date    BUN 14.1 05/03/2024    BUN 17 10/17/2019     Lab Results   Component Value Date    CR 0.59 05/03/2024    CR 0.84 10/17/2019     Lab Results    Component Value Date     05/03/2024     05/03/2024     10/17/2019       ASSESSMENT/PLAN: Castillo Alba is a 68 year old man POD#9 s/p exploratory laparotomy with takedown of colorectal anastomosis and creation of an end colostomy for management of an anastomotic leak due to a pelvic hematoma.      - Advance to fulls  - Continue TPN until adequate oral intake.  - BID wound cares to inferior aspect of the midline wound   - Multimodal pain control  - Ambulate. Appreciate PT/OT  - Continue MARIA ISABEL drain  - SQH for DVT ppx   - WOCN teaching. Reagan need  arranged       For questions/paging, please contact the CRS office at [phone].    Rayo Sanders PA-C  [facility]  Phone: [phone]

## 2024-05-03 NOTE — PROVIDER NOTIFICATION
MD Notification    Notified Person: MD    Notified Person Name:    Notification Date/Time:05/03/24 4:55 PM      Notification Interaction: paged MD    Purpose of Notification: would like to get PO 5mg oxycodone for more moderate pain.  Is trying to avoid taking IV    Orders Received: awaiting response    Comments:

## 2024-05-03 NOTE — PLAN OF CARE
Goal Outcome Evaluation:      Plan of Care Reviewed With: patient    Overall Patient Progress: no changeOverall Patient Progress: no change    Shift: 7p-7a  Surgery/POD#: POD 9 from an ex lap, resection of colorectal anastamosis, abd washout, end colostomy.  Behavior & Aggression: Green, restless at times  Fall Risk: Yes  Orientation: A&O x4, but very forgetful  ABNL VS/O2: VSS on RA  Tele: NA  ABNL Labs: K, Mg & Ph replacement protocols, rechecks this AM  Pain Management: denies pain, scheduled flexeril  Bowel/Bladder: voiding in BR/bedside urinal, bowel sounds hypoactive, +f/+s in ostomy  Drains: RLQ MARIA ISABEL to bulb suction, colostomy  Lines: PICC infusing TPN/Lipids  Skin: x3 IR sites w/ band-aids, mid abd incision w/ staples, lower portion opened - dressing change BID per orders  Diet: clear liquids, denies N/V  Activity Level: x1 gb, walker  Tests/Procedures: NA  Anticipated  DC Date: pending, [facility]  Significant Information:   CRS, WOC, SW following.

## 2024-05-03 NOTE — CONSULTS
Care Management Initial Consult    General Information  Assessment completed with: Patient, VM-chart review, Spouse or significant other, Anastacio  Type of CM/SW Visit: Initial Assessment    Primary Care Provider verified and updated as needed: Yes   Readmission within the last 30 days: other (see comments)   Return Category: Post-op/Post-procedure complication  Reason for Consult: discharge planning  Advance Care Planning: Advance Care Planning Reviewed: no concerns identified          Communication Assessment  Patient's communication style: spoken language (English or Bilingual)    Hearing Difficulty or Deaf: no   Wear Glasses or Blind: no    Cognitive  Cognitive/Neuro/Behavioral: .WDL except, level of consciousness  Level of Consciousness: alert  Arousal Level: opens eyes spontaneously  Orientation: oriented x 4  Mood/Behavior: calm, cooperative  Best Language: 0 - No aphasia  Speech: clear    Living Environment:   People in home: spouse, child(eleno), adult     Current living Arrangements: house      Able to return to prior arrangements: yes       Family/Social Support:  Care provided by: self  Provides care for: no one  Marital Status:   Wife, Children  Rosalee       Description of Support System: Supportive, Involved    Support Assessment: Adequate family and caregiver support    Current Resources:   Patient receiving home care services: No     Community Resources: None  Equipment currently used at home: none  Supplies currently used at home:      Employment/Financial:  Employment Status: employed full-time        Financial Concerns: none   Referral to Financial Worker: No       Does the patient's insurance plan have a 3 day qualifying hospital stay waiver?  No    Lifestyle & Psychosocial Needs:  Social Determinants of Health     Food Insecurity: Not on file   Depression: Not at risk (12/18/2023)    Received from [employer] [facility] [employer] [facility], [employer]  [facility]    PHQ-2     PHQ-2 TOTAL SCORE: 0   Housing Stability: Not on file   Tobacco Use: High Risk (4/24/2024)    Patient History     Smoking Tobacco Use: Some Days     Smokeless Tobacco Use: Never     Passive Exposure: Not on file   Financial Resource Strain: Not on file   Alcohol Use: Not on file   Transportation Needs: Not on file   Physical Activity: Not on file   Interpersonal Safety: Not on file   Stress: Not on file   Social Connections: Unknown (12/11/2023)    Received from [facility], [facility] & [facility]    Social Connections     Frequency of Communication with Friends and Family: Not on file   Health Literacy: Not on file       Functional Status:  Prior to admission patient needed assistance:   Dependent ADLs:: Independent  Dependent IADLs:: Independent       Mental Health Status:  Mental Health Status: No Current Concerns       Chemical Dependency Status:  Chemical Dependency Status: No Current Concerns             Values/Beliefs:  Spiritual, Cultural Beliefs, Restoration Practices, Values that affect care: no               Additional Information:  Initial consult done with patient in room and spouse on phone. Prior to admit was independent. Reviewed recommendations for Home RN, PT, OT. Patient and spouse in agreement with home care recommendations. Medicare list of agencies left in room for their review.     Margaret Meier RN   [facility]   Phone [phone], AIT or [phone]

## 2024-05-03 NOTE — PROGRESS NOTES
CLINICAL NUTRITION SERVICES - REASSESSMENT NOTE      Recommendations Ordered by Registered Dietitian (RD):   RD discussed nutrition POC-- as pt able to eat more as diet advances, can wean off TPN. Pt understanding. Offered oral nutrition supplements to help facilitate improving PO intake and wean off TPN. Pt intitally hesitant, but after encouragement from RN as well. He is open to a vanilla Ensure Enlive later tonight. RD will schedule.   Ordered vanilla Ensure Enlive w/ dinner     Placed change request to discontinue lipids and decrease TPN to 75 mL/hr (to provide 1278 kcal, 90 g protein, 270 g CHO)     Malnutrition:   % Weight Loss:  Weight loss does not meet criteria for malnutrition-- 6% loss w/in 2 months (4/28)  % Intake:  </= 50% for >/= 5 days (severe malnutrition) (4/28)-- improving w/ TPN  Subcutaneous Fat Loss:  None observed (4/28)  Muscle Loss:  Temporal region - mild depletion (4/28)  Fluid Retention:  Does not meet criteria-- trace BLE edema     Malnutrition Diagnosis: Patient does not meet two of the established criteria necessary for diagnosing malnutrition         EVALUATION OF PROGRESS TOWARD GOALS   Diet: Full Liquid Diet      4/24: NPO  5/2: CLD  5/3: FLD    Intake/Tolerance:  Visited w/ pt this AM. He just got back from a walk w/ RN. He ordered breakfast- yogurt and ice cream. Really craving a glass of water though! Reported he does have an appetite. RD discussed nutrition POC-- as pt able to eat more as diet advances, can wean off TPN. Pt understanding. Offered oral nutrition supplements to help facilitate improving PO intake and wean off TPN. Pt intitally hesitant, but after encouragement from RN as well. He is open to a vanilla Ensure Enlive later tonight. RD will schedule.   Touched base w/ pt's wife over the phone (Rosalee) per request. She was happy to hear he is on a full liquid diet.   Pt received first meal (fruit ice, jello, juice) yesterday per health touch. No intakes documented this  admission per nursing flow sheet.      Nutrition Support: Parenteral  Type of Access: PICC  Parenteral Frequency: Continuous   Parenteral Regimen: 100 mL/hr, 110 g AA, 380 g dextrose, + 250 mL 20% lipids daily   Total Parenteral Provisions: 2232 kcal (25 kcal/kg), 1.25 g/kg AA, GIR of 3, 22% kcal from fat    4/28: TPN orders placed, PICC placement deferred --> TPN not started   4/29: TPN started at 50 mL/hr  4/30: advanced to 75 mL/hr  5/1: reached goal rate (as above)  D/w pharmacist-- decrease rate to 75 mL/hr, discontinue lipids   -->   Clinimix E (5/15) @ 75 mL/hr (1800 mL)  = 1278 kcal, 90 g protein, 270 g CHO      ASSESSED NUTRITION NEEDS:  Dosing Weight: 88 kg (adjusted, based on 110.2 kg-- 4/24)  Estimated Energy Needs: 2702-9765 kcal (25-30 Kcal/Kg)  Justification: maintenance  Estimated Protein Needs: 105-130 grams protein (1.2-1.5 g pro/Kg)  Justification: post-op  Estimated Fluid Needs: 1 mL/Kcal  Justification: maintenance        NEW FINDINGS:   Weight: generally stable wt this admit, suspect 5/2 wt is outlier   Date/Time Weight Weight Method   05/03/24 0600 111.9 kg (246 lb 12.8 oz) Standing scale   05/02/24 0620 103.6 kg (228 lb 8 oz) Standing scale   05/01/24 0500 114.4 kg (252 lb 3.3 oz) --   04/30/24 0230 116.9 kg (257 lb 11.2 oz) Standing scale   04/24/24 1736 110.2 kg (243 lb)      I/O: Net I/O Since Admission: 656 mL [05/03/24 0907].  Ostomy output = 400 mL on 5/1, 150 mL on 4/29  NGT output = 1445 mL yesterday, 1500 mL on 5/1, 1250 mL on 4/30, 700 mL on 4/29, 750 mL on 4/28 5/2: NGT removed   Drain output = 15 mL on 5/2, 15 mL on 5/1, 25 mL on 4/30, 10 mL on 4/28    Labs: reviewed   Component Value Date   CR 0.59 (L) 05/03/2024     Lab 05/03/24  0816 05/03/24  0606 05/03/24  0421 05/02/24  2348 05/02/24 2110 05/02/24  1634   * 152* 142* 152* 139* 142*      Medications: reviewed  Medication Dose Route Frequency Provider Last Admin    famotidine (PEPCID) injection 20 mg  20 mg  Intravenous BID Alejandra Cruz MD 20 mg at 05/02/24 2134    insulin aspart (NovoLOG) injection (RAPID ACTING)  1-7 Units Subcutaneous Q4H Sameera Grey [facility] 1 Units at 05/03/24 0422           Previous Goals:   TPN to meet needs while pt is NPO   Evaluation: Met    Previous Nutrition Diagnosis:   Inadequate protein-energy intake related to NPO status as evidenced by pt not meeting nutrition needs   Evaluation: Improving        MALNUTRITION  % Weight Loss:  Weight loss does not meet criteria for malnutrition-- 6% loss w/in 2 months (4/28)  % Intake:  </= 50% for >/= 5 days (severe malnutrition) (4/28)-- improving w/ TPN  Subcutaneous Fat Loss:  None observed (4/28)  Muscle Loss:  Temporal region - mild depletion (4/28)  Fluid Retention:  Does not meet criteria-- trace BLE edema     Malnutrition Diagnosis: Patient does not meet two of the established criteria necessary for diagnosing malnutrition          CURRENT NUTRITION DIAGNOSIS  Inadequate oral intake related to slow diet advancement 2/2 ROBF as evidenced by first day of full liquids today, need for oral nutrition supplements, continued TPN         INTERVENTIONS  Recommendations / Nutrition Prescription  RD discussed nutrition POC-- as pt able to eat more as diet advances, can wean off TPN. Pt understanding. Offered oral nutrition supplements to help facilitate improving PO intake and wean off TPN. Pt intitally hesitant, but after encouragement from RN as well. He is open to a vanilla Ensure Enlive later tonight. RD will schedule.   Ordered vanilla Ensure Enlive w/ dinner   Placed change request to discontinue lipids and decrease TPN to 75 mL/hr (to provide 1278 kcal, 90 g protein, 270 g CHO)      Implementation  Collaboration with other providers  Medical food supplement therapy  Parenteral Nutrition/IV Fluids     Goals  Wean off TPN w/in 72 hours  Diet advancement >FLD w/in 48 hours   Pt to consume >50% of 3 meals/day when diet allows      MONITORING  AND EVALUATION:  Progress towards goals will be monitored and evaluated per protocol and Practice Guidelines      Leticia Vora RD, LD  Pager: [phone]

## 2024-05-04 ENCOUNTER — APPOINTMENT (OUTPATIENT)
Dept: OCCUPATIONAL THERAPY | Facility: CLINIC | Age: 69
DRG: 329 | End: 2024-05-04
Payer: COMMERCIAL

## 2024-05-04 LAB
ALBUMIN SERPL BCG-MCNC: 2.8 G/DL (ref 3.5–5.2)
ALP SERPL-CCNC: 191 U/L (ref 40–150)
ALT SERPL W P-5'-P-CCNC: 20 U/L (ref 0–70)
ANION GAP SERPL CALCULATED.3IONS-SCNC: 10 MMOL/L (ref 7–15)
AST SERPL W P-5'-P-CCNC: 28 U/L (ref 0–45)
BILIRUB DIRECT SERPL-MCNC: 0.43 MG/DL (ref 0–0.3)
BILIRUB SERPL-MCNC: 1 MG/DL
BUN SERPL-MCNC: 14.8 MG/DL (ref 8–23)
CALCIUM SERPL-MCNC: 8.2 MG/DL (ref 8.8–10.2)
CHLORIDE SERPL-SCNC: 100 MMOL/L (ref 98–107)
CREAT SERPL-MCNC: 0.61 MG/DL (ref 0.67–1.17)
DEPRECATED HCO3 PLAS-SCNC: 24 MMOL/L (ref 22–29)
EGFRCR SERPLBLD CKD-EPI 2021: >90 ML/MIN/1.73M2
GLUCOSE BLDC GLUCOMTR-MCNC: 129 MG/DL (ref 70–99)
GLUCOSE BLDC GLUCOMTR-MCNC: 130 MG/DL (ref 70–99)
GLUCOSE BLDC GLUCOMTR-MCNC: 139 MG/DL (ref 70–99)
GLUCOSE BLDC GLUCOMTR-MCNC: 147 MG/DL (ref 70–99)
GLUCOSE BLDC GLUCOMTR-MCNC: 148 MG/DL (ref 70–99)
GLUCOSE BLDC GLUCOMTR-MCNC: 158 MG/DL (ref 70–99)
GLUCOSE SERPL-MCNC: 155 MG/DL (ref 70–99)
INR PPP: 1.19 (ref 0.85–1.15)
MAGNESIUM SERPL-MCNC: 2.2 MG/DL (ref 1.7–2.3)
PHOSPHATE SERPL-MCNC: 3.8 MG/DL (ref 2.5–4.5)
POTASSIUM SERPL-SCNC: 4.4 MMOL/L (ref 3.4–5.3)
PREALB SERPL-MCNC: 8.8 MG/DL (ref 20–40)
PROT SERPL-MCNC: 6.4 G/DL (ref 6.4–8.3)
SODIUM SERPL-SCNC: 134 MMOL/L (ref 135–145)

## 2024-05-04 PROCEDURE — 250N000011 HC RX IP 250 OP 636: Performed by: COLON & RECTAL SURGERY

## 2024-05-04 PROCEDURE — 84134 ASSAY OF PREALBUMIN: CPT | Performed by: COLON & RECTAL SURGERY

## 2024-05-04 PROCEDURE — 97530 THERAPEUTIC ACTIVITIES: CPT | Mod: GO

## 2024-05-04 PROCEDURE — 80053 COMPREHEN METABOLIC PANEL: CPT | Performed by: COLON & RECTAL SURGERY

## 2024-05-04 PROCEDURE — 97110 THERAPEUTIC EXERCISES: CPT | Mod: GO

## 2024-05-04 PROCEDURE — 85610 PROTHROMBIN TIME: CPT | Performed by: COLON & RECTAL SURGERY

## 2024-05-04 PROCEDURE — 250N000013 HC RX MED GY IP 250 OP 250 PS 637

## 2024-05-04 PROCEDURE — 250N000009 HC RX 250: Performed by: COLON & RECTAL SURGERY

## 2024-05-04 PROCEDURE — 250N000011 HC RX IP 250 OP 636: Performed by: PHYSICIAN ASSISTANT

## 2024-05-04 PROCEDURE — 250N000013 HC RX MED GY IP 250 OP 250 PS 637: Performed by: COLON & RECTAL SURGERY

## 2024-05-04 PROCEDURE — 83735 ASSAY OF MAGNESIUM: CPT | Performed by: COLON & RECTAL SURGERY

## 2024-05-04 PROCEDURE — 120N000001 HC R&B MED SURG/OB

## 2024-05-04 PROCEDURE — 82248 BILIRUBIN DIRECT: CPT | Performed by: COLON & RECTAL SURGERY

## 2024-05-04 PROCEDURE — 84100 ASSAY OF PHOSPHORUS: CPT | Performed by: COLON & RECTAL SURGERY

## 2024-05-04 RX ORDER — KETOROLAC TROMETHAMINE 15 MG/ML
15 INJECTION, SOLUTION INTRAMUSCULAR; INTRAVENOUS EVERY 6 HOURS PRN
Status: DISCONTINUED | OUTPATIENT
Start: 2024-05-04 | End: 2024-05-06

## 2024-05-04 RX ADMIN — INSULIN ASPART 1 UNITS: 100 INJECTION, SOLUTION INTRAVENOUS; SUBCUTANEOUS at 05:16

## 2024-05-04 RX ADMIN — OXYCODONE HYDROCHLORIDE 5 MG: 5 TABLET ORAL at 15:59

## 2024-05-04 RX ADMIN — FAMOTIDINE 20 MG: 10 INJECTION, SOLUTION INTRAVENOUS at 09:36

## 2024-05-04 RX ADMIN — INSULIN ASPART 1 UNITS: 100 INJECTION, SOLUTION INTRAVENOUS; SUBCUTANEOUS at 21:46

## 2024-05-04 RX ADMIN — HEPARIN SODIUM 5000 UNITS: 5000 INJECTION, SOLUTION INTRAVENOUS; SUBCUTANEOUS at 14:37

## 2024-05-04 RX ADMIN — INSULIN ASPART 1 UNITS: 100 INJECTION, SOLUTION INTRAVENOUS; SUBCUTANEOUS at 09:56

## 2024-05-04 RX ADMIN — FAMOTIDINE 20 MG: 10 INJECTION, SOLUTION INTRAVENOUS at 21:13

## 2024-05-04 RX ADMIN — OXYCODONE HYDROCHLORIDE 5 MG: 5 TABLET ORAL at 10:08

## 2024-05-04 RX ADMIN — CYCLOBENZAPRINE 10 MG: 10 TABLET, FILM COATED ORAL at 01:52

## 2024-05-04 RX ADMIN — LIDOCAINE 2 PATCH: 4 PATCH TOPICAL at 09:37

## 2024-05-04 RX ADMIN — ACETAMINOPHEN 650 MG: 325 TABLET, FILM COATED ORAL at 10:08

## 2024-05-04 RX ADMIN — MELATONIN 5 MG TABLET 5 MG: at 21:13

## 2024-05-04 RX ADMIN — HEPARIN SODIUM 5000 UNITS: 5000 INJECTION, SOLUTION INTRAVENOUS; SUBCUTANEOUS at 04:15

## 2024-05-04 RX ADMIN — ACETAMINOPHEN 650 MG: 325 TABLET, FILM COATED ORAL at 15:34

## 2024-05-04 RX ADMIN — ASCORBIC ACID, VITAMIN A PALMITATE, CHOLECALCIFEROL, THIAMINE HYDROCHLORIDE, RIBOFLAVIN-5 PHOSPHATE SODIUM, PYRIDOXINE HYDROCHLORIDE, NIACINAMIDE, DEXPANTHENOL, ALPHA-TOCOPHEROL ACETATE, VITAMIN K1, FOLIC ACID, BIOTIN, CYANOCOBALAMIN: 200; 3300; 200; 6; 3.6; 6; 40; 15; 10; 150; 600; 60; 5 INJECTION, SOLUTION INTRAVENOUS at 21:14

## 2024-05-04 RX ADMIN — SIMETHICONE 80 MG: 80 TABLET, CHEWABLE ORAL at 10:13

## 2024-05-04 RX ADMIN — OXYCODONE HYDROCHLORIDE 5 MG: 5 TABLET ORAL at 04:15

## 2024-05-04 RX ADMIN — HEPARIN SODIUM 5000 UNITS: 5000 INJECTION, SOLUTION INTRAVENOUS; SUBCUTANEOUS at 21:13

## 2024-05-04 RX ADMIN — OXYCODONE HYDROCHLORIDE 5 MG: 5 TABLET ORAL at 21:13

## 2024-05-04 ASSESSMENT — ACTIVITIES OF DAILY LIVING (ADL)
ADLS_ACUITY_SCORE: 27
ADLS_ACUITY_SCORE: 26
ADLS_ACUITY_SCORE: 27
ADLS_ACUITY_SCORE: 26
ADLS_ACUITY_SCORE: 27
ADLS_ACUITY_SCORE: 27
ADLS_ACUITY_SCORE: 26
ADLS_ACUITY_SCORE: 27
ADLS_ACUITY_SCORE: 26
ADLS_ACUITY_SCORE: 25
ADLS_ACUITY_SCORE: 27
ADLS_ACUITY_SCORE: 26
ADLS_ACUITY_SCORE: 27
ADLS_ACUITY_SCORE: 27
ADLS_ACUITY_SCORE: 26
ADLS_ACUITY_SCORE: 27
ADLS_ACUITY_SCORE: 27
ADLS_ACUITY_SCORE: 26
ADLS_ACUITY_SCORE: 27
ADLS_ACUITY_SCORE: 25

## 2024-05-04 NOTE — PROGRESS NOTES
COLON & RECTAL SURGERY  PROGRESS NOTE    May 3, 2024  Post-op Day # 10    SUBJECTIVE: Has a lot of pain over his body, but appears a mostly musculoskeletal in origin.  Has been tolerating full liquid diet.  Passing gas via stoma and had more liquid stool output yesterday.  Wife is very concerned because she feels that his mental status is not  at his baseline.    OBJECTIVE:  Temp:  [97.3  F (36.3  C)-98.5  F (36.9  C)] 98.5  F (36.9  C)  Pulse:  [66-80] 68  Resp:  [16] 16  BP: (122-132)/(65-69) 132/69  SpO2:  [95 %-98 %] 96 %    Intake/Output Summary (Last 24 hours) at 5/3/2024 0730  Last data filed at 5/3/2024 0601  Gross per 24 hour   Intake 2438 ml   Output 530 ml   Net 1908 ml       GENERAL:  Awake, alert, no acute distress,   HEAD: Nomocephalic atraumatic  SCLERA: anicteric  EXTREMITIES: warm and well perfused  ABDOMEN:  Soft, appropriately tender, round, no rebound or guarding, no peritoneal signs. Ostomy pink with small amount of dark brown loose stool in appliance.  INCISION:  C/d/i, inferior opening moist with minimal exudate, no surrounding erythema.   LABS:  Lab Results   Component Value Date    WBC 15.7 05/02/2024    WBC 6.6 10/17/2019     Lab Results   Component Value Date    HGB 9.7 05/02/2024    HGB 17.2 10/17/2019     Lab Results   Component Value Date    HCT 29.1 05/02/2024    HCT 49.0 10/17/2019     Lab Results   Component Value Date     05/03/2024     10/17/2019     Last Basic Metabolic Panel:  Lab Results   Component Value Date     05/03/2024     10/17/2019      Lab Results   Component Value Date    POTASSIUM 4.0 05/03/2024    POTASSIUM 3.8 10/17/2019     Lab Results   Component Value Date    CHLORIDE 100 05/03/2024    CHLORIDE 106 10/17/2019     Lab Results   Component Value Date    LENORE 8.2 05/03/2024    LENORE 9.3 10/17/2019     Lab Results   Component Value Date    CO2 23 05/03/2024    CO2 26 10/17/2019     Lab Results   Component Value Date    BUN 14.1 05/03/2024    BUN  17 10/17/2019     Lab Results   Component Value Date    CR 0.59 05/03/2024    CR 0.84 10/17/2019     Lab Results   Component Value Date     05/03/2024     05/03/2024     10/17/2019       ASSESSMENT/PLAN: Castillo Alba is a 68 year old man POD#10 s/p exploratory laparotomy with takedown of colorectal anastomosis and creation of an end colostomy for management of an anastomotic leak due to a pelvic hematoma.  Recovering slowly, on TPN and slowly increasing oral intake.     I discussed with the nurse that regarding patient's wife's concerns with his mental status, it is normal that he may not be quite at his baseline, although on my interview with him, it appears that he is answering questions appropriately.  The nurse conveyed that his wife's concerns are related to the fact that after his last operation, he was completely intact and recovered well.  However, thus that was an elective operation in this one was done on an urgent basis, it is expected that his recovery will be slower.     - Advanced to regular diet  - Continue TPN until adequate oral intake. Likely will be able to wean TPN tomorrow if continuing to take in good PO intake.  - Start toradol for MSK pain. Flexeril discontinued due to concerns about patient's mental status. Discussed to minimize opioids.  - BID wound cares to inferior aspect of the midline wound   - Multimodal pain control  - Ambulate. Appreciate PT/OT  - Continue MARIA ISABEL drain  - SQH for DVT ppx   - WOCN teaching. Will need HH arranged     Discussed with Dr. Stef Molina MD Fellow  Colon and Rectal Surgery

## 2024-05-04 NOTE — PLAN OF CARE
Goal Outcome Evaluation: Progressing    5/3/24, 7 p to 11 p, VSS on RA, lethargic, multimodal management for pain, TPN infusing , full liquid diet, takes pills whole, SBA with GB and W in transfers, impulsive at times,  sitter at bedside, LLQ ostomy with liquid dark green output, packing on lower midline incision changed, continue to monitor.

## 2024-05-04 NOTE — PLAN OF CARE
Goal Outcome Evaluation:         Shift: 5/4/24 - 11p-7a  Surgery/POD#: POD 10 from an ex lap, resection of colorectal anastamosis, abd washout, end colostomy.  Behavior & Aggression: Green  Fall Risk: Yes  Orientation: Answers orientation questions appropriately, can become forgetful or confused, MD changed cyclobenzaprine dose to PRN to see if this helps clear mentation.  ABNL VS/O2: VSS on RA  Tele: NA  ABNL Labs: K, Mg & Ph replacement protocols, rechecks tomorrow, WBC 14.3, Pl 767, Hgb 9.3  Pain Management: flexeril x 1, oxy x1,  Bowel/Bladder: voiding in BR/bedside urinal, bowel sounds active, +f/+s in ostomy  Drains: RLQ MARIA ISABEL to bulb suction, colostomy  Lines: PICC infusing TPN  Skin: x3 IR sites w/ band-aids, old incisions from previous surgery WDL, mid abd incision w/ staples, upper/lower portion opened - dressing change BID per ordersDiet: Full liquids, denies N/V  Activity Level: A x1 gb, walker  Tests/Procedures: NA  Anticipated  DC Date: pending, [facility]  Significant Information:   CRS, WOC, SW following.

## 2024-05-04 NOTE — PLAN OF CARE
Goal Outcome Evaluation:  Shift: 5/3 - 7a-7p  Surgery/POD#: POD 9 from an ex lap, resection of colorectal anastamosis, abd washout, end colostomy.  Behavior & Aggression: Green  Fall Risk: Yes  Orientation: Answers orientation questions appropriately, can become forgetful or confused, MD changed cyclobenzaprine dose to PRN to see if this helps clear mentation.  ABNL VS/O2: VSS  Tele: NA  ABNL Labs: K, Mg & Ph replacement protocols, rechecks tomorrow, WBC 14.3, Pl 767, Hgb 9.3  Pain Management: scheduled flexeril, Tylenol given x1, MD paged for additional PO pain medications - pt would like 5mg oxycodone available PRN.   Bowel/Bladder: voiding in BR/bedside urinal, bowel sounds hypoactive, +f/+s in ostomy  Drains: RLQ MARIA ISABEL to bulb suction, colostomy  Lines: PICC infusing TPN  Skin: x3 IR sites w/ band-aids, old incisions from previous surgery WDL, mid abd incision w/ staples, upper/lower portion opened - dressing change BID per orders, done this AM per provider  Diet: Full liquids, denies N/V  Activity Level: x1 gb, walker  Tests/Procedures: NA  Anticipated  DC Date: pending, [facility]  Significant Information:   CRS, WOC, SW following.

## 2024-05-05 ENCOUNTER — APPOINTMENT (OUTPATIENT)
Dept: OCCUPATIONAL THERAPY | Facility: CLINIC | Age: 69
DRG: 329 | End: 2024-05-05
Payer: COMMERCIAL

## 2024-05-05 ENCOUNTER — APPOINTMENT (OUTPATIENT)
Dept: PHYSICAL THERAPY | Facility: CLINIC | Age: 69
DRG: 329 | End: 2024-05-05
Payer: COMMERCIAL

## 2024-05-05 LAB
ANION GAP SERPL CALCULATED.3IONS-SCNC: 12 MMOL/L (ref 7–15)
BUN SERPL-MCNC: 15.8 MG/DL (ref 8–23)
CALCIUM SERPL-MCNC: 8.6 MG/DL (ref 8.8–10.2)
CHLORIDE SERPL-SCNC: 97 MMOL/L (ref 98–107)
CREAT SERPL-MCNC: 0.61 MG/DL (ref 0.67–1.17)
DEPRECATED HCO3 PLAS-SCNC: 22 MMOL/L (ref 22–29)
EGFRCR SERPLBLD CKD-EPI 2021: >90 ML/MIN/1.73M2
ERYTHROCYTE [DISTWIDTH] IN BLOOD BY AUTOMATED COUNT: 17.6 % (ref 10–15)
GLUCOSE BLDC GLUCOMTR-MCNC: 109 MG/DL (ref 70–99)
GLUCOSE BLDC GLUCOMTR-MCNC: 125 MG/DL (ref 70–99)
GLUCOSE BLDC GLUCOMTR-MCNC: 125 MG/DL (ref 70–99)
GLUCOSE BLDC GLUCOMTR-MCNC: 145 MG/DL (ref 70–99)
GLUCOSE BLDC GLUCOMTR-MCNC: 145 MG/DL (ref 70–99)
GLUCOSE BLDC GLUCOMTR-MCNC: 88 MG/DL (ref 70–99)
GLUCOSE SERPL-MCNC: 303 MG/DL (ref 70–99)
HCT VFR BLD AUTO: 27.7 % (ref 40–53)
HGB BLD-MCNC: 9 G/DL (ref 13.3–17.7)
MAGNESIUM SERPL-MCNC: 2.3 MG/DL (ref 1.7–2.3)
MCH RBC QN AUTO: 28.7 PG (ref 26.5–33)
MCHC RBC AUTO-ENTMCNC: 32.5 G/DL (ref 31.5–36.5)
MCV RBC AUTO: 88 FL (ref 78–100)
PHOSPHATE SERPL-MCNC: 4.4 MG/DL (ref 2.5–4.5)
PLATELET # BLD AUTO: 734 10E3/UL (ref 150–450)
POTASSIUM SERPL-SCNC: 4.9 MMOL/L (ref 3.4–5.3)
RBC # BLD AUTO: 3.14 10E6/UL (ref 4.4–5.9)
SODIUM SERPL-SCNC: 131 MMOL/L (ref 135–145)
WBC # BLD AUTO: 13.3 10E3/UL (ref 4–11)

## 2024-05-05 PROCEDURE — 250N000011 HC RX IP 250 OP 636: Performed by: PHYSICIAN ASSISTANT

## 2024-05-05 PROCEDURE — 250N000013 HC RX MED GY IP 250 OP 250 PS 637

## 2024-05-05 PROCEDURE — 97110 THERAPEUTIC EXERCISES: CPT | Mod: GO

## 2024-05-05 PROCEDURE — 250N000013 HC RX MED GY IP 250 OP 250 PS 637: Performed by: COLON & RECTAL SURGERY

## 2024-05-05 PROCEDURE — 97530 THERAPEUTIC ACTIVITIES: CPT | Mod: GO

## 2024-05-05 PROCEDURE — 80048 BASIC METABOLIC PNL TOTAL CA: CPT | Performed by: COLON & RECTAL SURGERY

## 2024-05-05 PROCEDURE — 120N000001 HC R&B MED SURG/OB

## 2024-05-05 PROCEDURE — 97530 THERAPEUTIC ACTIVITIES: CPT | Mod: GP

## 2024-05-05 PROCEDURE — 84100 ASSAY OF PHOSPHORUS: CPT | Performed by: COLON & RECTAL SURGERY

## 2024-05-05 PROCEDURE — 97116 GAIT TRAINING THERAPY: CPT | Mod: GP

## 2024-05-05 PROCEDURE — 250N000011 HC RX IP 250 OP 636: Performed by: COLON & RECTAL SURGERY

## 2024-05-05 PROCEDURE — 85027 COMPLETE CBC AUTOMATED: CPT | Performed by: COLON & RECTAL SURGERY

## 2024-05-05 PROCEDURE — 83735 ASSAY OF MAGNESIUM: CPT | Performed by: COLON & RECTAL SURGERY

## 2024-05-05 RX ORDER — FAMOTIDINE 20 MG/1
20 TABLET, FILM COATED ORAL 2 TIMES DAILY
Status: DISCONTINUED | OUTPATIENT
Start: 2024-05-05 | End: 2024-05-07 | Stop reason: HOSPADM

## 2024-05-05 RX ADMIN — KETOROLAC TROMETHAMINE 15 MG: 15 INJECTION, SOLUTION INTRAMUSCULAR; INTRAVENOUS at 18:42

## 2024-05-05 RX ADMIN — HEPARIN SODIUM 5000 UNITS: 5000 INJECTION, SOLUTION INTRAVENOUS; SUBCUTANEOUS at 21:10

## 2024-05-05 RX ADMIN — MELATONIN 5 MG TABLET 5 MG: at 21:10

## 2024-05-05 RX ADMIN — HEPARIN SODIUM 5000 UNITS: 5000 INJECTION, SOLUTION INTRAVENOUS; SUBCUTANEOUS at 06:34

## 2024-05-05 RX ADMIN — LIDOCAINE 2 PATCH: 4 PATCH TOPICAL at 09:10

## 2024-05-05 RX ADMIN — KETOROLAC TROMETHAMINE 15 MG: 15 INJECTION, SOLUTION INTRAMUSCULAR; INTRAVENOUS at 09:30

## 2024-05-05 RX ADMIN — INSULIN ASPART 1 UNITS: 100 INJECTION, SOLUTION INTRAVENOUS; SUBCUTANEOUS at 09:30

## 2024-05-05 RX ADMIN — FAMOTIDINE 20 MG: 10 INJECTION, SOLUTION INTRAVENOUS at 09:10

## 2024-05-05 RX ADMIN — HEPARIN SODIUM 5000 UNITS: 5000 INJECTION, SOLUTION INTRAVENOUS; SUBCUTANEOUS at 14:00

## 2024-05-05 RX ADMIN — FAMOTIDINE 20 MG: 20 TABLET, FILM COATED ORAL at 21:10

## 2024-05-05 RX ADMIN — OXYCODONE HYDROCHLORIDE 5 MG: 5 TABLET ORAL at 01:58

## 2024-05-05 ASSESSMENT — ACTIVITIES OF DAILY LIVING (ADL)
ADLS_ACUITY_SCORE: 26
ADLS_ACUITY_SCORE: 30
ADLS_ACUITY_SCORE: 26
ADLS_ACUITY_SCORE: 26
ADLS_ACUITY_SCORE: 30
ADLS_ACUITY_SCORE: 26
ADLS_ACUITY_SCORE: 30
ADLS_ACUITY_SCORE: 26

## 2024-05-05 NOTE — PLAN OF CARE
Goal Outcome Evaluation:    Shift: 5/4 - 7a-7p  Surgery/POD#: POD 10 from an ex lap, resection of colorectal anastamosis, abd washout with end colostomy.  Behavior & Aggression: Green  Fall Risk: Yes  Orientation: Answers orientation questions appropriately, is often forgetful or confused, MD discontinued cyclobenzaprine dose to see if this helps clear mentation.  Also, using IV Toradol for pain vs oxycodone PRN to avoid medications that could be contributing to mentation changes.  Pain is well controlled with the Toradol.    ABNL VS/O2: VSS  Tele: NA  ABNL Labs: K, Mg & Ph replacement protocols, rechecks tomorrow  Pain Management: IV Toradol given x2 with good control.   Bowel/Bladder: voiding in BR/bedside urinal, bowel sounds active, +f/+s in ostomy  Drains: RLQ MARIA ISABEL to bulb suction CDI, colostomy, Midline dressing changed today.    Lines: PICC infusing TPN  Skin: x3 IR sites w/ band-aids, old incisions from previous surgery WDL, mid abd incision w/ staples which is leaking, MD aware and assessed today, upper/lower portion opened - dressing changed this AM   Diet: Advanced to regular today, on calorie counts, denies N/V  Activity Level: x1 gb, walker  Tests/Procedures: NA  Anticipated  DC Date: pending, [facility]  Significant Information:   CRS, WOC, SW following.

## 2024-05-05 NOTE — PLAN OF CARE
Goal Outcome Evaluation:    Date & Time: 5/4/24  8103-0103   Surgery/POD#: POD 11 exploratory laparotomy, resection of colorectal anastamois, abdominal washout & end colostomy   Behavior & Aggression: Green   Fall Risk: Yes   Orientation:A&ox4 intermittent confusion   ABNL VS/O2: VSS   ABNL Labs:  , 145, 125   Pain Management:  Scheduled tylenol and PRN oxy x1   Bowel/Bladder: Continent of bowel/bladder   Drains: PICC line infusing TPN, MARIA ISABEL drain, Colostomy   Wounds/incisions: 3 lap sites, midline incision, previous incisions from prior to admission sigmoid colectomy   Diet: Regular diet   Activity Level: Ax1 GB &W  Tests/Procedures: None   Anticipated  DC Date: Pending   Significant Information: Sitter at bedside.  Answers questions appropriately, intermittently confused, easily redirectable. No acute events overnight.

## 2024-05-05 NOTE — PLAN OF CARE
Goal Outcome Evaluation:    Shift: 5/4 - 7a-7p  Surgery/POD#: POD 10 from an ex lap, resection of colorectal anastamosis, abd washout, end colostomy.  Behavior & Aggression: Green  Fall Risk: Yes  Orientation: Answers orientation questions appropriately, can become forgetful or confused, MD discontinued cyclobenzaprine dose to see if this helps clear mentation.  Sitter for intermittent confusion, frequent urination.  ABNL VS/O2: VSS  Tele: NA  ABNL Labs: K, Mg & Ph replacement protocols, rechecks tomorrow  Pain Management: Tylenol given x1,  5mg oxycodone given x2.   Bowel/Bladder: voiding in BR/bedside urinal, bowel sounds hypoactive, +f/+s in ostomy  Drains: RLQ MARIA ISABEL to bulb suction, dressing changed today, colostomy  Lines: PICC infusing TPN  Skin: x3 IR sites w/ band-aids, old incisions from previous surgery WDL, mid abd incision w/ staples which is leaking, MD aware and assessed today, upper/lower portion opened - dressing changed this AM   Diet: Advanced to regular today, on calorie counts, denies N/V  Activity Level: x1 gb, walker  Tests/Procedures: NA  Anticipated  DC Date: pending, [facility]  Significant Information:   CRS, WOC, SW following.

## 2024-05-05 NOTE — PROGRESS NOTES
COLON & RECTAL SURGERY  PROGRESS NOTE    May 5, 2024  Post-op Day # 11    SUBJECTIVE: Back pain improved, abdominal pain controlled. Tolerating regular diet, started on calorie counts, ate all of breakfast this morning. Passing gas and small amount of stool per ostomy. Has been intermittently confused, sitter at bedside, but appropriately answers my questions this morning.     OBJECTIVE:  Temp:  [98.4  F (36.9  C)] 98.4  F (36.9  C)  Pulse:  [75] 75  Resp:  [16] 16  BP: (133)/(68) 133/68  SpO2:  [97 %] 97 %    Intake/Output Summary (Last 24 hours) at 5/3/2024 0730  Last data filed at 5/3/2024 0601  Gross per 24 hour   Intake 2438 ml   Output 530 ml   Net 1908 ml       GENERAL:  Awake, alert, no acute distress,   HEAD: Nomocephalic atraumatic  SCLERA: anicteric  EXTREMITIES: warm and well perfused  ABDOMEN:  Soft, appropriately tender, round, no rebound or guarding, no peritoneal signs. Ostomy pink with small amount of dark brown loose stool in appliance.  INCISION:  C/d/i, inferior opening moist with small amount of brownish discharge, no surrounding erythema.   LABS:  Lab Results   Component Value Date    WBC 15.7 05/02/2024    WBC 6.6 10/17/2019     Lab Results   Component Value Date    HGB 9.7 05/02/2024    HGB 17.2 10/17/2019     Lab Results   Component Value Date    HCT 29.1 05/02/2024    HCT 49.0 10/17/2019     Lab Results   Component Value Date     05/03/2024     10/17/2019     Last Basic Metabolic Panel:  Lab Results   Component Value Date     05/03/2024     10/17/2019      Lab Results   Component Value Date    POTASSIUM 4.0 05/03/2024    POTASSIUM 3.8 10/17/2019     Lab Results   Component Value Date    CHLORIDE 100 05/03/2024    CHLORIDE 106 10/17/2019     Lab Results   Component Value Date    LENORE 8.2 05/03/2024    LENORE 9.3 10/17/2019     Lab Results   Component Value Date    CO2 23 05/03/2024    CO2 26 10/17/2019     Lab Results   Component Value Date    BUN 14.1 05/03/2024    BUN  17 10/17/2019     Lab Results   Component Value Date    CR 0.59 05/03/2024    CR 0.84 10/17/2019     Lab Results   Component Value Date     05/03/2024     05/03/2024     10/17/2019       ASSESSMENT/PLAN: Castillo Alba is a 68 year old man POD#10 s/p exploratory laparotomy with takedown of colorectal anastomosis and creation of an end colostomy for management of an anastomotic leak due to a pelvic hematoma.  Recovering slowly, on TPN and slowly increasing oral intake.     Intermittently confused, suspect due to delirium. Patient was napping as I came in the room, and I discussed with him to avoid daytime napping. He was fully oriented in my conversation with him today.      - Regular diet  - Will wean off TPN today.  - BID wound cares to inferior aspect of the midline wound   - Multimodal pain control  - Ambulate. Appreciate PT/OT  - Continue MARIA ISABEL drain  - SQH for DVT ppx   - WOCN teaching. Will need HH arranged     Discussed with Dr. Stef Molina MD Fellow  Colon and Rectal Surgery

## 2024-05-06 ENCOUNTER — APPOINTMENT (OUTPATIENT)
Dept: OCCUPATIONAL THERAPY | Facility: CLINIC | Age: 69
DRG: 329 | End: 2024-05-06
Payer: COMMERCIAL

## 2024-05-06 ENCOUNTER — APPOINTMENT (OUTPATIENT)
Dept: PHYSICAL THERAPY | Facility: CLINIC | Age: 69
DRG: 329 | End: 2024-05-06
Payer: COMMERCIAL

## 2024-05-06 LAB
ALBUMIN SERPL BCG-MCNC: 2.6 G/DL (ref 3.5–5.2)
ALP SERPL-CCNC: 201 U/L (ref 40–150)
ALT SERPL W P-5'-P-CCNC: 17 U/L (ref 0–70)
ANION GAP SERPL CALCULATED.3IONS-SCNC: 12 MMOL/L (ref 7–15)
AST SERPL W P-5'-P-CCNC: 22 U/L (ref 0–45)
BACTERIA ASPIRATE CULT: NO GROWTH
BACTERIA ASPIRATE CULT: NO GROWTH
BILIRUB SERPL-MCNC: 0.8 MG/DL
BUN SERPL-MCNC: 18.2 MG/DL (ref 8–23)
CALCIUM SERPL-MCNC: 8.3 MG/DL (ref 8.8–10.2)
CHLORIDE SERPL-SCNC: 100 MMOL/L (ref 98–107)
CREAT SERPL-MCNC: 0.59 MG/DL (ref 0.67–1.17)
DEPRECATED HCO3 PLAS-SCNC: 23 MMOL/L (ref 22–29)
EGFRCR SERPLBLD CKD-EPI 2021: >90 ML/MIN/1.73M2
GLUCOSE BLDC GLUCOMTR-MCNC: 97 MG/DL (ref 70–99)
GLUCOSE SERPL-MCNC: 108 MG/DL (ref 70–99)
INR PPP: 1.15 (ref 0.85–1.15)
MAGNESIUM SERPL-MCNC: 2.2 MG/DL (ref 1.7–2.3)
PHOSPHATE SERPL-MCNC: 3.7 MG/DL (ref 2.5–4.5)
PLATELET # BLD AUTO: 747 10E3/UL (ref 150–450)
POTASSIUM SERPL-SCNC: 4.2 MMOL/L (ref 3.4–5.3)
PREALB SERPL-MCNC: 8.1 MG/DL (ref 20–40)
PROT SERPL-MCNC: 6.5 G/DL (ref 6.4–8.3)
SODIUM SERPL-SCNC: 135 MMOL/L (ref 135–145)

## 2024-05-06 PROCEDURE — 85049 AUTOMATED PLATELET COUNT: CPT | Performed by: COLON & RECTAL SURGERY

## 2024-05-06 PROCEDURE — 120N000001 HC R&B MED SURG/OB

## 2024-05-06 PROCEDURE — 250N000013 HC RX MED GY IP 250 OP 250 PS 637

## 2024-05-06 PROCEDURE — 97535 SELF CARE MNGMENT TRAINING: CPT | Mod: GO

## 2024-05-06 PROCEDURE — 84100 ASSAY OF PHOSPHORUS: CPT

## 2024-05-06 PROCEDURE — 84134 ASSAY OF PREALBUMIN: CPT

## 2024-05-06 PROCEDURE — 250N000013 HC RX MED GY IP 250 OP 250 PS 637: Performed by: COLON & RECTAL SURGERY

## 2024-05-06 PROCEDURE — 80053 COMPREHEN METABOLIC PANEL: CPT

## 2024-05-06 PROCEDURE — 83735 ASSAY OF MAGNESIUM: CPT

## 2024-05-06 PROCEDURE — 250N000011 HC RX IP 250 OP 636: Performed by: PHYSICIAN ASSISTANT

## 2024-05-06 PROCEDURE — G0463 HOSPITAL OUTPT CLINIC VISIT: HCPCS

## 2024-05-06 PROCEDURE — 97116 GAIT TRAINING THERAPY: CPT | Mod: GP | Performed by: PHYSICAL THERAPY ASSISTANT

## 2024-05-06 PROCEDURE — 97530 THERAPEUTIC ACTIVITIES: CPT | Mod: GP | Performed by: PHYSICAL THERAPY ASSISTANT

## 2024-05-06 PROCEDURE — 85610 PROTHROMBIN TIME: CPT

## 2024-05-06 RX ADMIN — HEPARIN SODIUM 5000 UNITS: 5000 INJECTION, SOLUTION INTRAVENOUS; SUBCUTANEOUS at 06:23

## 2024-05-06 RX ADMIN — HEPARIN SODIUM 5000 UNITS: 5000 INJECTION, SOLUTION INTRAVENOUS; SUBCUTANEOUS at 13:19

## 2024-05-06 RX ADMIN — MELATONIN 5 MG TABLET 5 MG: at 21:19

## 2024-05-06 RX ADMIN — FAMOTIDINE 20 MG: 20 TABLET, FILM COATED ORAL at 21:19

## 2024-05-06 RX ADMIN — FAMOTIDINE 20 MG: 20 TABLET, FILM COATED ORAL at 09:00

## 2024-05-06 RX ADMIN — HEPARIN SODIUM 5000 UNITS: 5000 INJECTION, SOLUTION INTRAVENOUS; SUBCUTANEOUS at 21:19

## 2024-05-06 RX ADMIN — LIDOCAINE 2 PATCH: 4 PATCH TOPICAL at 09:00

## 2024-05-06 RX ADMIN — ACETAMINOPHEN 650 MG: 325 TABLET, FILM COATED ORAL at 13:19

## 2024-05-06 ASSESSMENT — ACTIVITIES OF DAILY LIVING (ADL)
ADLS_ACUITY_SCORE: 27
ADLS_ACUITY_SCORE: 26
ADLS_ACUITY_SCORE: 27
ADLS_ACUITY_SCORE: 27
ADLS_ACUITY_SCORE: 25
ADLS_ACUITY_SCORE: 29
ADLS_ACUITY_SCORE: 25
ADLS_ACUITY_SCORE: 29
ADLS_ACUITY_SCORE: 26
ADLS_ACUITY_SCORE: 26
ADLS_ACUITY_SCORE: 25
ADLS_ACUITY_SCORE: 27
ADLS_ACUITY_SCORE: 26
ADLS_ACUITY_SCORE: 26
ADLS_ACUITY_SCORE: 27
ADLS_ACUITY_SCORE: 25
ADLS_ACUITY_SCORE: 26
ADLS_ACUITY_SCORE: 26
ADLS_ACUITY_SCORE: 27
ADLS_ACUITY_SCORE: 25
ADLS_ACUITY_SCORE: 26

## 2024-05-06 NOTE — PLAN OF CARE
Goal Outcome Evaluation:    Date & Time: 5/4/24  [id number]   Surgery/POD#: POD 12 exploratory laparotomy, resection of colorectal anastamois, abdominal washout & end colostomy   Behavior & Aggression: Green   Fall Risk: Yes   Orientation:A&ox4 intermittent confusion   ABNL VS/O2: VSS   ABNL Labs:  , 109   Pain Management:  Denies pain overnight   Bowel/Bladder: Continent of bowel/bladder   Drains: PICC line, MARIA ISABEL drain, Colostomy   Wounds/incisions: 3 lap sites, midline incision, previous incisions from prior to admission sigmoid colectomy   Diet: Regular diet   Activity Level: Ax1 GB &W  Tests/Procedures: None   Anticipated  DC Date: Pending   Significant Information: Sitter at bedside.  Answers questions appropriately, intermittently confused, easily redirectable. No acute events overnight.

## 2024-05-06 NOTE — PROGRESS NOTES
PICC not currently utilized, order for discontinuation likely within 24 hours.  Will defer dressing change.

## 2024-05-06 NOTE — PROGRESS NOTES
CALORIE COUNT      Approximate Oral Intake for: 5/5  Calories: 562  Protein: 32 grams      Number of Meals Recorded: 2        Number of Snacks Recorded: 1      Dosing wt: 88 kg    Estimated Needs:    Calories: 6295-9611 (25-30 kcal/kg)  Protein: 105-130 grams (1.2-1.5 g/kg)      Summary:   Diet: Regular Diet Adult    Supplements: Ensure Enlive; With Meals    Weaned off TPN yesterday     PO intake yesterday = 26% of min energy needs and 30% of min protein needs    Recommendations:   Goal to remain off nutrition support = >60% of needs met via PO intake   Will increase Ensure Enlive to BID    Kcal count to continue    Leticia Vora RD, LD

## 2024-05-06 NOTE — PLAN OF CARE
Goal Outcome Evaluation:      Plan of Care Reviewed With: patient    Overall Patient Progress: improvingOverall Patient Progress: improving    Date & Time: 5/6/24 [id number]  Behavior & Aggression:green  Fall Risk: yes  Orientation:A&OX4, however intermittent confusion.  Sitter present  for intermittent confusion/frequent trips to bathroom.  Forgetful.  ABNL VS/O2:VSS, RA  ABNL Labs: K, Mag, Phos protocols all wnl today.  Pain Management:abd pain controlled with lidocaine patches and tylenol  Bowel/Bladder: Colostomy with loose brown stool and lots of flatus.  Voiding well.  Drains: MARIA ISABEL with scant milky/creamy drainage in bulb.  Wounds/incisions:  midline abdominal incision with staples, lower portion open and packed per order.   Diet:Pt tolerating regular diet without nausea. Calorie count.   Activity Level: Up ambulating with assist of 1, GB and walker.   Anticipated  DC Date: ?5/7-5/8  Significant Information: sitter present this shift.

## 2024-05-06 NOTE — PROGRESS NOTES
Care Management Follow Up    Length of Stay (days): 12    Expected Discharge Date: 05/08/2024     Concerns to be Addressed: discharge planning     Patient plan of care discussed at interdisciplinary rounds: Yes    Anticipated Discharge Disposition: Home, Home Care     Anticipated Discharge Services: None  Anticipated Discharge DME: Other (see comment) (ostomy supplies as coordinated by WOC RN)    Patient/family educated on Medicare website which has current facility and service quality ratings:    Education Provided on the Discharge Plan: Yes  Patient/Family in Agreement with the Plan: yes    Referrals Placed by CM/SW: Homecare  Private pay costs discussed: Not applicable    Additional Information:  Follow up today with patient and then wife via phone regarding Home Care preference. No preferences. Referral sent to [facility] [place] via [facility].     1200-- Accepted by [facility].     Margaret Meier RN   [facility]   Phone [phone], [employer] or [phone]

## 2024-05-06 NOTE — PLAN OF CARE
Occupational Therapy Discharge Summary    Reason for therapy discharge:    All goals and outcomes met, no further needs identified.    Progress towards therapy goal(s). See goals on Care Plan in [facility] electronic health record for goal details.  Goals met    Therapy recommendation(s):    OT goal areas met. Pt and spouse express no further ADL concerns at this time. Recommend continued PT for progression of strength and mobility.

## 2024-05-06 NOTE — CONSULTS
"SPIRITUAL HEALTH SERVICES Consult Note  [facility]  Ortho    Referral Source/Reason for Visit: assessment of spiritual and emotional needs because patient responded \"Yes\" to the question in the admission assessment, \"Do you have any spiritual or Yazidi beliefs that will affect your care?\"     Summary and Recommendations -  Pt indicates that he is feeling improved over last week and has had some relief from pain.  Pt was anointed by Father Max (04/26/2024).  Pt accepted offer of prayer and indicated that he would like to receive [facility]ion tomorrow from [facility]    Plan:  continues to be available while pt is in hospital.    Marin Garcia  Associate   [facility] Phone Line [phone]  Spiritual Health Pager [phone]    [facility] available 24/7 for emergent requests/referrals, either by paging the on-call  or by entering an ASAP/STAT consult in [employer], which will also page the on-call .    Assessment    Saw pt Castillo Alba per [facility] consult request. Wife (Rosalee) present at bedside.    Patient/Family Understanding of Illness and Goals of Care - Castillo indicates that he is feeling better with less pain and that he has been able to complete the exercises that have been recommended for him. He states that he is  not certain about a discharge date but expects to return home in the near future.    Distress and Loss - Castillo states that he feels that he has \"lost something\" as a result of his illness without being certain about what that is. He indicates that he knows life will be different after the experience of illness but is not yet certain about the specifics.    Strengths, Coping, and Resources - Castillo is supported by his wife who is frequently at bedside and by three children who have visited him regularly while in hospital. He expressed gratitude for the support he has received from family.    Meaning, Beliefs, and Spirituality - Castillo identifies as " Druze and has been anointed and received communion while in hospital.

## 2024-05-06 NOTE — PROGRESS NOTES
Colon and Rectal Surgery  Daily Progress Note    Subjective  Patient reports doing well. Pain controlled. Denies N/V. Tolerating diet. TPN waned off. Ambulating. Less confused since the flexeril had been weaned off.     Objective  Intake/Output last 24 hrs:  Temp:  [97.7  F (36.5  C)-98.8  F (37.1  C)] 97.7  F (36.5  C)  Pulse:  [66-68] 68  Resp:  [16] 16  BP: (115-134)/(65) 115/65  SpO2:  [94 %-95 %] 95 %         Physical Exam:  General: awake, alert, in no acute distress  Respiratory: non-labored breathing  Abdomen: soft, appropriately tender, non-distended              Incisions: clean, dry and intact              Drains: MARIA ISABEL with serous output    Ostomy: Pink, with gas and no stool in the bag    Pertinent Labs  Lab Results: personally reviewed.  Lab Results   Component Value Date     05/06/2024     05/05/2024     05/04/2024     10/17/2019     09/14/2010     09/13/2010    CO2 23 05/06/2024    CO2 22 05/05/2024    CO2 24 05/04/2024    CO2 26 10/17/2019    CO2 27 09/14/2010    CO2 25 09/13/2010    BUN 18.2 05/06/2024    BUN 15.8 05/05/2024    BUN 14.8 05/04/2024    BUN 17 10/17/2019    BUN 16 09/14/2010    BUN 14 09/13/2010     Lab Results   Component Value Date    WBC 13.3 05/05/2024    WBC 14.3 05/03/2024    WBC 15.7 05/02/2024    WBC 6.6 10/17/2019    WBC 8.9 09/14/2010    WBC 9.8 09/13/2010    HGB 9.0 05/05/2024    HGB 9.3 05/03/2024    HGB 9.7 05/02/2024    HGB 17.2 10/17/2019    HGB 16.6 09/14/2010    HGB 16.3 09/13/2010    HCT 27.7 05/05/2024    HCT 28.1 05/03/2024    HCT 29.1 05/02/2024    HCT 49.0 10/17/2019    HCT 48.9 09/14/2010    HCT 48.2 09/13/2010    MCV 88 05/05/2024    MCV 87 05/03/2024    MCV 87 05/02/2024    MCV 94 10/17/2019    MCV 96 09/14/2010    MCV 97 09/13/2010     05/06/2024     05/05/2024     05/03/2024     05/03/2024     10/17/2019     09/14/2010     09/13/2010       Assessment/Plan: Castillo Alba is  a 68 year old man POD#11 s/p exploratory laparotomy with takedown of colorectal anastomosis and creation of an end colostomy for management of an anastomotic leak due to a pelvic hematoma. Intermittently confused, suspect due to delirium versus medications; much improved.       - Regular diet  - BID wound cares to inferior aspect of the midline wound   - Multimodal pain control  - Ambulate. Appreciate PT/OT  - Continue MARIA ISABEL drain; will discontinue at discharge   - SQH for DVT ppx   - WOCN teaching. Will need  arranged   - Probably home in the next 1-2 days     Discussed with Dr. Ronak Smith MD on 5/6/2024 at 8:06 AM   Colon and rectal surgery fellow

## 2024-05-07 VITALS
RESPIRATION RATE: 16 BRPM | BODY MASS INDEX: 32.64 KG/M2 | HEIGHT: 72 IN | OXYGEN SATURATION: 96 % | DIASTOLIC BLOOD PRESSURE: 63 MMHG | SYSTOLIC BLOOD PRESSURE: 116 MMHG | TEMPERATURE: 98.2 F | WEIGHT: 241 LBS | HEART RATE: 63 BPM

## 2024-05-07 PROBLEM — Z93.3 COLOSTOMY STATUS (H): Status: ACTIVE | Noted: 2024-05-07

## 2024-05-07 LAB
MAGNESIUM SERPL-MCNC: 2.2 MG/DL (ref 1.7–2.3)
PHOSPHATE SERPL-MCNC: 3.6 MG/DL (ref 2.5–4.5)
POTASSIUM SERPL-SCNC: 4.1 MMOL/L (ref 3.4–5.3)

## 2024-05-07 PROCEDURE — 83735 ASSAY OF MAGNESIUM: CPT | Performed by: COLON & RECTAL SURGERY

## 2024-05-07 PROCEDURE — 250N000013 HC RX MED GY IP 250 OP 250 PS 637

## 2024-05-07 PROCEDURE — 250N000013 HC RX MED GY IP 250 OP 250 PS 637: Performed by: COLON & RECTAL SURGERY

## 2024-05-07 PROCEDURE — 999N000040 HC STATISTIC CONSULT NO CHARGE VASC ACCESS

## 2024-05-07 PROCEDURE — 250N000011 HC RX IP 250 OP 636: Performed by: PHYSICIAN ASSISTANT

## 2024-05-07 PROCEDURE — 84132 ASSAY OF SERUM POTASSIUM: CPT | Performed by: COLON & RECTAL SURGERY

## 2024-05-07 PROCEDURE — 84100 ASSAY OF PHOSPHORUS: CPT | Performed by: COLON & RECTAL SURGERY

## 2024-05-07 RX ORDER — OXYCODONE HYDROCHLORIDE 5 MG/1
5 TABLET ORAL EVERY 6 HOURS PRN
Qty: 12 TABLET | Refills: 0 | Status: SHIPPED | OUTPATIENT
Start: 2024-05-07

## 2024-05-07 RX ADMIN — HEPARIN SODIUM 5000 UNITS: 5000 INJECTION, SOLUTION INTRAVENOUS; SUBCUTANEOUS at 06:22

## 2024-05-07 RX ADMIN — LIDOCAINE 1 PATCH: 4 PATCH TOPICAL at 08:14

## 2024-05-07 RX ADMIN — HEPARIN SODIUM 5000 UNITS: 5000 INJECTION, SOLUTION INTRAVENOUS; SUBCUTANEOUS at 14:18

## 2024-05-07 RX ADMIN — ACETAMINOPHEN 650 MG: 325 TABLET, FILM COATED ORAL at 14:17

## 2024-05-07 RX ADMIN — ACETAMINOPHEN 650 MG: 325 TABLET, FILM COATED ORAL at 04:22

## 2024-05-07 RX ADMIN — FAMOTIDINE 20 MG: 20 TABLET, FILM COATED ORAL at 08:14

## 2024-05-07 ASSESSMENT — ACTIVITIES OF DAILY LIVING (ADL)
ADLS_ACUITY_SCORE: 25
ADLS_ACUITY_SCORE: 24
ADLS_ACUITY_SCORE: 25

## 2024-05-07 NOTE — PROGRESS NOTES
"[facility] Nurse Inpatient Ostomy Assessment     Assessment of new end Colostomy     Surgery date:  4-24-24  Surgeon:  Anthony  Procedure:  EXPLORATORY LAPAROTOMY, RESECTION OF COLORECTAL ANASTAMOIS, ABDOMINAL WASHOUT, END COLOSTOMY,   Related diagnosis: for anastomotic leak     WO Assessment & Physical Exam:      Current status: Met with patient,wife and son for teaching.  NG out, TPN discontinued. Pt eating solids. Up and walking/taking a few steps. More engaged today but still hesitant about active participation.     Date of last photo: LUQ colostomy                   Stoma location: LUQ  Stoma size:  1 1/2\" sligtly oval ,   Stoma appearance:  red, slightly protrudes  Mucocutaneous junction:  intact with sutures.   Peristomal  skin: intact, no erythema  Abdominal assessment:  appears less distended and semi-soft. Bandaids to IR procedure sites intact.   Surgical site(s): dressing to distal incision. Remainder incision well approximated with staples  NG still in place? No  Output: 50ccc light brown liquid looking output in appliance  Pain: 4   Is patient still on a PCA? NO    Psychosocial: family supportive    [facility] Assessment          End colostomy: viable. GI function returning        Rose-stomal skin: no complications noted        MCJ: intact with sutures. NO local s/s infection        Pouching system:  No leakage.  continue convexity.         Psychosocial: wife supportive and involved.         Education:TPt not engaged today for teaching    Objective Data:     Patient history according to medical record:         Castillo Alba is a 68 year old male, seen at the request of Dr. Rubio, who presents with worsening abdominal pain, fatigue and rectal bleeding.  He underwent a robotic assisted left colectomy with colorectal anastomosis 1 week ago for management of complicated diverticulitis.  He did have a significantly inflamed colon at the time of surgery, as well as a small bowel " loop that was adherent to the inflamed colon.  He was doing well until Monday when he developed worsening abdominal pain.  He also noticed blood in his stool.  Given that he did not have significant improvement in his symptoms, he presented to the ER for evaluation.  On presentation, he was hemodynamically stable, with a mild leukocytosis, normal hemoglobin and otherwise relatively unremarkable labs.  He underwent a CT scan of the abdomen and pelvis which shows a large amount of free air in the upper abdomen and concern for a possible intra-abdominal fluid collection near his colorectal anastomosis.     Current Diet/Nutrition:  TPN   initiated   Orders Placed This Encounter      Regular Diet Adult     Output:  I/O last 3 completed shifts:  In: 640 [P.O.:640]  Out: 4 [Drains:4]    Labs:   Recent Labs   Lab 05/06/24  0623 05/05/24  0635   ALBUMIN 2.6*  --    PREALB 8.1*  --    HGB  --  9.0*   INR 1.15  --    WBC  --  13.3*       Jerald Risk Assessment:   Sensory Perception: 4-->no impairment  Moisture: 4-->rarely moist  Activity: 3-->walks occasionally  Mobility: 4-->no limitation  Nutrition: 3-->adequate  Friction and Shear: 3-->no apparent problem  Jerald Score: 21      WOC Interventions:     Patient's chart evaluated  Focus of today's visit:  Stoma and applinace assessment  Pouch changed: [place] NI convex barier with ring and drainable pouch  Participant(s) in teaching session today:  wife,son and patient. Wife changed pouch with verbal cues and no  hands on assist. Reviewed emptying. Discussed use of closed pouch which pt thinks he will prefer.   Supplies: placed @ bedside   Preparation for discharge:  AVS completed.   Registered for supply samples? Pt has supplies for home.   Discussed plan of care with: Pt , wife and son  WOC follow-up: Pt ready for discharge from WOC standpoint.     Kaya Beaulieu RN, CWOCN  -Securely message with [employer] (more info) - can reach individually by name or search 'WOC Nurse'  [place]) to reach all current WOCs on duty.  -WOC Office Phone: [phone]

## 2024-05-07 NOTE — PLAN OF CARE
Date & Time: 5/6 7pm- 5/7 7am  Surgery/POD#: POD 13 ex lap, resection of colorectal anastamois, abdominal washout, end colostomy  Behavior & Aggression: green  Fall Risk: yes  Orientation: AO x4, forgetful at times, long-arm present overnight, calls appropriately  ABNL VS/O2: VSS on RA  ABNL Labs: K, mag, phos recheck this AM  Pain Management: denied pain  Bowel/Bladder: colostomy w/ small output, voids spontaneously  Drains: MARIA ISABEL drain w/ small output, PICC SL  Wounds/incisions: ML abd incision w/ dressing changed at the bottom CDI, MARIA ISABEL site CDI  Diet: Reg/ calorie count  Activity Level: A1 gb/w  Anticipated  DC Date: pending  Significant Information: PICC dressing changed 5/7

## 2024-05-07 NOTE — PLAN OF CARE
PT-  Pt discharged home today with assist of family as needed.   Home PT was recommended.  PT goals not met.

## 2024-05-07 NOTE — PROGRESS NOTES
Colon and Rectal Surgery  Daily Progress Note    Subjective  Patient reports doing well. Less confused, although continues to have intermittent forgetful and confused behavior per nursing. Tolerating diet although still low calorie intake. Pain controlled.      Objective  Intake/Output last 24 hrs:  Temp:  [97.5  F (36.4  C)-98.9  F (37.2  C)] 97.5  F (36.4  C)  Pulse:  [63-68] 63  Resp:  [16-19] 17  BP: ()/(53-67) 136/67  SpO2:  [94 %-95 %] 94 %         Physical Exam:  General: awake, alert, in no acute distress  Respiratory: non-labored breathing  Abdomen: soft, appropriately tender, non-distended              Incisions: clean, dry and inferior portion of the wound is packed              Drains: MARIA ISABEL with serous output               Ostomy: Pink, with gas and stool in the bag    Pertinent Labs  Lab Results: personally reviewed.  Lab Results   Component Value Date     05/06/2024     05/05/2024     05/04/2024     10/17/2019     09/14/2010     09/13/2010    CO2 23 05/06/2024    CO2 22 05/05/2024    CO2 24 05/04/2024    CO2 26 10/17/2019    CO2 27 09/14/2010    CO2 25 09/13/2010    BUN 18.2 05/06/2024    BUN 15.8 05/05/2024    BUN 14.8 05/04/2024    BUN 17 10/17/2019    BUN 16 09/14/2010    BUN 14 09/13/2010     Lab Results   Component Value Date    WBC 13.3 05/05/2024    WBC 14.3 05/03/2024    WBC 15.7 05/02/2024    WBC 6.6 10/17/2019    WBC 8.9 09/14/2010    WBC 9.8 09/13/2010    HGB 9.0 05/05/2024    HGB 9.3 05/03/2024    HGB 9.7 05/02/2024    HGB 17.2 10/17/2019    HGB 16.6 09/14/2010    HGB 16.3 09/13/2010    HCT 27.7 05/05/2024    HCT 28.1 05/03/2024    HCT 29.1 05/02/2024    HCT 49.0 10/17/2019    HCT 48.9 09/14/2010    HCT 48.2 09/13/2010    MCV 88 05/05/2024    MCV 87 05/03/2024    MCV 87 05/02/2024    MCV 94 10/17/2019    MCV 96 09/14/2010    MCV 97 09/13/2010     05/06/2024     05/05/2024     05/03/2024     05/03/2024     10/17/2019      09/14/2010     09/13/2010       Assessment/Plan: Castillo Alba is a 68 year old man POD#12 s/p exploratory laparotomy with takedown of colorectal anastomosis and creation of an end colostomy for management of an anastomotic leak due to a pelvic hematoma.       - Regular diet  - BID wound cares to inferior aspect of the midline wound   - Multimodal pain control  - Ambulate. Appreciate PT/OT  - Continue MARIA ISABEL drain; will discontinue at discharge   - SQH for DVT ppx   - WOCN teaching. Will need HH arranged   - Probably home in today vs tomorrow pending all teaching completed and HH arranged      Discussed with Dr. Ronak Smith MD on 5/7/2024 at 6:53 AM   Colon and rectal surgery fellow

## 2024-05-07 NOTE — PROGRESS NOTES
CALORIE COUNT      Approximate Oral Intake for: 5/5  Calories: 1372  Protein: 60 grams      Number of Meals Recorded: 3        Number of Snacks Recorded: 1      Dosing wt: 88 kg    Estimated Needs:    Calories: 0096-1115 (25-30 kcal/kg)  Protein: 105-130 grams (1.2-1.5 g/kg)      Summary:   Diet: Regular Diet Adult    Supplements: Ensure Enlive; With Meals    PO intake yesterday = 62% of min energy needs and 57% of min protein needs    Recommendations:   Goal to remain off nutrition support = >60% of needs met via PO intake   Pt requested Ensure Enlive be discontinued     3-day summary to follow    Leticia Vora RD, LD

## 2024-05-07 NOTE — DISCHARGE SUMMARY
[facility] Discharge Summary      Castillo Alba MRN# [id number]   Age: 68 year old YOB: 1955     Date of Admission:  4/24/2024  Date of Discharge::  5/7/2024  Admitting Physician:  Alejandra Cruz MD  Discharge Physician:  Alejandra Cruz MD     PCP:  Martín Morley    Disposition: Patient discharged from [facility] to home in stable condition.        Primary Diagnosis:   Pelvic hematoma and anastomotic leak from colorectal anastomosis causing abdominal sepsis with feculent peritonitis      Addendum 5/9/24 per coding query: Sepsis considered and ruled out, Intra-Abdominal Infection Only          Discharge Medications:     Current Discharge Medication List        CONTINUE these medications which have CHANGED    Details   oxyCODONE (ROXICODONE) 5 MG tablet Take 1 tablet (5 mg) by mouth every 6 hours as needed for moderate to severe pain  Qty: 12 tablet, Refills: 0    Associated Diagnoses: Diverticulitis of large intestine with perforation and abscess without bleeding           CONTINUE these medications which have NOT CHANGED    Details   acetaminophen (TYLENOL) 325 MG tablet Take 2 tablets (650 mg) by mouth every 6 hours as needed for mild pain  Qty: 100 tablet, Refills: 0    Associated Diagnoses: Diverticulitis of intestine with abscess and bleeding, unspecified part of intestinal tract      celecoxib (CELEBREX) 200 MG capsule Take 200 mg by mouth daily      cyclobenzaprine (FLEXERIL) 10 MG tablet Take 1 tablet by mouth 3 times daily                    Follow Up, Special Instructions:     Discharge diet: Low residue   Discharge activity: Lifting restricted to <15 pounds for 6 weeks   Discharge follow-up: Follow up with [facility] next week for staple removal   Wound care: May get incision wet in shower but do not soak or scrub. Change packing at bottom of incision twice daily.              Procedures:     Procedure(s): 1.   Exploratory laparotomy with abdominal washout  2.  Resection of colorectal anastomosis  3.  Creation of end colostomy  4.  Rigid proctoscopy                  Consultations:   WOCN, spiritual health, OT, nutrition, vascular access, IR, SW/CC          Brief Hospital Summary:     Patient is a 68 year old male who underwent an elective robotic converted to open descending and sigmoid colectomy for management of chronic, complicated diverticulitis with Dr. Cruz on 4/17. He initially recovered well, but on 4/24 (POD#7) he was advised to present to the ED after calling in with worsening abdominal pain, fevers, and new hematochezia. CT abd/pelvis unfortunately showed a significant amount of free intraperitoneal air and a complex fluid collection around the colorectal anastomosis concerning for possible anastomotic leak. Dr. Cruz took him back to the OR on 4/24 for an exploratory laparotomy with abdominal washout, resection of colorectal anastomosis, and end colostomy creation. There was feculent peritonitis with a large pelvic hematoma around the anastomosis, and a defect was noted in one of the staple lines of the anastomosis.   There were no immediate complications during this procedure.  Please refer to the full operative summary for details.     The patient's hospital course was overall unremarkable. He did have a post-op ileus which was managed conservatively with NGT decompression, bowel rest, and TPN. The ileus was slow to resolve so CT abd/pelvis was done 5/1, which showed large intraabdominal fluid collections. IR was consulted and performed US guided aspiration of the fluid collections, which helped the ileus resolve. He did get antibiotics for 5 days post op, including PO vancomycin given his history of c diff. Pain was controlled on oral pain regimen.  He was tolerating a low fiber diet.  Bowel function had returned prior to discharge.  He recovered as anticipated and experienced no significant  post-operative complications.         Attestation:  I have reviewed today's vital signs, notes, medications, labs and imaging.    Minesh Doherty PA-C  Colorectal Physician Assistant    [facility]  [address] [facility] [address]  [place] [address]  T: [phone]  F: [phone]         ADDENDUM:  Length of stay: 12 days  Indicate Y or N for the following:  UTI  No  C diff  No  PNA  No  SSI Yes  DVT No  PE  No  CVA No  MI No  Enterocutaneous fistula  No  Peripheral nerve injury  No  Abscess (not adjacent to anastomosis)  No  Leak No    Treated with:   Antibiotics N/A   Drain  N/A   Reoperation  N/A  Death within 30 days No  Reintubation  No  Reoperation  No   Procedure n/a

## 2024-05-07 NOTE — DISCHARGE INSTRUCTIONS
[facility]: Appliance last changed on 5-6-24  Please reinforce general colostomy management  Reinforce appliance change with wife  Reinforce emptying drainable pouch and/or changing of closed pouches   Pt has supplies for discharge     Surgery date:  4/24/2024   Surgeon:  Anthony  Procedure: EXPLORATORY LAPAROTOMY, RESECTION OF COLORECTAL ANASTAMOIS, ABDOMINAL WASHOUT, END COLOSTOMY, f    Related diagnosis: anastomotic leak following Lt colectomy with anastomosis for complicated diverticulitis     1. Change barrier  1-2  times a week and as needed for leakage.    2. Empty and/or change change closed pouch  when 1/3 full of stool/gas.  3. Will not harm appliance to get wet during bathing. Blow dry (on cool setting) cloth backing and tape on appliance       Following shower.   4. Initially eat 6 small meals/day. No long-term dietary restrictions related to colostomy.  5. Drink plenty of fluids.  6. Always carry an emergency pouch - can prepare it ahead of time (cut out opening and apply ring).   7. If concerned about odor use an odor eliminator (ie. Febreeze) in your bathroom prior to emptying/changing the appliance.  8. No heavy lifting, no sit-ups but walk frequently.  9. OK to take stairs.     Supplies:  [employer] (20 appliance changes / month)  Barrier: [employer]  57mm CTF with tape               #09008   5/bx = 4bx/month   Pouch: Drainable ultra clear lock n roll pouch   #85951       10bx = 2bx/month  Pouch: opaque closed pouch, no filter         #[id number]        60bx = 1bx/month  Adapt ring:                                                        #8805         10/bx = 2bx/month      Procedure:                     #  Draw and Cut pattern in barrier following pattern. Cut to outside of drawn line  Remove plastic backing  Fold back edge of paper that covers the tape to create a tab (this makes paper removal easier in Step #8  )  Open ring and remove wax paper from each side. Stretch ring open to size of opening on back  "of barrier.  Place ring around opening, Press down with fingers.   Set barrier aside  Gently remove appliance from aroiund Colostomy. Discard.  8.  Gently clean skin around stoma with water. Pat skin dry.   7.   Press barrier down to skin   8. .  Pull on\"tab\"  to remove paper from the tape. . Press tape to down to skin  12. Snap of pouch of choice        Online ostomy resources: [employer] website, has good videos - https://www.BearTail/en/ostomycare/educationaltools   -See also [email or web address]; Ostomy.org    Follow-up as needed (after home care is done):      CWOCNs (ostomy nurses)  Clinic room # 743 in [facility]  Office phone # [phone] (Mon - Fri) - call for any questions or concerns  "

## 2024-05-07 NOTE — PLAN OF CARE
Date & Time: 5/7 0937-4161  Surgery/POD#: 13 x lap resection of colorectal anastomosis, washout, & end colostomy  Behavior & Aggression: Green  Fall Risk: Yes  Orientation: A&Ox4  ABNL VS/O2: VSS  ABNL Labs: Albumin 2.6  Pain Management: Tylenol  Bowel/Bladder: Voiding adequately. Colostomy + flatus + 10 mL soft stool. 60 mL out colostomy total (CRS aware)  Drains: MARIA ISABEL, PICC & PIV removed  Wounds/incisions: Abdominal incision packing changed & dressing change teaching completed with family  Diet:Low fiber, poor appetite, CRS aware  Activity Level: SBA, GB  Tests/Procedures: NA  Anticipated  DC Date: Home today with family assist & HHC. AVS given & reviewed, all questions answered. Sent home with all belongings & discharge meds  Significant Information: Rash to all extremities, family & patient thinking it is due to skin sensitivity. They will monitor and follow up if it does not improve.

## 2024-05-09 ENCOUNTER — PATIENT OUTREACH (OUTPATIENT)
Dept: CARE COORDINATION | Facility: CLINIC | Age: 69
End: 2024-05-09
Payer: COMMERCIAL

## 2024-05-09 LAB
BACTERIA ASPIRATE CULT: ABNORMAL

## 2024-05-09 NOTE — PROGRESS NOTES
Connected Care Resource Center Contact  [facility]/Voicemail     Clinical Data: Post-Discharge Outreach     Outreach attempted x 2.  Left message on patient's voicemail, providing [facility]'s central phone number of [phone] ([phone]) for questions/concerns and/or to schedule an appt with an [facility] provider, if they do not have a PCP.      Plan:  General acute hospital will do no further outreaches at this time.       KARLOS Caba  Connected Care Resource Center, [facility]    *Connected Care Resource Team does NOT follow patient ongoing. Referrals are identified based on internal discharge reports and the outreach is to ensure patient has an understanding of their discharge instructions.

## 2024-05-15 ENCOUNTER — DOCUMENTATION ONLY (OUTPATIENT)
Dept: PHYSICAL THERAPY | Facility: CLINIC | Age: 69
End: 2024-05-15
Payer: COMMERCIAL

## 2024-05-15 DIAGNOSIS — K57.20 ABSCESS OF SIGMOID COLON DUE TO DIVERTICULITIS: Primary | ICD-10-CM

## 2024-08-07 ENCOUNTER — PREP FOR PROCEDURE (OUTPATIENT)
Dept: SURGERY | Facility: CLINIC | Age: 69
End: 2024-08-07
Payer: COMMERCIAL

## 2024-08-07 DIAGNOSIS — K57.20 DIVERTICULITIS OF LARGE INTESTINE WITH PERFORATION AND ABSCESS WITHOUT BLEEDING: Primary | ICD-10-CM

## 2024-11-04 RX ORDER — BUPROPION HYDROCHLORIDE 300 MG/1
300 TABLET ORAL EVERY MORNING
COMMUNITY

## 2024-11-04 RX ORDER — ONDANSETRON 4 MG/1
4 TABLET, ORALLY DISINTEGRATING ORAL EVERY 8 HOURS PRN
Status: ON HOLD | COMMUNITY
End: 2024-11-08

## 2024-11-04 RX ORDER — NEOMYCIN SULFATE 500 MG/1
1000 TABLET ORAL
Status: ON HOLD | COMMUNITY
End: 2024-11-08

## 2024-11-04 RX ORDER — METRONIDAZOLE 500 MG/1
500 TABLET ORAL
Status: ON HOLD | COMMUNITY
End: 2024-11-08

## 2024-11-04 NOTE — PROGRESS NOTES
PTA medications updated by Medication Scribe prior to surgery via phone call with patient (last doses completed by Nurse)     Medication history sources: Patient, Surescripts, and H&P  In the past week, patient estimated taking medication this percent of the time: Greater than 90%      Significant changes made to the medication list:  Patient reports no longer taking the following meds (med scribe removed from PTA med list): Tylenol, Celebrex, Flexeril, oxycodone      Additional medication history information:   None    Medication reconciliation completed by provider prior to medication history? No    Time spent in this activity: 20 minutes    The information provided in this note is only as accurate as the sources available at the time of update(s)      Prior to Admission medications    Medication Sig Last Dose Taking? Auth Provider Long Term End Date   buPROPion (WELLBUTRIN XL) 300 MG 24 hr tablet Take 300 mg by mouth every morning. 10/28/2024 Morning Yes Reported, Patient Yes    metroNIDAZOLE (FLAGYL) 500 MG tablet Take 500 mg by mouth. Three times day before surgery; at 1pm, 2pm, and 11pm 11:00 PM Yes Reported, Patient No    neomycin (MYCIFRADIN) 500 MG tablet Take 1,000 mg by mouth. Three times day before surgery; at 1pm, 2pm, and 11pm 11:00 PM Yes Reported, Patient     ondansetron (ZOFRAN ODT) 4 MG ODT tab Take 4 mg by mouth every 8 hours as needed for nausea. Three times day before surgery; at 1pm, 2pm, and 11pm  Yes Reported, Patient         Medication history completed by: Elvin Olson

## 2024-11-05 ENCOUNTER — ANESTHESIA EVENT (OUTPATIENT)
Dept: SURGERY | Facility: CLINIC | Age: 69
DRG: 330 | End: 2024-11-05
Payer: COMMERCIAL

## 2024-11-05 ASSESSMENT — LIFESTYLE VARIABLES: TOBACCO_USE: 1

## 2024-11-06 ENCOUNTER — ANESTHESIA (OUTPATIENT)
Dept: SURGERY | Facility: CLINIC | Age: 69
DRG: 330 | End: 2024-11-06
Payer: COMMERCIAL

## 2024-11-06 ENCOUNTER — HOSPITAL ENCOUNTER (INPATIENT)
Facility: CLINIC | Age: 69
LOS: 3 days | Discharge: HOME | DRG: 330 | End: 2024-11-09
Attending: COLON & RECTAL SURGERY | Admitting: COLON & RECTAL SURGERY
Payer: COMMERCIAL

## 2024-11-06 DIAGNOSIS — Z93.3 COLOSTOMY IN PLACE (H): Primary | ICD-10-CM

## 2024-11-06 LAB
ABO/RH(D): NORMAL
ANION GAP SERPL CALCULATED.3IONS-SCNC: 13 MMOL/L (ref 7–15)
ANTIBODY SCREEN: NEGATIVE
BUN SERPL-MCNC: 14.8 MG/DL (ref 8–23)
CALCIUM SERPL-MCNC: 9.4 MG/DL (ref 8.8–10.4)
CHLORIDE SERPL-SCNC: 101 MMOL/L (ref 98–107)
CREAT SERPL-MCNC: 0.78 MG/DL (ref 0.67–1.17)
EGFRCR SERPLBLD CKD-EPI 2021: >90 ML/MIN/1.73M2
ERYTHROCYTE [DISTWIDTH] IN BLOOD BY AUTOMATED COUNT: 14.2 % (ref 10–15)
GLUCOSE SERPL-MCNC: 123 MG/DL (ref 70–99)
HCO3 SERPL-SCNC: 22 MMOL/L (ref 22–29)
HCT VFR BLD AUTO: 47.6 % (ref 40–53)
HGB BLD-MCNC: 15.2 G/DL (ref 13.3–17.7)
HGB BLD-MCNC: 16.2 G/DL (ref 13.3–17.7)
MCH RBC QN AUTO: 31 PG (ref 26.5–33)
MCHC RBC AUTO-ENTMCNC: 34 G/DL (ref 31.5–36.5)
MCV RBC AUTO: 91 FL (ref 78–100)
PLATELET # BLD AUTO: 249 10E3/UL (ref 150–450)
POTASSIUM SERPL-SCNC: 4.2 MMOL/L (ref 3.4–5.3)
RBC # BLD AUTO: 5.22 10E6/UL (ref 4.4–5.9)
SODIUM SERPL-SCNC: 136 MMOL/L (ref 135–145)
SPECIMEN EXPIRATION DATE: NORMAL
WBC # BLD AUTO: 7 10E3/UL (ref 4–11)

## 2024-11-06 PROCEDURE — 370N000017 HC ANESTHESIA TECHNICAL FEE, PER MIN: Performed by: COLON & RECTAL SURGERY

## 2024-11-06 PROCEDURE — 250N000011 HC RX IP 250 OP 636: Performed by: ANESTHESIOLOGY

## 2024-11-06 PROCEDURE — 360N000080 HC SURGERY LEVEL 7, PER MIN: Performed by: COLON & RECTAL SURGERY

## 2024-11-06 PROCEDURE — 0WQF0ZZ REPAIR ABDOMINAL WALL, OPEN APPROACH: ICD-10-PCS | Performed by: COLON & RECTAL SURGERY

## 2024-11-06 PROCEDURE — 250N000011 HC RX IP 250 OP 636: Performed by: COLON & RECTAL SURGERY

## 2024-11-06 PROCEDURE — 258N000003 HC RX IP 258 OP 636: Performed by: ANESTHESIOLOGY

## 2024-11-06 PROCEDURE — 258N000001 HC RX 258: Performed by: COLON & RECTAL SURGERY

## 2024-11-06 PROCEDURE — 86900 BLOOD TYPING SEROLOGIC ABO: CPT | Performed by: COLON & RECTAL SURGERY

## 2024-11-06 PROCEDURE — 258N000003 HC RX IP 258 OP 636: Performed by: COLON & RECTAL SURGERY

## 2024-11-06 PROCEDURE — 0DN84ZZ RELEASE SMALL INTESTINE, PERCUTANEOUS ENDOSCOPIC APPROACH: ICD-10-PCS | Performed by: COLON & RECTAL SURGERY

## 2024-11-06 PROCEDURE — 250N000025 HC SEVOFLURANE, PER MIN: Performed by: COLON & RECTAL SURGERY

## 2024-11-06 PROCEDURE — 52005 CYSTO W/URTRL CATHJ: CPT | Performed by: NURSE ANESTHETIST, CERTIFIED REGISTERED

## 2024-11-06 PROCEDURE — 0DNW4ZZ RELEASE PERITONEUM, PERCUTANEOUS ENDOSCOPIC APPROACH: ICD-10-PCS | Performed by: COLON & RECTAL SURGERY

## 2024-11-06 PROCEDURE — 80048 BASIC METABOLIC PNL TOTAL CA: CPT | Performed by: COLON & RECTAL SURGERY

## 2024-11-06 PROCEDURE — 0DSM4ZZ REPOSITION DESCENDING COLON, PERCUTANEOUS ENDOSCOPIC APPROACH: ICD-10-PCS | Performed by: COLON & RECTAL SURGERY

## 2024-11-06 PROCEDURE — 250N000009 HC RX 250: Performed by: COLON & RECTAL SURGERY

## 2024-11-06 PROCEDURE — 0DBM4ZZ EXCISION OF DESCENDING COLON, PERCUTANEOUS ENDOSCOPIC APPROACH: ICD-10-PCS | Performed by: COLON & RECTAL SURGERY

## 2024-11-06 PROCEDURE — 710N000009 HC RECOVERY PHASE 1, LEVEL 1, PER MIN: Performed by: COLON & RECTAL SURGERY

## 2024-11-06 PROCEDURE — 52005 CYSTO W/URTRL CATHJ: CPT | Performed by: ANESTHESIOLOGY

## 2024-11-06 PROCEDURE — 250N000013 HC RX MED GY IP 250 OP 250 PS 637: Performed by: COLON & RECTAL SURGERY

## 2024-11-06 PROCEDURE — 4A1BXSH MONITORING OF GASTROINTESTINAL VASCULAR PERFUSION USING INDOCYANINE GREEN DYE, EXTERNAL APPROACH: ICD-10-PCS | Performed by: COLON & RECTAL SURGERY

## 2024-11-06 PROCEDURE — 85027 COMPLETE CBC AUTOMATED: CPT | Performed by: COLON & RECTAL SURGERY

## 2024-11-06 PROCEDURE — 8E0W4CZ ROBOTIC ASSISTED PROCEDURE OF TRUNK REGION, PERCUTANEOUS ENDOSCOPIC APPROACH: ICD-10-PCS | Performed by: COLON & RECTAL SURGERY

## 2024-11-06 PROCEDURE — 88304 TISSUE EXAM BY PATHOLOGIST: CPT | Mod: 26 | Performed by: PATHOLOGY

## 2024-11-06 PROCEDURE — 999N000141 HC STATISTIC PRE-PROCEDURE NURSING ASSESSMENT: Performed by: COLON & RECTAL SURGERY

## 2024-11-06 PROCEDURE — 272N000001 HC OR GENERAL SUPPLY STERILE: Performed by: COLON & RECTAL SURGERY

## 2024-11-06 PROCEDURE — 36415 COLL VENOUS BLD VENIPUNCTURE: CPT | Performed by: COLON & RECTAL SURGERY

## 2024-11-06 PROCEDURE — 250N000009 HC RX 250: Performed by: ANESTHESIOLOGY

## 2024-11-06 PROCEDURE — 120N000001 HC R&B MED SURG/OB

## 2024-11-06 PROCEDURE — 0DBP4ZZ EXCISION OF RECTUM, PERCUTANEOUS ENDOSCOPIC APPROACH: ICD-10-PCS | Performed by: COLON & RECTAL SURGERY

## 2024-11-06 PROCEDURE — 82310 ASSAY OF CALCIUM: CPT | Performed by: COLON & RECTAL SURGERY

## 2024-11-06 PROCEDURE — 88304 TISSUE EXAM BY PATHOLOGIST: CPT | Mod: TC | Performed by: COLON & RECTAL SURGERY

## 2024-11-06 PROCEDURE — 52005 CYSTO W/URTRL CATHJ: CPT | Performed by: UROLOGY

## 2024-11-06 PROCEDURE — 85018 HEMOGLOBIN: CPT | Performed by: COLON & RECTAL SURGERY

## 2024-11-06 PROCEDURE — P9045 ALBUMIN (HUMAN), 5%, 250 ML: HCPCS | Performed by: ANESTHESIOLOGY

## 2024-11-06 RX ORDER — LIDOCAINE 40 MG/G
CREAM TOPICAL
Status: DISCONTINUED | OUTPATIENT
Start: 2024-11-06 | End: 2024-11-09 | Stop reason: HOSPADM

## 2024-11-06 RX ORDER — CEFAZOLIN SODIUM/WATER 2 G/20 ML
2 SYRINGE (ML) INTRAVENOUS
Status: COMPLETED | OUTPATIENT
Start: 2024-11-06 | End: 2024-11-06

## 2024-11-06 RX ORDER — NALOXONE HYDROCHLORIDE 0.4 MG/ML
0.2 INJECTION, SOLUTION INTRAMUSCULAR; INTRAVENOUS; SUBCUTANEOUS
Status: DISCONTINUED | OUTPATIENT
Start: 2024-11-06 | End: 2024-11-09 | Stop reason: HOSPADM

## 2024-11-06 RX ORDER — OXYCODONE HYDROCHLORIDE 5 MG/1
10 TABLET ORAL EVERY 4 HOURS PRN
Status: DISCONTINUED | OUTPATIENT
Start: 2024-11-06 | End: 2024-11-09 | Stop reason: HOSPADM

## 2024-11-06 RX ORDER — MAGNESIUM HYDROXIDE 1200 MG/15ML
LIQUID ORAL PRN
Status: DISCONTINUED | OUTPATIENT
Start: 2024-11-06 | End: 2024-11-06 | Stop reason: HOSPADM

## 2024-11-06 RX ORDER — HEPARIN SODIUM 5000 [USP'U]/.5ML
5000 INJECTION, SOLUTION INTRAVENOUS; SUBCUTANEOUS
Status: COMPLETED | OUTPATIENT
Start: 2024-11-06 | End: 2024-11-06

## 2024-11-06 RX ORDER — LIDOCAINE HYDROCHLORIDE 20 MG/ML
INJECTION, SOLUTION INFILTRATION; PERINEURAL PRN
Status: DISCONTINUED | OUTPATIENT
Start: 2024-11-06 | End: 2024-11-06

## 2024-11-06 RX ORDER — HEPARIN SODIUM 5000 [USP'U]/.5ML
5000 INJECTION, SOLUTION INTRAVENOUS; SUBCUTANEOUS EVERY 8 HOURS
Status: DISCONTINUED | OUTPATIENT
Start: 2024-11-08 | End: 2024-11-07

## 2024-11-06 RX ORDER — ONDANSETRON 2 MG/ML
4 INJECTION INTRAMUSCULAR; INTRAVENOUS EVERY 6 HOURS PRN
Status: DISCONTINUED | OUTPATIENT
Start: 2024-11-06 | End: 2024-11-09 | Stop reason: HOSPADM

## 2024-11-06 RX ORDER — ONDANSETRON 4 MG/1
4 TABLET, ORALLY DISINTEGRATING ORAL EVERY 6 HOURS PRN
Status: DISCONTINUED | OUTPATIENT
Start: 2024-11-06 | End: 2024-11-09 | Stop reason: HOSPADM

## 2024-11-06 RX ORDER — OXYCODONE HYDROCHLORIDE 5 MG/1
5 TABLET ORAL EVERY 4 HOURS PRN
Status: DISCONTINUED | OUTPATIENT
Start: 2024-11-06 | End: 2024-11-09 | Stop reason: HOSPADM

## 2024-11-06 RX ORDER — DEXAMETHASONE SODIUM PHOSPHATE 4 MG/ML
INJECTION, SOLUTION INTRA-ARTICULAR; INTRALESIONAL; INTRAMUSCULAR; INTRAVENOUS; SOFT TISSUE PRN
Status: DISCONTINUED | OUTPATIENT
Start: 2024-11-06 | End: 2024-11-06

## 2024-11-06 RX ORDER — HYDROXYZINE HYDROCHLORIDE 50 MG/ML
25 INJECTION, SOLUTION INTRAMUSCULAR ONCE
Status: COMPLETED | OUTPATIENT
Start: 2024-11-06 | End: 2024-11-06

## 2024-11-06 RX ORDER — ONDANSETRON 4 MG/1
4 TABLET, ORALLY DISINTEGRATING ORAL EVERY 30 MIN PRN
Status: DISCONTINUED | OUTPATIENT
Start: 2024-11-06 | End: 2024-11-06 | Stop reason: HOSPADM

## 2024-11-06 RX ORDER — ONDANSETRON 2 MG/ML
4 INJECTION INTRAMUSCULAR; INTRAVENOUS EVERY 30 MIN PRN
Status: DISCONTINUED | OUTPATIENT
Start: 2024-11-06 | End: 2024-11-06 | Stop reason: HOSPADM

## 2024-11-06 RX ORDER — DEXAMETHASONE SODIUM PHOSPHATE 4 MG/ML
4 INJECTION, SOLUTION INTRA-ARTICULAR; INTRALESIONAL; INTRAMUSCULAR; INTRAVENOUS; SOFT TISSUE
Status: DISCONTINUED | OUTPATIENT
Start: 2024-11-06 | End: 2024-11-06 | Stop reason: HOSPADM

## 2024-11-06 RX ORDER — NALOXONE HYDROCHLORIDE 0.4 MG/ML
0.4 INJECTION, SOLUTION INTRAMUSCULAR; INTRAVENOUS; SUBCUTANEOUS
Status: DISCONTINUED | OUTPATIENT
Start: 2024-11-06 | End: 2024-11-09 | Stop reason: HOSPADM

## 2024-11-06 RX ORDER — ACETAMINOPHEN 325 MG/1
650 TABLET ORAL EVERY 4 HOURS PRN
Status: DISCONTINUED | OUTPATIENT
Start: 2024-11-09 | End: 2024-11-09 | Stop reason: HOSPADM

## 2024-11-06 RX ORDER — FENTANYL CITRATE 0.05 MG/ML
25 INJECTION, SOLUTION INTRAMUSCULAR; INTRAVENOUS EVERY 5 MIN PRN
Status: DISCONTINUED | OUTPATIENT
Start: 2024-11-06 | End: 2024-11-06 | Stop reason: HOSPADM

## 2024-11-06 RX ORDER — BUPROPION HYDROCHLORIDE 150 MG/1
300 TABLET ORAL EVERY MORNING
Status: DISCONTINUED | OUTPATIENT
Start: 2024-11-07 | End: 2024-11-09 | Stop reason: HOSPADM

## 2024-11-06 RX ORDER — FENTANYL CITRATE 50 UG/ML
INJECTION, SOLUTION INTRAMUSCULAR; INTRAVENOUS PRN
Status: DISCONTINUED | OUTPATIENT
Start: 2024-11-06 | End: 2024-11-06

## 2024-11-06 RX ORDER — TAMSULOSIN HYDROCHLORIDE 0.4 MG/1
0.4 CAPSULE ORAL DAILY
Status: DISCONTINUED | OUTPATIENT
Start: 2024-11-06 | End: 2024-11-09 | Stop reason: HOSPADM

## 2024-11-06 RX ORDER — HYDROMORPHONE HCL IN WATER/PF 6 MG/30 ML
0.2 PATIENT CONTROLLED ANALGESIA SYRINGE INTRAVENOUS
Status: DISCONTINUED | OUTPATIENT
Start: 2024-11-06 | End: 2024-11-09 | Stop reason: HOSPADM

## 2024-11-06 RX ORDER — INDOCYANINE GREEN AND WATER 25 MG
KIT INJECTION PRN
Status: DISCONTINUED | OUTPATIENT
Start: 2024-11-06 | End: 2024-11-06

## 2024-11-06 RX ORDER — ONDANSETRON 2 MG/ML
INJECTION INTRAMUSCULAR; INTRAVENOUS PRN
Status: DISCONTINUED | OUTPATIENT
Start: 2024-11-06 | End: 2024-11-06

## 2024-11-06 RX ORDER — EPHEDRINE SULFATE 50 MG/ML
INJECTION, SOLUTION INTRAMUSCULAR; INTRAVENOUS; SUBCUTANEOUS PRN
Status: DISCONTINUED | OUTPATIENT
Start: 2024-11-06 | End: 2024-11-06

## 2024-11-06 RX ORDER — LABETALOL HYDROCHLORIDE 5 MG/ML
10 INJECTION, SOLUTION INTRAVENOUS
Status: DISCONTINUED | OUTPATIENT
Start: 2024-11-06 | End: 2024-11-06 | Stop reason: HOSPADM

## 2024-11-06 RX ORDER — SODIUM CHLORIDE, SODIUM LACTATE, POTASSIUM CHLORIDE, CALCIUM CHLORIDE 600; 310; 30; 20 MG/100ML; MG/100ML; MG/100ML; MG/100ML
INJECTION, SOLUTION INTRAVENOUS CONTINUOUS
Status: DISCONTINUED | OUTPATIENT
Start: 2024-11-06 | End: 2024-11-06 | Stop reason: HOSPADM

## 2024-11-06 RX ORDER — CEFAZOLIN SODIUM/WATER 2 G/20 ML
2 SYRINGE (ML) INTRAVENOUS SEE ADMIN INSTRUCTIONS
Status: DISCONTINUED | OUTPATIENT
Start: 2024-11-06 | End: 2024-11-06 | Stop reason: HOSPADM

## 2024-11-06 RX ORDER — HYDROMORPHONE HCL IN WATER/PF 6 MG/30 ML
0.4 PATIENT CONTROLLED ANALGESIA SYRINGE INTRAVENOUS EVERY 5 MIN PRN
Status: DISCONTINUED | OUTPATIENT
Start: 2024-11-06 | End: 2024-11-06 | Stop reason: HOSPADM

## 2024-11-06 RX ORDER — METRONIDAZOLE 500 MG/100ML
500 INJECTION, SOLUTION INTRAVENOUS
Status: COMPLETED | OUTPATIENT
Start: 2024-11-06 | End: 2024-11-06

## 2024-11-06 RX ORDER — PROPOFOL 10 MG/ML
INJECTION, EMULSION INTRAVENOUS CONTINUOUS PRN
Status: DISCONTINUED | OUTPATIENT
Start: 2024-11-06 | End: 2024-11-06

## 2024-11-06 RX ORDER — BUPIVACAINE HYDROCHLORIDE 5 MG/ML
INJECTION, SOLUTION PERINEURAL PRN
Status: DISCONTINUED | OUTPATIENT
Start: 2024-11-06 | End: 2024-11-06 | Stop reason: HOSPADM

## 2024-11-06 RX ORDER — HYDROMORPHONE HCL IN WATER/PF 6 MG/30 ML
0.2 PATIENT CONTROLLED ANALGESIA SYRINGE INTRAVENOUS EVERY 5 MIN PRN
Status: DISCONTINUED | OUTPATIENT
Start: 2024-11-06 | End: 2024-11-06 | Stop reason: HOSPADM

## 2024-11-06 RX ORDER — SODIUM CHLORIDE, SODIUM LACTATE, POTASSIUM CHLORIDE, CALCIUM CHLORIDE 600; 310; 30; 20 MG/100ML; MG/100ML; MG/100ML; MG/100ML
INJECTION, SOLUTION INTRAVENOUS CONTINUOUS PRN
Status: DISCONTINUED | OUTPATIENT
Start: 2024-11-06 | End: 2024-11-06

## 2024-11-06 RX ORDER — ACETAMINOPHEN 325 MG/1
975 TABLET ORAL EVERY 8 HOURS
Status: COMPLETED | OUTPATIENT
Start: 2024-11-06 | End: 2024-11-09

## 2024-11-06 RX ORDER — HYDROMORPHONE HCL IN WATER/PF 6 MG/30 ML
0.4 PATIENT CONTROLLED ANALGESIA SYRINGE INTRAVENOUS
Status: DISCONTINUED | OUTPATIENT
Start: 2024-11-06 | End: 2024-11-09 | Stop reason: HOSPADM

## 2024-11-06 RX ORDER — ACETAMINOPHEN 325 MG/1
975 TABLET ORAL ONCE
Status: COMPLETED | OUTPATIENT
Start: 2024-11-06 | End: 2024-11-06

## 2024-11-06 RX ORDER — VECURONIUM BROMIDE 1 MG/ML
INJECTION, POWDER, LYOPHILIZED, FOR SOLUTION INTRAVENOUS PRN
Status: DISCONTINUED | OUTPATIENT
Start: 2024-11-06 | End: 2024-11-06

## 2024-11-06 RX ORDER — NALOXONE HYDROCHLORIDE 0.4 MG/ML
0.1 INJECTION, SOLUTION INTRAMUSCULAR; INTRAVENOUS; SUBCUTANEOUS
Status: DISCONTINUED | OUTPATIENT
Start: 2024-11-06 | End: 2024-11-06 | Stop reason: HOSPADM

## 2024-11-06 RX ORDER — ONDANSETRON 2 MG/ML
4 INJECTION INTRAMUSCULAR; INTRAVENOUS ONCE
Status: COMPLETED | OUTPATIENT
Start: 2024-11-06 | End: 2024-11-06

## 2024-11-06 RX ORDER — FENTANYL CITRATE 0.05 MG/ML
50 INJECTION, SOLUTION INTRAMUSCULAR; INTRAVENOUS EVERY 5 MIN PRN
Status: DISCONTINUED | OUTPATIENT
Start: 2024-11-06 | End: 2024-11-06 | Stop reason: HOSPADM

## 2024-11-06 RX ORDER — SODIUM CHLORIDE, SODIUM LACTATE, POTASSIUM CHLORIDE, CALCIUM CHLORIDE 600; 310; 30; 20 MG/100ML; MG/100ML; MG/100ML; MG/100ML
INJECTION, SOLUTION INTRAVENOUS CONTINUOUS
Status: DISCONTINUED | OUTPATIENT
Start: 2024-11-06 | End: 2024-11-07

## 2024-11-06 RX ORDER — SIMETHICONE 80 MG
80 TABLET,CHEWABLE ORAL 4 TIMES DAILY PRN
Status: DISCONTINUED | OUTPATIENT
Start: 2024-11-06 | End: 2024-11-09 | Stop reason: HOSPADM

## 2024-11-06 RX ADMIN — Medication 2 G: at 11:58

## 2024-11-06 RX ADMIN — HYDROMORPHONE HYDROCHLORIDE 0.4 MG: 0.2 INJECTION, SOLUTION INTRAMUSCULAR; INTRAVENOUS; SUBCUTANEOUS at 15:05

## 2024-11-06 RX ADMIN — FENTANYL CITRATE 50 MCG: 50 INJECTION INTRAMUSCULAR; INTRAVENOUS at 10:00

## 2024-11-06 RX ADMIN — PHENYLEPHRINE HYDROCHLORIDE 0.1 MCG/KG/MIN: 10 INJECTION INTRAVENOUS at 10:51

## 2024-11-06 RX ADMIN — Medication 10 MG: at 09:43

## 2024-11-06 RX ADMIN — VECURONIUM BROMIDE 5 MG: 1 INJECTION, POWDER, LYOPHILIZED, FOR SOLUTION INTRAVENOUS at 08:52

## 2024-11-06 RX ADMIN — HYDROXYZINE HYDROCHLORIDE 25 MG: 50 INJECTION, SOLUTION INTRAMUSCULAR at 14:53

## 2024-11-06 RX ADMIN — SODIUM CHLORIDE, POTASSIUM CHLORIDE, SODIUM LACTATE AND CALCIUM CHLORIDE: 600; 310; 30; 20 INJECTION, SOLUTION INTRAVENOUS at 12:12

## 2024-11-06 RX ADMIN — PROPOFOL 40 MCG/KG/MIN: 10 INJECTION, EMULSION INTRAVENOUS at 08:25

## 2024-11-06 RX ADMIN — INDOCYANINE GREEN AND WATER 10 MG: KIT at 12:21

## 2024-11-06 RX ADMIN — LIDOCAINE HYDROCHLORIDE 60 MG: 20 INJECTION, SOLUTION INFILTRATION; PERINEURAL at 08:05

## 2024-11-06 RX ADMIN — METRONIDAZOLE 500 MG: 500 INJECTION, SOLUTION INTRAVENOUS at 07:09

## 2024-11-06 RX ADMIN — HYDROMORPHONE HYDROCHLORIDE 0.5 MG: 1 INJECTION, SOLUTION INTRAMUSCULAR; INTRAVENOUS; SUBCUTANEOUS at 10:07

## 2024-11-06 RX ADMIN — MINERAL OIL AND PETROLATUM 0.5 INCH: 150; 830 OINTMENT OPHTHALMIC at 08:11

## 2024-11-06 RX ADMIN — ALBUMIN (HUMAN): 12.5 SOLUTION INTRAVENOUS at 14:05

## 2024-11-06 RX ADMIN — ACETAMINOPHEN 975 MG: 325 TABLET, FILM COATED ORAL at 07:09

## 2024-11-06 RX ADMIN — HYDROMORPHONE HYDROCHLORIDE 0.5 MG: 1 INJECTION, SOLUTION INTRAMUSCULAR; INTRAVENOUS; SUBCUTANEOUS at 13:59

## 2024-11-06 RX ADMIN — HYDROMORPHONE HYDROCHLORIDE 0.4 MG: 0.2 INJECTION, SOLUTION INTRAMUSCULAR; INTRAVENOUS; SUBCUTANEOUS at 15:28

## 2024-11-06 RX ADMIN — PHENYLEPHRINE HYDROCHLORIDE 100 MCG: 10 INJECTION INTRAVENOUS at 08:46

## 2024-11-06 RX ADMIN — ONDANSETRON 4 MG: 2 INJECTION INTRAMUSCULAR; INTRAVENOUS at 13:33

## 2024-11-06 RX ADMIN — SODIUM CHLORIDE, POTASSIUM CHLORIDE, SODIUM LACTATE AND CALCIUM CHLORIDE: 600; 310; 30; 20 INJECTION, SOLUTION INTRAVENOUS at 08:00

## 2024-11-06 RX ADMIN — VECURONIUM BROMIDE 5 MG: 1 INJECTION, POWDER, LYOPHILIZED, FOR SOLUTION INTRAVENOUS at 09:41

## 2024-11-06 RX ADMIN — VECURONIUM BROMIDE 2 MG: 1 INJECTION, POWDER, LYOPHILIZED, FOR SOLUTION INTRAVENOUS at 12:50

## 2024-11-06 RX ADMIN — HYDROMORPHONE HYDROCHLORIDE 0.4 MG: 0.2 INJECTION, SOLUTION INTRAMUSCULAR; INTRAVENOUS; SUBCUTANEOUS at 20:27

## 2024-11-06 RX ADMIN — ALBUMIN (HUMAN): 12.5 SOLUTION INTRAVENOUS at 08:55

## 2024-11-06 RX ADMIN — ONDANSETRON 4 MG: 2 INJECTION, SOLUTION INTRAMUSCULAR; INTRAVENOUS at 07:29

## 2024-11-06 RX ADMIN — VECURONIUM BROMIDE 2 MG: 1 INJECTION, POWDER, LYOPHILIZED, FOR SOLUTION INTRAVENOUS at 11:45

## 2024-11-06 RX ADMIN — SODIUM CHLORIDE, POTASSIUM CHLORIDE, SODIUM LACTATE AND CALCIUM CHLORIDE: 600; 310; 30; 20 INJECTION, SOLUTION INTRAVENOUS at 17:58

## 2024-11-06 RX ADMIN — PHENYLEPHRINE HYDROCHLORIDE 100 MCG: 10 INJECTION INTRAVENOUS at 09:10

## 2024-11-06 RX ADMIN — HYDROMORPHONE HYDROCHLORIDE 0.4 MG: 0.2 INJECTION, SOLUTION INTRAMUSCULAR; INTRAVENOUS; SUBCUTANEOUS at 17:58

## 2024-11-06 RX ADMIN — VECURONIUM BROMIDE 10 MG: 1 INJECTION, POWDER, LYOPHILIZED, FOR SOLUTION INTRAVENOUS at 08:07

## 2024-11-06 RX ADMIN — PHENYLEPHRINE HYDROCHLORIDE 0.2 MCG/KG/MIN: 10 INJECTION INTRAVENOUS at 09:10

## 2024-11-06 RX ADMIN — PROPOFOL 150 MG: 10 INJECTION, EMULSION INTRAVENOUS at 08:05

## 2024-11-06 RX ADMIN — DEXAMETHASONE SODIUM PHOSPHATE 4 MG: 4 INJECTION, SOLUTION INTRA-ARTICULAR; INTRALESIONAL; INTRAMUSCULAR; INTRAVENOUS; SOFT TISSUE at 08:27

## 2024-11-06 RX ADMIN — PHENYLEPHRINE HYDROCHLORIDE 100 MCG: 10 INJECTION INTRAVENOUS at 08:54

## 2024-11-06 RX ADMIN — ALBUMIN (HUMAN): 12.5 SOLUTION INTRAVENOUS at 09:55

## 2024-11-06 RX ADMIN — VECURONIUM BROMIDE 2 MG: 1 INJECTION, POWDER, LYOPHILIZED, FOR SOLUTION INTRAVENOUS at 10:49

## 2024-11-06 RX ADMIN — FENTANYL CITRATE 50 MCG: 50 INJECTION, SOLUTION INTRAMUSCULAR; INTRAVENOUS at 14:49

## 2024-11-06 RX ADMIN — Medication 2 G: at 08:01

## 2024-11-06 RX ADMIN — Medication 200 MG: at 14:17

## 2024-11-06 RX ADMIN — HEPARIN SODIUM 5000 UNITS: 5000 INJECTION, SOLUTION INTRAVENOUS; SUBCUTANEOUS at 07:40

## 2024-11-06 RX ADMIN — FENTANYL CITRATE 50 MCG: 50 INJECTION INTRAMUSCULAR; INTRAVENOUS at 08:04

## 2024-11-06 ASSESSMENT — ACTIVITIES OF DAILY LIVING (ADL)
ADLS_ACUITY_SCORE: 0

## 2024-11-06 NOTE — ANESTHESIA PREPROCEDURE EVALUATION
Anesthesia Pre-Procedure Evaluation    Patient: Castillo Alba   MRN: [id number] : 1955        Procedure : Procedure(s):  ROBOTIC ASSISTED COLOSTOMY CLOSURE  CYSTOSCOPY, WITH URETERAL OPEN ENDED CATHETER INSERTION - BILATERAL          Past Medical History:   Diagnosis Date    C. difficile colitis 2023    Diverticulosis     History of dental abscess     Sinusitis, chronic     trouble breathing through nose    Ventral hernia without obstruction or gangrene 2024      Past Surgical History:   Procedure Laterality Date    COLONOSCOPY N/A 2024    Procedure: Colonoscopy;  Surgeon: Alejandra Cruz MD;  Location:  GI    DAVINCI XI ASSISTED RESECTION RECTOSIGMOID N/A 2024    Procedure: Robotic assisted sigmoid colectomy, mobilization of splenic flexor;  Surgeon: Alejandra Cruz MD;  Location:  OR    drain placement  2024    LAPAROTOMY, LYSIS ADHESIONS, COMBINED N/A 2024    Procedure: EXPLORATORY LAPAROTOMY, RESECTION OF COLORECTAL ANASTAMOIS, ABDOMINAL WASHOUT, END COLOSTOMY;  Surgeon: Alejandra Cruz MD;  Location:  OR    NOSE SURGERY        No Known Allergies   Social History     Tobacco Use    Smoking status: Former     Current packs/day: 0.00     Types: Cigarettes     Quit date: 2024     Years since quittin.5    Smokeless tobacco: Never   Substance Use Topics    Alcohol use: Not Currently      Wt Readings from Last 1 Encounters:   24 109.3 kg (241 lb)        Anesthesia Evaluation   Pt has had prior anesthetic.     No history of anesthetic complications       ROS/MED HX  ENT/Pulmonary:     (+)                tobacco use, Past use,                       Neurologic:       Cardiovascular:     (+)  - -   -  - -                                 Previous cardiac testing   Echo: Date: Results:    Stress Test:  Date: Results:    ECG Reviewed:  Date:  Results:  NSR  Cath:  Date: Results:      METS/Exercise Tolerance:     Hematologic:     (+)       "anemia,          Musculoskeletal:       GI/Hepatic: Comment: S/p colectomy for diverticulitis.  Now planning ostomy reversal and hernia repair      Renal/Genitourinary:       Endo:     (+)               Obesity,       Psychiatric/Substance Use:     (+) psychiatric history depression       Infectious Disease:       Malignancy:       Other:            Physical Exam    Airway        Mallampati: II   TM distance: > 3 FB   Neck ROM: full   Mouth opening: > 3 cm    Respiratory Devices and Support         Dental  no notable dental history     (+) Minor Abnormalities - some fillings, tiny chips      Cardiovascular   cardiovascular exam normal          Pulmonary   pulmonary exam normal        breath sounds clear to auscultation           OUTSIDE LABS:  CBC:   Lab Results   Component Value Date    WBC 13.3 (H) 05/05/2024    WBC 14.3 (H) 05/03/2024    HGB 9.0 (L) 05/05/2024    HGB 9.3 (L) 05/03/2024    HCT 27.7 (L) 05/05/2024    HCT 28.1 (L) 05/03/2024     (H) 05/06/2024     (H) 05/05/2024     BMP:   Lab Results   Component Value Date     05/06/2024     (L) 05/05/2024    POTASSIUM 4.1 05/07/2024    POTASSIUM 4.2 05/06/2024    CHLORIDE 100 05/06/2024    CHLORIDE 97 (L) 05/05/2024    CO2 23 05/06/2024    CO2 22 05/05/2024    BUN 18.2 05/06/2024    BUN 15.8 05/05/2024    CR 0.59 (L) 05/06/2024    CR 0.61 (L) 05/05/2024    GLC 97 05/06/2024     (H) 05/06/2024     COAGS:   Lab Results   Component Value Date    INR 1.15 05/06/2024     POC: No results found for: \"BGM\", \"HCG\", \"HCGS\"  HEPATIC:   Lab Results   Component Value Date    ALBUMIN 2.6 (L) 05/06/2024    PROTTOTAL 6.5 05/06/2024    ALT 17 05/06/2024    AST 22 05/06/2024    ALKPHOS 201 (H) 05/06/2024    BILITOTAL 0.8 05/06/2024     OTHER:   Lab Results   Component Value Date    LACT 1.2 02/18/2024    LENORE 8.3 (L) 05/06/2024    PHOS 3.6 05/07/2024    MAG 2.2 05/07/2024       Anesthesia Plan    ASA Status:  3    NPO Status:  NPO Appropriate  "   Anesthesia Type: General.     - Airway: ETT   Induction: Intravenous.   Maintenance: Balanced.   Techniques and Equipment:     - Airway: Video-Laryngoscope     - Lines/Monitors: 2nd IV     - Blood: T&S     - Drips/Meds: Phenylephrine     Consents    Anesthesia Plan(s) and associated risks, benefits, and realistic alternatives discussed. Questions answered and patient/representative(s) expressed understanding.     - Discussed:     - Discussed with:  Patient      - Extended Intubation/Ventilatory Support Discussed: No.      - Patient is DNR/DNI Status: No     Use of blood products discussed: Yes.     - Discussed with: Patient.     - Consented: consented to blood products            Reason for refusal: other.     Postoperative Care    Pain management: IV analgesics, Oral pain medications, Multi-modal analgesia.   PONV prophylaxis: Ondansetron (or other 5HT-3), Dexamethasone or Solumedrol     Comments:    Other Comments: Videolaryngoscope for intubation.  2nd IV.  Have IV fluid bag set up.  Albumin available.           Gino Olivier, DO    I have reviewed the pertinent notes and labs in the chart from the past 30 days and (re)examined the patient.  Any updates or changes from those notes are reflected in this note.                             # Financial/Environmental Concerns:

## 2024-11-06 NOTE — ANESTHESIA PROCEDURE NOTES
Airway       Patient location during procedure: OR       Procedure Start/Stop Times: 11/6/2024 8:09 AM  Staff -        Anesthesiologist:  Gino Olivier DO       CRNA: Andree Camara APRN CRNA       Other Anesthesia Staff: Lorenza Goode       Performed By: JON and with CRNAs       Procedure performed by resident/fellow/CRNA in presence of a teaching physician.    Consent for Airway        Urgency: elective  Indications and Patient Condition       Indications for airway management: karen-procedural       Induction type:intravenous       Mask difficulty assessment: 2 - vent by mask + OA or adjuvant +/- NMBA    Final Airway Details       Final airway type: endotracheal airway       Successful airway: ETT - single  Endotracheal Airway Details        ETT size (mm): 8.0       Cuffed: yes       Successful intubation technique: video laryngoscopy       VL Blade Size: Glidescope 4       Grade View of Cords: 1       Adjucts: stylet       Position: Right       Measured from: gums/teeth       Secured at (cm): 23       Bite block used: None    Post intubation assessment        Placement verified by: capnometry, equal breath sounds and chest rise        Number of attempts at approach: 1       Number of other approaches attempted: 0       Secured with: tape       Ease of procedure: easy       Dentition: Intact and Unchanged    Medication(s) Administered   Medication Administration Time: 11/6/2024 8:09 AM

## 2024-11-06 NOTE — OP NOTE
PREOPERATIVE DIAGNOSIS: Garcia's procedure with end colostomy, undesired colostomy     POSTOPERATIVE DIAGNOSIS: Same     OPERATION PERFORMED:   1. Robotic colostomy closure with anastomosis at 12 cm  2. Extensive lysis of adhesions lasting 180 minutes  3. Intra-operative fluorescence angiography with ICG    4. Rigid proctoscopy with anastomotic leak testing  5. Parastomal hernia repair   6. Cystoscopy and bilateral ureteral stent placement (Dr. Mayberry)     Surgeon:  Alejandra Cruz MD  Co-Surgeon: Hermann Mayberry MD  Assistant: OLIVIA Esquivel MD Colon and Rectal Surgery Fellow     ANESTHESIA: General.      INDICATION: Castillo Alba is a 69 year old an who underwent emergent Garcia's procedure for management of an anastomotic leak after a sigmoid colectomy for diverticular disease. He recovered well from surgery, and presents today for colostomy closure. The risks and benefits of a robotic assisted colostomy closure were discussed and the patient agreed to proceed.      OPERATIVE FINDINGS:  Extensive small bowel adhesions in the pelvis and along the left sidewall, colorectal anastomosis performed at 12 cm from the anal verge, large parastomal hernia     PROCEDURE IN DETAIL: After informed consent was obtained, the patient was brought to the operating room and placed in the supine position on the operating room table. Sequential compression devices were placed on the lower extremities prior to induction and general anesthesia was induced without difficulty. The patient was placed into a well-padded dorsal lithotomy position and IV antibiotics were administered. A Pigasi pad was used on the operating room table to prevent the patient from sliding off the table in steep Trendelenburg position.  Urology then performed a cystoscopy and bilateral stent placement.  They also placed a Zamora.  There was some trauma with Zamora placement due to an enlarged prostate with some bleeding.  The abdomen  was then sterilely prepped and draped in standard fashion. A timeout was performed confirming the patient, procedure and operative plan.      An incision was made around the colostomy at the mucocutaneous junction.  Dissection was then carried down to the level of the fascia, mobilizing the colon from the subcutaneous tissues.  There was a large redundant hernia sac from his parastomal hernia at the colostomy site.  Eventually we were able to enter the abdominal cavity and fully free the colostomy from all adhesions.  A window was then made approximately 2 cm from the end of the colostomy in the mesentery.  The mesentery was divided between clamps and ligated with 0 Vicryl ties.  A Pipe clamp was then placed on the bowel and the bowel divided with a 10 blade.  A 2-0 double-armed Prolene suture was then used to create a pursestring in the colon conduit.  The anvil from the 29 mm EEA stapler was placed in the colonic conduit and the suture tightened around the anvil.  A second pursestring was then created to ensure good approximation of the bowel tissue around the anvil.  The colon was then returned to the abdomen.  A gel point device was then placed through the colostomy site, and the abdomen insufflated to 15 mmHg.      An 8 mm bladeless trocar was inserted into the abdominal cavity through the gel point mini.  The abdomen was then explored and the operative findings are noted above. There were some omental adhesions to the anterior abdominal wall. There were significant small bowel adhesions in the pelvis.  Three additional robotic ports were placed under direct visualization, 12 mm in the right mid-abdomen and two 8 mm trochars in the left abdomen and near midline.  An 5 mm air seal was then placed superior to the robotic trochars in between the right and supraumbilical port sites.  We then performed a laparoscopic lysis of adhesions and mobilization of the splenic flexure.  The colon conduit was dissected free  from the lateral sidewall, as well as free from the omentum entering the lesser sac.  There were also small bowel adhesions to the left upper quadrant which were taken down with a combination of blunt dissection, sharp dissection and electrocautery.  Eventually we were able to adequately mobilize the colon conduit for appropriate reach to the pelvis.     The patient was then placed in steep Trendelenburg position. We then docked the robot using a tip up grasper in arm 1, a fenestrated bipolar in arm 2, the camera in arm 3 and monopolar scissors in arm 4.   We then began our extensive lysis of adhesions which lasted 180 minutes.  There were extensive small bowel adhesions to the pelvic brim, left pelvic sidewall and right pelvic sidewall.  We were able to carefully mobilize the small bowel out of the pelvis.  Once the small bowel was mobilized off of the pelvis, we were able to locate our rectal stump.  We mobilized the rectal stump in the presacral plane and then extended this mobilization laterally.  We opened the peritoneum bilaterally to mobilize the rectal stump.  Once we had adequate mobilization of the rectal stump, we passed a dilator through the anus and was able to almost reach the top of the rectal stump.  The extent where she was able to easily pass the dilator was the chosen point of transection of the rectal stump.  A mesenteric window was created with the vessel sealer, and the rectum was divided with a 2 firings of the 45 mm robotic stapler, green load.  The rectal stump was then placed in the right upper quadrant and was removed through the gel point at the end of the case.     We then assessed the reach of our descending colon conduit to the pelvis. The colon conduit had adequate length for comfortable reach into the pelvis. We then injected ICG intravenously, and performed fluorescence angiography to confirm good perfusion of both our colon conduit and our rectum.     I then went below performed a  leak test of the rectal stump.  The rectal stump was airtight and appeared well-perfused.  I then inserted serial dilators, and finally inserted the stapling instrument into the anal canal, advancing the spike just posterior to the staple line of the rectal stump. The 2 ends of the stapler were connected, the orientation of the colon conduit confirmed and the stapler tightened and fired. Two anastomotic donuts were completely intact. The anastomosis was tested by insufflating from below through the proctoscope with the anastomosis under saline. There was no evidence of leak despite multiple insufflations. The anastomosis was under no tension and there was excellent blood supply on both sides of the anastomosis. Excess air was evacuated from below through the proctoscope.       The abdomen was irrigated with a copious amount of saline solution. Hemostasis was evident. We then proceeded with closing the 12 mm robotic stapler port under direct visualization with #0 Vicryl sutures using the Bennie-Loco device. Surgicel powder was then placed in the pelvis. The abdomen was suctioned dry and was then desufflated.  We then proceeded with closing the colostomy site and his parastomal hernia in the left abdomen.  The parastomal hernia sac was excised from the subcutaneous tissues.  The fascia was then cleared of subcutaneous fat.  We then identified both the anterior and the posterior fascial layers.  The posterior fascia was closed with interrupted figure-of-eight 0-PDS sutures, and this was repeated for the anterior fascia.  The wound was then irrigated, and the skin was closed loosely with a 2-0 Vicryl suture placed in a pursestring fashion in the dermis.  Packing was then placed in the old colostomy site and a dry dressing applied. All other robotic incisions were irrigated with saline solution and the skin was closed with 4-0 Monocryl sutures the skin dressed with skin glue.      This operation cannot have been  safely performed without compromising the technical results of the procedure without a skilled surgical assistant.  The surgical assistant was necessary for positioning, intraoperative retraction, as well as management of the robotic instruments at bedside.  They were also necessary for creation of the colorectal anastomosis and meticulous wound closure. They ensured that the procedure could be completed in a technically safe and efficient manner.    The patient tolerated this procedure well without complications. The patient was extubated in the operating room and brought to the recovery room in stable condition.      EBL: 50 ml  SPECIMEN: colostomy, rectal stump  COMPLICATIONS: none

## 2024-11-06 NOTE — BRIEF OP NOTE
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.3]  Post-operative diagnosis Same as pre-operative diagnosis    Procedure: ROBOTIC ASSISTED COLOSTOMY CLOSURE, N/A - Abdomen  CYSTOSCOPY, WITH URETERAL OPEN ENDED CATHETER INSERTION - BILATERAL, Bilateral - Urethra    Surgeon: Surgeons and Role:  Panel 1:     * Alejandra Cruz MD - Primary     * Minesh Doherty PA-C - Assisting     * Diane Simons MD - Assisting  Panel 2:     * Hermann Mayberry MD - Primary  Anesthesia: General   Estimated Blood Loss: 50cc    Drains: None  Specimens:   ID Type Source Tests Collected by Time Destination   1 : Colostomy Tissue Large Intestine, Colon SURGICAL PATHOLOGY EXAM Alejandra Cruz MD 11/6/2024  9:26 AM    2 : RECTAL STUMP Tissue Rectum SURGICAL PATHOLOGY EXAM Alejandra Cruz MD 11/6/2024  1:29 PM      Findings:   None.  Complications: None.  Implants: * No implants in log *    POSTOPERATIVE / POSTPROCEDURE NOTE - IMMEDIATE :    Surgeon(s)/Proceduralist(s) and Assistants (if any):  Surgeon(s):  Hermann Mayberry MD Sirany, Anne-Marie E, MD Michels, Kollin D, PA-C Midura, Emily Frances, MD  Circulator: Marina Gandhi RN; Veronika Thomson RN; Rajan iKng RN  Physician Assistant: Danielle Ramos PA-C  Relief Circulator: Miko Johnson RN  Relief Scrub: Edith Ramirez; Lorenza Lopez  Scrub Person: Natalie Braun    Procedure(s):  Robot-assisted colostomy reversal    Procedure(s) findings:   Moderate adhesive disease, proximal portion of rectal stump excised to perform anastomosis, 28mm end to end EEA stapled anastomosis performed, negative leak test, intact anastomotic donuts    Specimen(s) removed: Yes    (EBL) Estimated blood loss (ml): 50    Postoperative/Postprocedure Diagnosis: presence of undesired colostomy      Rusty Whaley MD      ADDENDUM:    PATIENT DATA  Indicate Y or N:  Home O2 No  Hemodialysis  No  Transplant patient No  Cirrhosis No  Steroids in last 30 days No  Immunomodulators in last 30 days No  Anticoagulation at time of surgery No   List medication   Prior abdominal surgery Yes  Pelvic irradiation No    Albumin within 30 days if known   Hgb within 30 days if known 16.2  Cr within 30 days if known 0.78  Body mass index is 29.32 kg/m .    OR DATA  Emergent No   <24 hours No   <1 week No  Bowel Prep (Y or N) Yes  Antibiotics (Y or N) Yes  DVT prophylaxis    Heparin Yes   SCD Yes   None No  Drain No  ASA (1,2,3,4,5 if unknown) 2  OR time (min) 325  Stents Yes  Transfuse >/= 2U No  Anastomosis   Stapled Yes   Handsewn No  Leak Test (pos, neg, not done) negative

## 2024-11-06 NOTE — ANESTHESIA CARE TRANSFER NOTE
Patient: Castillo Alba    Procedure: Procedure(s):  ROBOTIC ASSISTED COLOSTOMY CLOSURE  CYSTOSCOPY, WITH URETERAL OPEN ENDED CATHETER INSERTION - BILATERAL       Diagnosis: Colostomy status (H) [Z93.3]  Diagnosis Additional Information: No value filed.    Anesthesia Type:   General     Note:    Oropharynx: oropharynx clear of all foreign objects and spontaneously breathing  Level of Consciousness: awake and drowsy  Oxygen Supplementation: face mask  Level of Supplemental Oxygen (L/min / FiO2): 6  Independent Airway: airway patency satisfactory and stable  Dentition: dentition unchanged  Vital Signs Stable: post-procedure vital signs reviewed and stable  Report to RN Given: handoff report given  Patient transferred to: PACU    Handoff Report: Identifed the Patient, Identified the Reponsible Provider, Reviewed the pertinent medical history, Discussed the surgical course, Reviewed Intra-OP anesthesia mangement and issues during anesthesia, Set expectations for post-procedure period and Allowed opportunity for questions and acknowledgement of understanding      Vitals:  Vitals Value Taken Time   BP     Temp     Pulse 70 11/06/24 1436   Resp 10 11/06/24 1436   SpO2 99 % 11/06/24 1435   Vitals shown include unfiled device data.    Electronically Signed By: VENANCIO Cardona CRNA  November 6, 2024  2:37 PM

## 2024-11-07 LAB
ANION GAP SERPL CALCULATED.3IONS-SCNC: 10 MMOL/L (ref 7–15)
BUN SERPL-MCNC: 14.1 MG/DL (ref 8–23)
CALCIUM SERPL-MCNC: 8.9 MG/DL (ref 8.8–10.4)
CHLORIDE SERPL-SCNC: 105 MMOL/L (ref 98–107)
CREAT SERPL-MCNC: 0.82 MG/DL (ref 0.67–1.17)
EGFRCR SERPLBLD CKD-EPI 2021: >90 ML/MIN/1.73M2
GLUCOSE SERPL-MCNC: 94 MG/DL (ref 70–99)
HCO3 SERPL-SCNC: 24 MMOL/L (ref 22–29)
HGB BLD-MCNC: 14.1 G/DL (ref 13.3–17.7)
MAGNESIUM SERPL-MCNC: 1.9 MG/DL (ref 1.7–2.3)
PATH REPORT.COMMENTS IMP SPEC: NORMAL
PATH REPORT.COMMENTS IMP SPEC: NORMAL
PATH REPORT.FINAL DX SPEC: NORMAL
PATH REPORT.GROSS SPEC: NORMAL
PATH REPORT.MICROSCOPIC SPEC OTHER STN: NORMAL
PATH REPORT.RELEVANT HX SPEC: NORMAL
PHOSPHATE SERPL-MCNC: 2.9 MG/DL (ref 2.5–4.5)
PHOTO IMAGE: NORMAL
PLATELET # BLD AUTO: 220 10E3/UL (ref 150–450)
POTASSIUM SERPL-SCNC: 3.8 MMOL/L (ref 3.4–5.3)
SODIUM SERPL-SCNC: 139 MMOL/L (ref 135–145)

## 2024-11-07 PROCEDURE — 83735 ASSAY OF MAGNESIUM: CPT | Performed by: COLON & RECTAL SURGERY

## 2024-11-07 PROCEDURE — 84100 ASSAY OF PHOSPHORUS: CPT | Performed by: COLON & RECTAL SURGERY

## 2024-11-07 PROCEDURE — 36415 COLL VENOUS BLD VENIPUNCTURE: CPT | Performed by: COLON & RECTAL SURGERY

## 2024-11-07 PROCEDURE — 250N000013 HC RX MED GY IP 250 OP 250 PS 637: Performed by: COLON & RECTAL SURGERY

## 2024-11-07 PROCEDURE — 80048 BASIC METABOLIC PNL TOTAL CA: CPT | Performed by: COLON & RECTAL SURGERY

## 2024-11-07 PROCEDURE — 85049 AUTOMATED PLATELET COUNT: CPT | Performed by: COLON & RECTAL SURGERY

## 2024-11-07 PROCEDURE — 120N000001 HC R&B MED SURG/OB

## 2024-11-07 PROCEDURE — 85018 HEMOGLOBIN: CPT | Performed by: COLON & RECTAL SURGERY

## 2024-11-07 RX ORDER — METHOCARBAMOL 500 MG/1
500 TABLET, FILM COATED ORAL 4 TIMES DAILY PRN
Status: DISCONTINUED | OUTPATIENT
Start: 2024-11-07 | End: 2024-11-09 | Stop reason: HOSPADM

## 2024-11-07 RX ORDER — LIDOCAINE 4 G/G
2 PATCH TOPICAL
Status: DISCONTINUED | OUTPATIENT
Start: 2024-11-07 | End: 2024-11-09 | Stop reason: HOSPADM

## 2024-11-07 RX ADMIN — OXYCODONE HYDROCHLORIDE 10 MG: 5 TABLET ORAL at 13:41

## 2024-11-07 RX ADMIN — ACETAMINOPHEN 975 MG: 325 TABLET, FILM COATED ORAL at 01:30

## 2024-11-07 RX ADMIN — BUPROPION HYDROCHLORIDE 300 MG: 150 TABLET, EXTENDED RELEASE ORAL at 09:05

## 2024-11-07 RX ADMIN — ACETAMINOPHEN 975 MG: 325 TABLET, FILM COATED ORAL at 17:23

## 2024-11-07 RX ADMIN — OXYCODONE HYDROCHLORIDE 5 MG: 5 TABLET ORAL at 09:06

## 2024-11-07 RX ADMIN — TAMSULOSIN HYDROCHLORIDE 0.4 MG: 0.4 CAPSULE ORAL at 09:05

## 2024-11-07 RX ADMIN — ACETAMINOPHEN 975 MG: 325 TABLET, FILM COATED ORAL at 10:27

## 2024-11-07 RX ADMIN — LIDOCAINE 2 PATCH: 4 PATCH TOPICAL at 10:32

## 2024-11-07 RX ADMIN — METHOCARBAMOL 500 MG: 500 TABLET ORAL at 10:27

## 2024-11-07 NOTE — PLAN OF CARE
Goal Outcome Evaluation:      Plan of Care Reviewed With: patient    Overall Patient Progress: improvingOverall Patient Progress: improving    Date & Time: 11/7/24 4458-6122  Behavior & Aggression: green  Fall Risk: yes  Orientation:A&OX4  ABNL VS/O2:VSS, RA  Pain Management:pain controlled with oxycodone and tylenol  Bowel/Bladder: Voiding well, needs to be reminded to use urinal for I&Os. Positive BS, still no flatus.  No nausea.   Wounds/incisions: Inc CDI. Old stoma site dressing changed this afternoon.    Diet: Pt tolerating full liquid diet.   Activity Level: Up with SBA of 1, GBW

## 2024-11-07 NOTE — PROGRESS NOTES
Colon and Rectal Surgery  Daily Progress Note    Subjective  Patient reports overall feeling well, some moderate abdominal pain but controlled on current regimen.  No acute events overnight.  Denies fevers, chills, sweats, nausea, vomiting.  Has had sips of liquids with some nausea.      Objective  Intake/Output last 24 hrs:  Temp:  [96.7  F (35.9  C)-98.2  F (36.8  C)] 98.1  F (36.7  C)  Pulse:  [61-85] 61  Resp:  [12-23] 18  BP: (118-150)/(53-81) 120/61  SpO2:  [94 %-99 %] 95 %  Date 11/07/24 0700 - 11/08/24 0659   Shift 7464-5186 3462-5102 3986-4008 24 Hour Total   INTAKE   Shift Total(mL/kg)       OUTPUT   Urine 350   350   Shift Total(mL/kg) 350(3.57)   350(3.57)   Weight (kg) 98.07 98.07 98.07 98.07          Physical Exam:  General: awake, alert, in no acute distress  Respiratory: non-labored breathing  Abdomen: soft, appropriately tender, non-distended              Incisions: clean, dry and intact, some strikethrough around ostomy dressing    Pertinent Labs  Lab Results: personally reviewed.  Lab Results   Component Value Date     11/07/2024     11/06/2024     05/06/2024     10/17/2019     09/14/2010     09/13/2010    CO2 24 11/07/2024    CO2 22 11/06/2024    CO2 23 05/06/2024    CO2 26 10/17/2019    CO2 27 09/14/2010    CO2 25 09/13/2010    BUN 14.1 11/07/2024    BUN 14.8 11/06/2024    BUN 18.2 05/06/2024    BUN 17 10/17/2019    BUN 16 09/14/2010    BUN 14 09/13/2010     Lab Results   Component Value Date    WBC 7.0 11/06/2024    WBC 13.3 05/05/2024    WBC 14.3 05/03/2024    WBC 6.6 10/17/2019    WBC 8.9 09/14/2010    WBC 9.8 09/13/2010    HGB 14.1 11/07/2024    HGB 15.2 11/06/2024    HGB 16.2 11/06/2024    HGB 17.2 10/17/2019    HGB 16.6 09/14/2010    HGB 16.3 09/13/2010    HCT 47.6 11/06/2024    HCT 27.7 05/05/2024    HCT 28.1 05/03/2024    HCT 49.0 10/17/2019    HCT 48.9 09/14/2010    HCT 48.2 09/13/2010    MCV 91 11/06/2024    MCV 88 05/05/2024    MCV 87 05/03/2024     MCV 94 10/17/2019    MCV 96 09/14/2010    MCV 97 09/13/2010     11/07/2024     11/06/2024     05/06/2024     10/17/2019     09/14/2010     09/13/2010       Assessment/Plan: This is a 69 year old male POD #1 s/p robot-assisted colostomy reversal.  Overall progressing well.  H/H stable    - Continue FLD  - Remove austin  - continue mivf   - Ambulate  - holding DVT ppx for now  - await return of bowel function    Discussed with Dr. Anthony Whaley MD on 11/7/2024 at 9:13 AM   Colon and rectal surgery fellow

## 2024-11-07 NOTE — PLAN OF CARE
Goal Outcome Evaluation:      Plan of Care Reviewed With: patient    Overall Patient Progress: improvingOverall Patient Progress: improving         Date & Time: 11p-7a   11/7/2024  Surgery/POD#: 1 ROBOTIC ASSISTED COLOSTOMY CLOSURE  CYSTOSCOPY, WITH URETERAL OPEN ENDED CATHETER INSERTION - BILATERAL       Behavior & Aggression: Green  Fall Risk: Yes  Orientation:AOx4  ABNL VS/O2:VSS on RA  ABNL Labs: See labs  Pain Management: Tylenol, reposition   Bowel/Bladder: No BM this shift. -gas, +BS. Zamora in place with good OP.   Drains: PIVx2 SL  Wounds/incision: end colostomy site with packing. Port sitesx4  Diet:full liquid  Activity Level: Ax1, SBA. Not OOB this shift  Tests/Procedures: N/A  Anticipated  DC Date: Pending   Significant Information: c/c hip pain and abd pain.

## 2024-11-07 NOTE — PROGRESS NOTES
Pt arrived to unit from PACU around 1630. A&Ox4. VSS on 2-3L O2.  Complained of abdominal pain and Right hip pain. IV dilaudid and repositioning for pain relief. Abdominal dressing with some shadowing of drainage. Three lap sites WNL. Zamora catheter with watermelon colored urine. LR running at 75ml/hr. Patient stood at edge of bed. Up with one and gait belt.

## 2024-11-07 NOTE — ANESTHESIA POSTPROCEDURE EVALUATION
Patient: Castillo Alba    Procedure: Procedure(s):  ROBOTIC ASSISTED COLOSTOMY CLOSURE  CYSTOSCOPY, WITH URETERAL OPEN ENDED CATHETER INSERTION - BILATERAL       Anesthesia Type:  General    Note:  Disposition: Inpatient   Postop Pain Control: Uneventful            Sign Out: Well controlled pain   PONV: No   Neuro/Psych: Uneventful            Sign Out: Acceptable/Baseline neuro status   Airway/Respiratory: Uneventful            Sign Out: Acceptable/Baseline resp. status   CV/Hemodynamics: Uneventful            Sign Out: Acceptable CV status; No obvious hypovolemia; No obvious fluid overload   Other NRE: NONE   DID A NON-ROUTINE EVENT OCCUR? No           Last vitals:  Vitals Value Taken Time   /76 11/06/24 1600   Temp 36  C (96.8  F) 11/06/24 1500   Pulse 82 11/06/24 1614   Resp 15 11/06/24 1614   SpO2 97 % 11/06/24 1616   Vitals shown include unfiled device data.    Electronically Signed By: Gino Olivier DO  November 6, 2024  6:21 PM

## 2024-11-07 NOTE — CONSULTS
"CLINICAL NUTRITION SERVICES  -  ASSESSMENT NOTE    Recommendations Ordered by Registered Dietitian (RD):   - Recommended Energy and Protein goal as outlined below.   - Small/frequent meals.   Malnutrition:   % Weight Loss:  > 10% in 6 months (severe malnutrition)  % Intake:  Decreased intake does not meet criteria for malnutrition - pt denies more recent issues  Subcutaneous Fat Loss:  None observed  Muscle Loss:  Clavicle bone region mild depletion, Acromion bone region mild depletion, and Dorsal hand region mild depletion  Fluid Retention:  None noted    Malnutrition Diagnosis: Moderate malnutrition  In Context of:  Chronic illness or disease     REASON FOR ASSESSMENT  Castillo Alba is a 69 year old male seen by Registered Dietitian for Nutrition Admission Risk Screen Received -   Have you recently lost weight without trying ? - \"unsure\"  Have you been eating poorly due to a decreased appetite ?- \"no\"    NUTRITION HISTORY  - Information obtained from patient report.   - Pt reports weight loss began last November, starting with an infected tooth and altered Gi function.   - In April of this year he was admitted from 4/24 - 5/7, during which he was NPO for several days before starting TPN. Pt discharged to home on a low fiber diet.   - Pt and family endorse significant weight loss during the April admission.   - Since then, he has been trying to eat normally and thinks he put some weight back on.     CURRENT NUTRITION ORDERS  Diet Order:     Full Liquid diet    Current Intake/Tolerance:  Tolerating ice cream, sherbet.     NUTRITION FOCUSED PHYSICAL ASSESSMENT FOR DIAGNOSING MALNUTRITION)  Yes    Observed:    Muscle wasting (refer to documentation in Malnutrition section)    Obtained from Chart/Interdisciplinary Team:  11/6 - Colostomy closure     ANTHROPOMETRICS  Height: 6' 0\"   Weight: 216 lbs 3.2 oz (98.1)   Body mass index is 29.32 kg/m .   Weight Status:  Overweight BMI 25-29.9   IBW: 80.9 kg   % IBW: 121% "   Weight History: 10% wt loss since early May - 6 months.   Wt Readings from Last 10 Encounters:   11/06/24 98.1 kg (216 lb 3.2 oz)   05/07/24 109.3 kg (241 lb)   04/17/24 109.8 kg (242 lb)   04/16/24 111.1 kg (245 lb)   02/18/24 117.9 kg (260 lb)   10/17/19 104.3 kg (230 lb)       LABS  Labs reviewed    MEDICATIONS  Medications reviewed    ASSESSED NUTRITION NEEDS PER APPROVED PRACTICE GUIDELINES:  Dosing weight: 85 kg - adjusted   Estimated Energy Needs: 5609-1509 kcals (25-30 Kcal/Kg)   Justification: maintenance / repletion   Estimated Protein Needs: 100-130 grams protein (1.2-1.5 g pro/Kg)   Justification: post-op     MALNUTRITION:  % Weight Loss:  > 10% in 6 months (severe malnutrition)  % Intake:  Decreased intake does not meet criteria for malnutrition - pt denies more recent issues  Subcutaneous Fat Loss:  None observed  Muscle Loss:  Clavicle bone region mild depletion, Acromion bone region mild depletion, and Dorsal hand region mild depletion  Fluid Retention:  None noted    Malnutrition Diagnosis: Moderate malnutrition  In Context of:  Chronic illness or disease    NUTRITION DIAGNOSIS:  Inadequate oral intake related to recent surgery as evidenced by minimal PO intake since surgery yesterday.     NUTRITION INTERVENTIONS  Recommendations / Nutrition Prescription  ADAT    Implementation  Nutrition education: Provided education on adequate calorie and protein intake for recovery.     Nutrition Goals  Diet >liquids in the next 48 hours.     MONITORING AND EVALUATION:  Progress towards goals will be monitored and evaluated per protocol and Practice Guidelines    Razia Gore RD, LD  Pager: [phone]  Available on YouView

## 2024-11-08 ENCOUNTER — APPOINTMENT (OUTPATIENT)
Dept: PHYSICAL THERAPY | Facility: CLINIC | Age: 69
DRG: 330 | End: 2024-11-08
Attending: COLON & RECTAL SURGERY
Payer: COMMERCIAL

## 2024-11-08 LAB
GLUCOSE SERPL-MCNC: 110 MG/DL (ref 70–99)
HGB BLD-MCNC: 13.6 G/DL (ref 13.3–17.7)

## 2024-11-08 PROCEDURE — 120N000001 HC R&B MED SURG/OB

## 2024-11-08 PROCEDURE — 36415 COLL VENOUS BLD VENIPUNCTURE: CPT | Performed by: COLON & RECTAL SURGERY

## 2024-11-08 PROCEDURE — 82947 ASSAY GLUCOSE BLOOD QUANT: CPT | Performed by: COLON & RECTAL SURGERY

## 2024-11-08 PROCEDURE — 250N000013 HC RX MED GY IP 250 OP 250 PS 637: Performed by: COLON & RECTAL SURGERY

## 2024-11-08 PROCEDURE — 97161 PT EVAL LOW COMPLEX 20 MIN: CPT | Mod: GP

## 2024-11-08 PROCEDURE — 85018 HEMOGLOBIN: CPT | Performed by: COLON & RECTAL SURGERY

## 2024-11-08 RX ORDER — METHOCARBAMOL 500 MG/1
500 TABLET, FILM COATED ORAL 4 TIMES DAILY PRN
Qty: 30 TABLET | Refills: 0 | Status: SHIPPED | OUTPATIENT
Start: 2024-11-08

## 2024-11-08 RX ORDER — OXYCODONE HYDROCHLORIDE 5 MG/1
5-10 TABLET ORAL EVERY 4 HOURS PRN
Qty: 28 TABLET | Refills: 0 | Status: SHIPPED | OUTPATIENT
Start: 2024-11-08

## 2024-11-08 RX ADMIN — ACETAMINOPHEN 975 MG: 325 TABLET, FILM COATED ORAL at 01:37

## 2024-11-08 RX ADMIN — LIDOCAINE 2 PATCH: 4 PATCH TOPICAL at 08:06

## 2024-11-08 RX ADMIN — ACETAMINOPHEN 975 MG: 325 TABLET, FILM COATED ORAL at 17:47

## 2024-11-08 RX ADMIN — ACETAMINOPHEN 975 MG: 325 TABLET, FILM COATED ORAL at 11:00

## 2024-11-08 RX ADMIN — OXYCODONE HYDROCHLORIDE 5 MG: 5 TABLET ORAL at 17:47

## 2024-11-08 RX ADMIN — TAMSULOSIN HYDROCHLORIDE 0.4 MG: 0.4 CAPSULE ORAL at 08:06

## 2024-11-08 RX ADMIN — OXYCODONE HYDROCHLORIDE 5 MG: 5 TABLET ORAL at 01:42

## 2024-11-08 RX ADMIN — BUPROPION HYDROCHLORIDE 300 MG: 150 TABLET, EXTENDED RELEASE ORAL at 08:06

## 2024-11-08 RX ADMIN — OXYCODONE HYDROCHLORIDE 5 MG: 5 TABLET ORAL at 22:21

## 2024-11-08 RX ADMIN — METHOCARBAMOL 500 MG: 500 TABLET ORAL at 01:37

## 2024-11-08 RX ADMIN — OXYCODONE HYDROCHLORIDE 5 MG: 5 TABLET ORAL at 11:00

## 2024-11-08 NOTE — CONSULTS
Confirmed with nursing, no housing concerns     No further care management intervention anticipated at this time.  Please re-consult if further needs arise.  Care management signing off.       Margaret Meier RN   [facility]   Phone [phone], Vocera or [phone]

## 2024-11-08 NOTE — PROGRESS NOTES
11/08/24 1400   Appointment Info   Signing Clinician's Name / Credentials (PT) Jaja Obregon PT   Living Environment   People in Home spouse;child(eleno), adult   Current Living Arrangements house   Home Accessibility stairs to enter home   Number of Stairs, Main Entrance 2   Stair Railings, Main Entrance railings safe and in good condition;railing on right side (ascending)   Transportation Anticipated car, drives self;family or friend will provide   Self-Care   Usual Activity Tolerance good   Current Activity Tolerance moderate   Regular Exercise No   Equipment Currently Used at Home none   Fall history within last six months no   Activity/Exercise/Self-Care Comment Has access to FWW from last hospitalization   General Information   Onset of Illness/Injury or Date of Surgery 11/06/24   Referring Physician Dr. Cruz   Patient/Family Therapy Goals Statement (PT) To go home   Pertinent History of Current Problem (include personal factors and/or comorbidities that impact the POC) Pt is a 69 year old male admitted for colostomy closure.   Cognition   Affect/Mental Status (Cognition) WFL   Orientation Status (Cognition) oriented x 4   Follows Commands (Cognition) WFL   Pain Assessment   Patient Currently in Pain No   Range of Motion (ROM)   Range of Motion ROM is WFL   Strength (Manual Muscle Testing)   Strength (Manual Muscle Testing) strength is WFL   Bed Mobility   Bed Mobility no deficits identified   Transfers   Transfers no deficits identified   Gait/Stairs (Locomotion)   [place] Level (Gait) modified independence   Assistive Device (Gait) walker, front-wheeled   Distance in Feet (Gait) 300   Comment, (Gait/Stairs) Pt demonstrates ability to ambulates in hallway with FWW >300' modified independent. Pt with good posture and gait speed, able to start/stop, change speeds and change directions without difficulty. Pt up/down 3 steps with handrail modified independent.   Balance   Balance Comments Good   Clinical  Impression   Criteria for Skilled Therapeutic Intervention Evaluation only   Clinical Presentation (PT Evaluation Complexity) stable   Clinical Presentation Rationale VSS, pain controlled   Clinical Decision Making (Complexity) low complexity   Risk & Benefits of therapy have been explained evaluation/treatment results reviewed;care plan/treatment goals reviewed;risks/benefits reviewed;current/potential barriers reviewed;participants voiced agreement with care plan;participants included;patient   PT Total Evaluation Time   PT Eval, Low Complexity Minutes ([id number]) 25   PT Discharge Planning   PT Plan Pt modified independent with mobility, no acute care PT needs, will complete orders   PT Discharge Recommendation (DC Rec) home with assist   PT Rationale for DC Rec At baseline, pt lives with his wife and two adult children in a house with 2 steps to enter and all needs met on main floor. This date, pt is independent with bed mobility and transfers, modified independent for ambulation with FWW and stair management. Pt is safe to discharge home with family providing any assistance for community errands.   PT Brief overview of current status Goals of therapy will be to address safe mobility and make recs for d/c to next level of care. Pt and RN will continue to follow all falls risk precautions as documented by RN staff while hospitalized. Independent to modified independent with FWW.   Physical Therapy Time and Intention   Total Session Time (sum of timed and untimed services) 25

## 2024-11-08 NOTE — PROGRESS NOTES
COLON & RECTAL SURGERY  PROGRESS NOTE    November 8, 2024  Post-op Day # 2    SUBJECTIVE:  Pain controlled. +Flatus. Tolerating fulls. Voiding post austin removal.    OBJECTIVE:  Temp:  [97.8  F (36.6  C)-98.8  F (37.1  C)] 98.8  F (37.1  C)  Pulse:  [61-70] 65  Resp:  [16-18] 18  BP: (110-120)/(61-65) 120/62  SpO2:  [94 %-97 %] 97 %    Intake/Output Summary (Last 24 hours) at 11/8/2024 0629  Last data filed at 11/8/2024 0626  Gross per 24 hour   Intake 910 ml   Output 1180 ml   Net -270 ml       GENERAL:  Awake, alert, no acute distress  EXTREMITIES: Warm and well perfused, no edema   ABDOMEN:  Soft, appropriately tender, non-distended. No guarding, rigidity, or peritoneal signs.  INCISION:  C/d/i    LABS:  Lab Results   Component Value Date    WBC 7.0 11/06/2024    WBC 6.6 10/17/2019     Lab Results   Component Value Date    HGB 14.1 11/07/2024    HGB 17.2 10/17/2019     Lab Results   Component Value Date    HCT 47.6 11/06/2024    HCT 49.0 10/17/2019     Lab Results   Component Value Date     11/07/2024     10/17/2019     Last Basic Metabolic Panel:  Lab Results   Component Value Date     11/07/2024     10/17/2019      Lab Results   Component Value Date    POTASSIUM 3.8 11/07/2024    POTASSIUM 3.8 10/17/2019     Lab Results   Component Value Date    CHLORIDE 105 11/07/2024    CHLORIDE 106 10/17/2019     Lab Results   Component Value Date    LENORE 8.9 11/07/2024    LENORE 9.3 10/17/2019     Lab Results   Component Value Date    CO2 24 11/07/2024    CO2 26 10/17/2019     Lab Results   Component Value Date    BUN 14.1 11/07/2024    BUN 17 10/17/2019     Lab Results   Component Value Date    CR 0.82 11/07/2024    CR 0.84 10/17/2019     Lab Results   Component Value Date    GLC 94 11/07/2024    GLC 97 05/06/2024     10/17/2019       ASSESSMENT/PLAN: Castillo Alba is a 69 year old male POD # 2 s/p robotic assisted colostomy closure.     Regular diet. Encourage fluid intake.  Multimodal  pain control with Tylenol, oxycodone and Robaxin.  Can resume home Celebrex on Monday.  Will send home with prescriptions for oxycodone and Robaxin.  Encourage ambulation.  Will have PT assessed today given hip pain.  Continue Flomax while inpatient.  Does not need at discharge.  SCDs and encourage ambulation for DVT prophylaxis.  Check a.m. hemoglobin.  If progresses well today, likely discharge to home this evening versus over the weekend.    Clinically Significant Risk Factors                              # Overweight: Estimated body mass index is 29.32 kg/m  as calculated from the following:    Height as of this encounter: 6'.    Weight as of this encounter: 216 lb 3.2 oz., PRESENT ON ADMISSION  # Moderate Malnutrition: based on nutrition assessment, PRESENT ON ADMISSION   # Financial/Environmental Concerns:           For questions/paging, please contact the CRS office at [phone].    Alejandra Cruz MD  Colorectal Surgery    [facility]  [address] [address]  T: [phone]  F: [phone]

## 2024-11-08 NOTE — PLAN OF CARE
Goal Outcome Evaluation:      Plan of Care Reviewed With: patient    Overall Patient Progress: improvingOverall Patient Progress: improving         Date & Time: 11p-7a   11/8/2024  Surgery/POD#: 2 ROBOTIC ASSISTED COLOSTOMY CLOSURE  CYSTOSCOPY, WITH URETERAL OPEN ENDED CATHETER INSERTION - BILATERAL       Behavior & Aggression: Green  Fall Risk: Yes  Orientation:AOx4  ABNL VS/O2:VSS on RA  ABNL Labs: See labs  Pain Management: Tylenol, reposition, heat packs, oxycodonex1  Bowel/Bladder: No BM this shift. +gas, +BS. Voiding in urinal   Drains: PIVx2 SL  Wounds/incision: end colostomy site with packing. Port sitesx4, CDI  Diet: full liquid  Activity Level: Ax1, SBA. Not OOB this shift  Tests/Procedures: N/A  Anticipated  DC Date: Pending on ROBF  Significant Information: c/c abd pain @ [facility] and R hip

## 2024-11-08 NOTE — PLAN OF CARE
Goal Outcome Evaluation:      Plan of Care Reviewed With: patient, child    Date & Time: 11/8/24 1700  Surgery/POD#: POD 2 colostomy closure, cystoscopy with ureteral cath insertion   Behavior & Aggression: calm cooperative   Fall Risk: yes  Orientation:alert and oriented   ABNL VS/O2:lungs clear, O2 sats mid 90's room air, vss  ABNL Labs:   Pain Management:oxycodone as needed for pain   Bowel/Bladder: good urine output, passing flatus, no BM this shift   Drains:   Wounds/incisions old ostomy site dressing changed, pt was to discharge today so packing was removed and clean dressing placed   Diet:regular diet   Activity Level: up with stand by assist with walker   Tests/Procedures:   Anticipated  DC Date: 11/9  Significant Information: pt was cleared to discharge. Pt stated he did not feel ready for discharge, MD notified and order was canceled

## 2024-11-08 NOTE — PROVIDER NOTIFICATION
Sent message through [facility] to Danielle ORENLAS, pt wanted to discharge but now does not feel ready for discharge. Wants to stay one more day. Awaiting call back

## 2024-11-09 VITALS
SYSTOLIC BLOOD PRESSURE: 123 MMHG | OXYGEN SATURATION: 96 % | WEIGHT: 216.2 LBS | TEMPERATURE: 97.8 F | DIASTOLIC BLOOD PRESSURE: 64 MMHG | RESPIRATION RATE: 16 BRPM | HEART RATE: 64 BPM | HEIGHT: 72 IN | BODY MASS INDEX: 29.28 KG/M2

## 2024-11-09 LAB — PLATELET # BLD AUTO: 196 10E3/UL (ref 150–450)

## 2024-11-09 PROCEDURE — 36415 COLL VENOUS BLD VENIPUNCTURE: CPT | Performed by: COLON & RECTAL SURGERY

## 2024-11-09 PROCEDURE — 85049 AUTOMATED PLATELET COUNT: CPT | Performed by: COLON & RECTAL SURGERY

## 2024-11-09 PROCEDURE — 250N000013 HC RX MED GY IP 250 OP 250 PS 637: Performed by: COLON & RECTAL SURGERY

## 2024-11-09 RX ADMIN — TAMSULOSIN HYDROCHLORIDE 0.4 MG: 0.4 CAPSULE ORAL at 09:25

## 2024-11-09 RX ADMIN — BUPROPION HYDROCHLORIDE 300 MG: 150 TABLET, EXTENDED RELEASE ORAL at 09:25

## 2024-11-09 RX ADMIN — METHOCARBAMOL 500 MG: 500 TABLET ORAL at 01:35

## 2024-11-09 RX ADMIN — LIDOCAINE 2 PATCH: 4 PATCH TOPICAL at 09:24

## 2024-11-09 RX ADMIN — ACETAMINOPHEN 975 MG: 325 TABLET, FILM COATED ORAL at 09:25

## 2024-11-09 ASSESSMENT — ACTIVITIES OF DAILY LIVING (ADL)
ADLS_ACUITY_SCORE: 0

## 2024-11-09 NOTE — PLAN OF CARE
Goal Outcome Evaluation:       Date & Time: 11/09/2024  Surgery/POD#: POD #3  Behavior & Aggression: Calm and cooperative   Fall Risk: Yes  Orientation: AOx4  ABNL VS/O2:VSS, RA  ABNL Labs: NA  Pain Management: Scheduled tylenol, PRN oxy for abd related pain   Bowel/Bladder: No BM, passing gas, voiding   Drains: NA  Wounds/incisions: Old ostomy site   Diet:Reg  Activity Level: Ass. 1 w/ gait belt and walker   Tests/Procedures: NA  Anticipated  DC Date: 11/09 to home  Significant Information:

## 2024-11-09 NOTE — OP NOTE
OPERATIVE REPORT  DATE OF SURGERY: 11/06/24  LOCATION OF SURGERY: [facility] OR  PREOPERATIVE DIAGNOSIS:  (Z93.3) Colostomy in place (H)  (primary encounter diagnosis)  POSTOPERATIVE DIAGNOSIS: (Z93.3) Colostomy in place (H)  (primary encounter diagnosis)     START TIME: 8:21 AM   END TIME: 8:29 AM (Urology portion)    PROCEDURE PERFORMED:   Cystoscopy and bilateral ureteral catheter placement     STAFF SURGEON: Hermann Mayberry MD  ANESTHESIA: General.   ESTIMATED BLOOD LOSS: 0 mL (Urology portion)   DRAINS AND TUBES: Bilateral 5fr open-ended ureteral catheters, 16fr coude catheter  COMPLICATIONS: None.   DISPOSITION: PACU.   SPECIMENS OBTAINED: None (Urology potion)  SIGNIFICANT FINDINGS: Evidence of BPH with moderate tri-lobar hypertrophy. Bilateral ureteral catheters placed without complications.     HISTORY OF PRESENT ILLNESS: Jim Alba is a 69 year old man who presents for colostomy closure with a complex colorectal history with Dr. Cruz. Bilateral ureteral catheters requested for ureteral identification.      OPERATION PERFORMED:   Informed consent was obtained and the patient was brought to the operating room where general anesthesia was induced. The patient was given appropriate preoperative antibiotics and positioned supine. The patient was then repositioned in dorsal lithotomy with all pressure points padded. We then performed a timeout, verifying the correct patient's site and procedure to be performed.    A 22 [place] cystoscope was inserted atraumatically into the bladder.  He was noted to have moderate trilobar prostatic hypertrophy and mild to moderate trabeculation.  No other intravesical abnormalities noted.  Bilateral ureteral orifices were identified and the right ureteral orifice was cannulated with a 0.035 sensor wire and a 5 [place] open-ended catheter which was advanced to the 25 cm vladimir and the wire was removed.  Similarly, the left ureteral orifice was cannulated with the 0.035 sensor  wire and a 5 [place] open-ended catheter which was advanced up to the 25 cm vladimir and the wire was removed.  The cystoscope was removed keeping the ureteral catheters in place.  A 16 [place] coudé catheter was placed with 10 cc sterile water instilled into the balloon.  The ureteral catheters and Zamora catheter were then connected via a Goldberg adapter and further secured with an 0 Vicryl tie.  The case was turned over to Dr. Cruz and her team with plans to remove the ureteral catheters at the completion of the case.  Please see separate dictation.    Hermann Mayberry MD   Urology  [facility]  Clinic Phone [phone]

## 2024-11-09 NOTE — DISCHARGE SUMMARY
Discharge    Patient discharged to Home via Car with Son  Care plan note Met    Listed belongings gathered and given to patient (including from security/pharmacy). Yes  Care Plan and Patient education resolved: Yes  Prescriptions if needed, hard copies sent with patient  NA  Medication Bin checked and emptied on discharge Yes  SW/care coordinator/charge RN aware of discharge: Yes    Discussed wound care and bandage changes with Patient and son, they verbally repeated back. Patient was taken down on a wheelchair to door 6 with transport.

## 2024-11-09 NOTE — PLAN OF CARE
Goal Outcome Evaluation:  Date & Time: 11/8/2024 [id number]  Surgery/POD#: 2 ROBOTIC ASSISTED COLOSTOMY CLOSURE  CYSTOSCOPY, WITH URETERAL OPEN ENDED CATHETER INSERTION - BILATERAL       Behavior & Aggression: Green  Fall Risk: Yes  Orientation:AOx4  ABNL VS/O2:VSS on RA  ABNL Labs: See labs  Pain Management: Tylenol, reposition, oxycodonex1, reassess at 2315  Bowel/Bladder: No BM this shift. -gas, +BS. Voiding in urinal but very concentrated. Encourage po intake, fluids  Drains: No access, was to discharge today  Wounds/incision: end colostomy site w gauze. Port sites x4 glued and intact  Diet: regular  Activity Level: Not OOB this shift 4h shift. Normally uses walker w SBA per report and patient  Tests/Procedures: N/A  Anticipated  DC Date: discharge home with son in the morning  Significant Information: encourage PO

## 2024-11-09 NOTE — PROGRESS NOTES
Colon and Rectal Surgery Progress Note          Assessment and Plan:     S/p colostomy reversal    Doing well.  OK to discharge to home      Julio César Joya MD FACS FASCRS  Colorectal Surgeon       [facility]  [address]  [place] [address]  T: [phone]  F: [phone]  Pager: [phone]  [email or web address]                 Interval History:     Passing gas.  No bm.  Tolerating diet              Physical Exam:   Vitals were reviewed  Patient Vitals for the past 24 hrs:   BP Temp Temp src Pulse Resp SpO2   11/09/24 0727 123/64 97.8  F (36.6  C) Oral 64 16 96 %   11/08/24 2352 106/58 97.9  F (36.6  C) Oral 65 18 95 %   11/08/24 2307 -- -- -- -- 16 --   11/08/24 2221 -- -- -- -- 16 --   11/08/24 1504 116/64 98.1  F (36.7  C) Oral 59 16 94 %         Intake/Output Summary (Last 24 hours) at 11/9/2024 0935  Last data filed at 11/9/2024 0300  Gross per 24 hour   Intake 1200 ml   Output 550 ml   Net 650 ml       Head - Nomocephalic atraumatic  Sclera anicteric  Extremities warm and well perfused  Lungs - breathing non labored,   Abdomen - wounds look good, wound dressed  Rectal exam - deferred  Skin - no rash  Psych - affect appropriate  Neuro - no focal deficits             Data:   All laboratory and imaging data in the past 24 hours reviewed    CBC  Lab Results   Component Value Date    WBC 7.0 11/06/2024    WBC 13.3 (H) 05/05/2024    WBC 14.3 (H) 05/03/2024    HGB 13.6 11/08/2024    HGB 14.1 11/07/2024    HGB 15.2 11/06/2024    HCT 47.6 11/06/2024    HCT 27.7 (L) 05/05/2024    HCT 28.1 (L) 05/03/2024     11/09/2024     11/07/2024     11/06/2024       BMP  Recent Labs   Lab Test 11/08/24  0709 11/07/24  0543 11/06/24  0647   NA  --  139 136   POTASSIUM  --  3.8 4.2   CHLORIDE  --  105 101   CO2  --  24 22   ANIONGAP  --  10 13   * 94 123*   BUN  --  14.1 14.8   CR  --  0.82 0.78   LENORE  --  8.9 9.4       Liver Function Studies -   Recent Labs   Lab Test  05/06/24  0623   PROTTOTAL 6.5   ALBUMIN 2.6*   BILITOTAL 0.8   ALKPHOS 201*   AST 22   ALT 17       New imaging data reviewed: no    Total time spent in counseling, direct patient care, coordination of care, and review of data 15 min    Clinically Significant Risk Factors                              # Overweight: Estimated body mass index is 29.32 kg/m  as calculated from the following:    Height as of this encounter: 1.829 m (6').    Weight as of this encounter: 98.1 kg (216 lb 3.2 oz)., PRESENT ON ADMISSION  # Moderate Malnutrition: based on nutrition assessment, PRESENT ON ADMISSION   # Financial/Environmental Concerns:

## 2024-11-12 NOTE — DISCHARGE SUMMARY
[facility] Discharge Summary      Castillo Alba MRN# [id number]   Age: 69 year old YOB: 1955     Date of Admission:  11/6/2024  Date of Discharge::  11/9/2024 12:05 PM  Admitting Physician:  Alejandra Cruz MD  Discharge Physician:  Alejandra Cruz MD     PCP:  Martín Morley    Disposition: Patient discharged from [facility] to home in stable condition.        Primary Diagnosis:   Garcia's procedure with end colostomy, undesired colostomy            Discharge Medications:     Discharge Medication List as of 11/9/2024 11:26 AM        START taking these medications    Details   methocarbamol (ROBAXIN) 500 MG tablet Take 1 tablet (500 mg) by mouth 4 times daily as needed for muscle spasms., Disp-30 tablet, R-0, E-Prescribe      oxyCODONE (ROXICODONE) 5 MG tablet Take 1-2 tablets (5-10 mg) by mouth every 4 hours as needed for moderate pain., Disp-28 tablet, R-0, E-Prescribe           CONTINUE these medications which have NOT CHANGED    Details   buPROPion (WELLBUTRIN XL) 300 MG 24 hr tablet Take 300 mg by mouth every morning., Historical           STOP taking these medications       metroNIDAZOLE (FLAGYL) 500 MG tablet Comments:   Reason for Stopping:         neomycin (MYCIFRADIN) 500 MG tablet Comments:   Reason for Stopping:         ondansetron (ZOFRAN ODT) 4 MG ODT tab Comments:   Reason for Stopping:                      Follow Up, Special Instructions:     Discharge diet: Low fiber diet. Continue for one week at discharge.   Discharge activity: No heavy lifting, pushing, pulling for 6 week(s)   Discharge follow-up: Follow up with Dr. Cruz at your scheduled post-op visit in 4 weeks    Wound care: May get incision wet in shower but do not soak or scrub              Procedures:     Procedure(s): 1. Robotic colostomy closure with anastomosis at 12 cm  2. Extensive lysis of adhesions lasting 180 minutes  3. Intra-operative fluorescence angiography with  ICG    4. Rigid proctoscopy with anastomotic leak testing  5. Parastomal hernia repair   6. Cystoscopy and bilateral ureteral stent placement (Dr. Mayberry)       -            Consultations:   None          Brief Hospital Summary:     Patient is a 69 year old male who underwent robotic colostomy closure on 11/6 by Dr. Cruz.   There were no immediate complications during this procedure.    Please refer to the full operative summary for details.  The patient's hospital course was unremarkable.  Pain was controlled on oral pain regimen.  He was tolerating a low fiber diet.  Bowel function had returned prior to discharge.  He recovered as anticipated and experienced no post-operative complications.         Attestation:  I have reviewed today's vital signs, notes, medications, labs and imaging.    Danielle Ramos PA-C  Colorectal Physician Assistant    [facility]  [address] [facility] [address]  [place] [address]  T: [phone]  F: [phone]         ADDENDUM:  Length of stay: 4 days  Indicate Y or N for the following:  UTI  No  C diff  No  PNA  No  SSI No  DVT No  PE  No  CVA No  MI No  Enterocutaneous fistula  No  Peripheral nerve injury  No  Abscess (not adjacent to anastomosis)  No  Leak No    Treated with:   Antibiotics N/A   Drain  N/A   Reoperation  N/A  Death within 30 days No  Reintubation  No  Reoperation  No       Surgical pathology:  A.  Colostomy, takedown:  -- Benign colonic mucosa and submucosa.     B.  Rectal stump, colostomy takedown:  -- Benign rectal mucosa and submucosa.

## 2025-03-26 ENCOUNTER — MEDICAL CORRESPONDENCE (OUTPATIENT)
Dept: HEALTH INFORMATION MANAGEMENT | Facility: CLINIC | Age: 70
End: 2025-03-26

## 2025-04-12 ENCOUNTER — HEALTH MAINTENANCE LETTER (OUTPATIENT)
Age: 70
End: 2025-04-12

## 2025-05-16 NOTE — PROGRESS NOTES
PTA medications updated by Medication Scribe prior to surgery via phone call with patient (last doses completed by Nurse)     Medication history sources: Patient, Surescripts, and H&P  In the past week, patient estimated taking medication this percent of the time: Greater than 90%      Significant changes made to the medication list:  Patient reports no longer taking the following meds (med scribe removed from PTA med list): Wellbutrin, Robaxin, oxycodone      Additional medication history information:   None    Medication reconciliation completed by provider prior to medication history? No    Time spent in this activity: 10 minutes    The information provided in this note is only as accurate as the sources available at the time of update(s)      Prior to Admission medications    Not on File       Medication history completed by: Elvin Olson

## 2025-05-22 ENCOUNTER — APPOINTMENT (OUTPATIENT)
Dept: PHYSICAL THERAPY | Facility: CLINIC | Age: 70
End: 2025-05-22
Attending: ORTHOPAEDIC SURGERY
Payer: COMMERCIAL

## 2025-05-22 ENCOUNTER — ANESTHESIA (OUTPATIENT)
Dept: SURGERY | Facility: CLINIC | Age: 70
End: 2025-05-22
Payer: COMMERCIAL

## 2025-05-22 ENCOUNTER — ANESTHESIA EVENT (OUTPATIENT)
Dept: SURGERY | Facility: CLINIC | Age: 70
End: 2025-05-22
Payer: COMMERCIAL

## 2025-05-22 ENCOUNTER — APPOINTMENT (OUTPATIENT)
Dept: GENERAL RADIOLOGY | Facility: CLINIC | Age: 70
End: 2025-05-22
Attending: ORTHOPAEDIC SURGERY
Payer: COMMERCIAL

## 2025-05-22 ENCOUNTER — HOSPITAL ENCOUNTER (OUTPATIENT)
Facility: CLINIC | Age: 70
End: 2025-05-22
Attending: ORTHOPAEDIC SURGERY | Admitting: ORTHOPAEDIC SURGERY
Payer: COMMERCIAL

## 2025-05-22 VITALS
DIASTOLIC BLOOD PRESSURE: 56 MMHG | OXYGEN SATURATION: 93 % | SYSTOLIC BLOOD PRESSURE: 119 MMHG | HEART RATE: 60 BPM | BODY MASS INDEX: 30.83 KG/M2 | RESPIRATION RATE: 18 BRPM | TEMPERATURE: 99.1 F | HEIGHT: 73 IN | WEIGHT: 232.6 LBS

## 2025-05-22 DIAGNOSIS — Z96.641 STATUS POST TOTAL HIP REPLACEMENT, RIGHT: Primary | ICD-10-CM

## 2025-05-22 LAB
CREAT SERPL-MCNC: 0.85 MG/DL (ref 0.67–1.17)
EGFRCR SERPLBLD CKD-EPI 2021: >90 ML/MIN/1.73M2
GLUCOSE BLDC GLUCOMTR-MCNC: 136 MG/DL (ref 70–99)

## 2025-05-22 PROCEDURE — 370N000017 HC ANESTHESIA TECHNICAL FEE, PER MIN: Performed by: ORTHOPAEDIC SURGERY

## 2025-05-22 PROCEDURE — 999N000065 XR PELVIS AND HIP PORTABLE RIGHT 1 VIEW

## 2025-05-22 PROCEDURE — 97161 PT EVAL LOW COMPLEX 20 MIN: CPT | Mod: GP | Performed by: PHYSICAL THERAPIST

## 2025-05-22 PROCEDURE — 82962 GLUCOSE BLOOD TEST: CPT

## 2025-05-22 PROCEDURE — 258N000003 HC RX IP 258 OP 636: Performed by: ORTHOPAEDIC SURGERY

## 2025-05-22 PROCEDURE — 258N000003 HC RX IP 258 OP 636: Performed by: SURGERY

## 2025-05-22 PROCEDURE — 250N000011 HC RX IP 250 OP 636: Performed by: ORTHOPAEDIC SURGERY

## 2025-05-22 PROCEDURE — C1776 JOINT DEVICE (IMPLANTABLE): HCPCS | Performed by: ORTHOPAEDIC SURGERY

## 2025-05-22 PROCEDURE — 999N000141 HC STATISTIC PRE-PROCEDURE NURSING ASSESSMENT: Performed by: ORTHOPAEDIC SURGERY

## 2025-05-22 PROCEDURE — 360N000085 HC SURGERY LEVEL 5 W/ FLUORO, PER MIN: Performed by: ORTHOPAEDIC SURGERY

## 2025-05-22 PROCEDURE — 97116 GAIT TRAINING THERAPY: CPT | Mod: GP | Performed by: PHYSICAL THERAPIST

## 2025-05-22 PROCEDURE — 99222 1ST HOSP IP/OBS MODERATE 55: CPT | Performed by: PHYSICIAN ASSISTANT

## 2025-05-22 PROCEDURE — 272N000001 HC OR GENERAL SUPPLY STERILE: Performed by: ORTHOPAEDIC SURGERY

## 2025-05-22 PROCEDURE — 250N000011 HC RX IP 250 OP 636: Mod: JZ | Performed by: SURGERY

## 2025-05-22 PROCEDURE — 250N000025 HC SEVOFLURANE, PER MIN: Performed by: ORTHOPAEDIC SURGERY

## 2025-05-22 PROCEDURE — 258N000001 HC RX 258: Performed by: ORTHOPAEDIC SURGERY

## 2025-05-22 PROCEDURE — 710N000009 HC RECOVERY PHASE 1, LEVEL 1, PER MIN: Performed by: ORTHOPAEDIC SURGERY

## 2025-05-22 PROCEDURE — 250N000013 HC RX MED GY IP 250 OP 250 PS 637: Performed by: ORTHOPAEDIC SURGERY

## 2025-05-22 PROCEDURE — 97530 THERAPEUTIC ACTIVITIES: CPT | Mod: GP | Performed by: PHYSICAL THERAPIST

## 2025-05-22 PROCEDURE — 250N000009 HC RX 250: Performed by: ORTHOPAEDIC SURGERY

## 2025-05-22 PROCEDURE — 82565 ASSAY OF CREATININE: CPT | Performed by: ORTHOPAEDIC SURGERY

## 2025-05-22 PROCEDURE — C1713 ANCHOR/SCREW BN/BN,TIS/BN: HCPCS | Performed by: ORTHOPAEDIC SURGERY

## 2025-05-22 PROCEDURE — 258N000003 HC RX IP 258 OP 636: Performed by: NURSE ANESTHETIST, CERTIFIED REGISTERED

## 2025-05-22 PROCEDURE — 999N000179 XR SURGERY CARM FLUORO LESS THAN 5 MIN W STILLS

## 2025-05-22 PROCEDURE — 250N000009 HC RX 250: Performed by: NURSE ANESTHETIST, CERTIFIED REGISTERED

## 2025-05-22 PROCEDURE — 36415 COLL VENOUS BLD VENIPUNCTURE: CPT | Performed by: ORTHOPAEDIC SURGERY

## 2025-05-22 PROCEDURE — 250N000011 HC RX IP 250 OP 636: Performed by: NURSE ANESTHETIST, CERTIFIED REGISTERED

## 2025-05-22 DEVICE — BIOLOX DELTA CERAMIC FEMORAL HEAD +1.5 36MM DIA 12/14 TAPER
Type: IMPLANTABLE DEVICE | Site: HIP | Status: FUNCTIONAL
Brand: BIOLOX DELTA

## 2025-05-22 DEVICE — PINNACLE CANCELLOUS BONE SCREW 6.5MM X 35MM
Type: IMPLANTABLE DEVICE | Site: HIP | Status: FUNCTIONAL
Brand: PINNACLE

## 2025-05-22 DEVICE — ACTIS DUOFIX HIP PROSTHESIS (FEMORAL STEM 12/14 TAPER CEMENTLESS SIZE 6 HIGH COLLAR)  CE
Type: IMPLANTABLE DEVICE | Site: HIP | Status: FUNCTIONAL
Brand: ACTIS

## 2025-05-22 DEVICE — PINNACLE HIP SOLUTIONS ALTRX POLYETHYLENE ACETABULAR LINER NEUTRAL 36MM ID 62MM OD
Type: IMPLANTABLE DEVICE | Site: HIP | Status: FUNCTIONAL
Brand: PINNACLE ALTRX

## 2025-05-22 DEVICE — PINNACLE CANCELLOUS BONE SCREW 6.5MM X 25MM
Type: IMPLANTABLE DEVICE | Site: HIP | Status: FUNCTIONAL
Brand: PINNACLE

## 2025-05-22 DEVICE — PINNACLE POROCOAT ACETABULAR SHELL SECTOR II 62MM OD
Type: IMPLANTABLE DEVICE | Site: HIP | Status: FUNCTIONAL
Brand: PINNACLE POROCOAT

## 2025-05-22 DEVICE — APEX HOLE ELIMINATOR - PS
Type: IMPLANTABLE DEVICE | Site: HIP | Status: FUNCTIONAL
Brand: APEX

## 2025-05-22 RX ORDER — NALOXONE HYDROCHLORIDE 0.4 MG/ML
0.4 INJECTION, SOLUTION INTRAMUSCULAR; INTRAVENOUS; SUBCUTANEOUS
Status: DISCONTINUED | OUTPATIENT
Start: 2025-05-22 | End: 2025-05-23 | Stop reason: HOSPADM

## 2025-05-22 RX ORDER — DIPHENHYDRAMINE HCL 12.5MG/5ML
12.5 LIQUID (ML) ORAL EVERY 6 HOURS PRN
Status: DISCONTINUED | OUTPATIENT
Start: 2025-05-22 | End: 2025-05-23 | Stop reason: HOSPADM

## 2025-05-22 RX ORDER — ONDANSETRON 4 MG/1
4 TABLET, ORALLY DISINTEGRATING ORAL EVERY 6 HOURS PRN
Status: DISCONTINUED | OUTPATIENT
Start: 2025-05-22 | End: 2025-05-23 | Stop reason: HOSPADM

## 2025-05-22 RX ORDER — ONDANSETRON 2 MG/ML
4 INJECTION INTRAMUSCULAR; INTRAVENOUS EVERY 6 HOURS PRN
Status: DISCONTINUED | OUTPATIENT
Start: 2025-05-22 | End: 2025-05-23 | Stop reason: HOSPADM

## 2025-05-22 RX ORDER — TRANEXAMIC ACID 650 MG/1
1950 TABLET ORAL ONCE
Status: COMPLETED | OUTPATIENT
Start: 2025-05-22 | End: 2025-05-22

## 2025-05-22 RX ORDER — PROCHLORPERAZINE MALEATE 5 MG/1
5 TABLET ORAL EVERY 6 HOURS PRN
Status: DISCONTINUED | OUTPATIENT
Start: 2025-05-22 | End: 2025-05-23 | Stop reason: HOSPADM

## 2025-05-22 RX ORDER — PREGABALIN 150 MG/1
150 CAPSULE ORAL ONCE
Status: COMPLETED | OUTPATIENT
Start: 2025-05-22 | End: 2025-05-22

## 2025-05-22 RX ORDER — LABETALOL HYDROCHLORIDE 5 MG/ML
10 INJECTION, SOLUTION INTRAVENOUS
Status: DISCONTINUED | OUTPATIENT
Start: 2025-05-22 | End: 2025-05-22 | Stop reason: HOSPADM

## 2025-05-22 RX ORDER — ACETAMINOPHEN 325 MG/1
975 TABLET ORAL EVERY 8 HOURS
Status: DISCONTINUED | OUTPATIENT
Start: 2025-05-22 | End: 2025-05-23 | Stop reason: HOSPADM

## 2025-05-22 RX ORDER — SENNOSIDES 8.6 MG
325 CAPSULE ORAL DAILY
Qty: 42 TABLET | Refills: 0 | Status: SHIPPED | OUTPATIENT
Start: 2025-05-22 | End: 2025-07-03

## 2025-05-22 RX ORDER — SODIUM CHLORIDE, SODIUM LACTATE, POTASSIUM CHLORIDE, CALCIUM CHLORIDE 600; 310; 30; 20 MG/100ML; MG/100ML; MG/100ML; MG/100ML
INJECTION, SOLUTION INTRAVENOUS CONTINUOUS
Status: DISCONTINUED | OUTPATIENT
Start: 2025-05-22 | End: 2025-05-22 | Stop reason: HOSPADM

## 2025-05-22 RX ORDER — ONDANSETRON 2 MG/ML
INJECTION INTRAMUSCULAR; INTRAVENOUS PRN
Status: DISCONTINUED | OUTPATIENT
Start: 2025-05-22 | End: 2025-05-22

## 2025-05-22 RX ORDER — OXYCODONE HYDROCHLORIDE 5 MG/1
5 TABLET ORAL EVERY 4 HOURS PRN
Status: DISCONTINUED | OUTPATIENT
Start: 2025-05-22 | End: 2025-05-23 | Stop reason: HOSPADM

## 2025-05-22 RX ORDER — CEFAZOLIN SODIUM/WATER 2 G/20 ML
2 SYRINGE (ML) INTRAVENOUS
Status: COMPLETED | OUTPATIENT
Start: 2025-05-22 | End: 2025-05-22

## 2025-05-22 RX ORDER — SODIUM CHLORIDE, SODIUM LACTATE, POTASSIUM CHLORIDE, CALCIUM CHLORIDE 600; 310; 30; 20 MG/100ML; MG/100ML; MG/100ML; MG/100ML
INJECTION, SOLUTION INTRAVENOUS CONTINUOUS
Status: DISCONTINUED | OUTPATIENT
Start: 2025-05-22 | End: 2025-05-23 | Stop reason: HOSPADM

## 2025-05-22 RX ORDER — HYDROXYZINE HYDROCHLORIDE 10 MG/1
10 TABLET, FILM COATED ORAL EVERY 6 HOURS PRN
Status: DISCONTINUED | OUTPATIENT
Start: 2025-05-22 | End: 2025-05-22 | Stop reason: HOSPADM

## 2025-05-22 RX ORDER — ONDANSETRON 4 MG/1
4 TABLET, ORALLY DISINTEGRATING ORAL EVERY 30 MIN PRN
Status: DISCONTINUED | OUTPATIENT
Start: 2025-05-22 | End: 2025-05-22 | Stop reason: HOSPADM

## 2025-05-22 RX ORDER — SENNOSIDES 8.6 MG
325 CAPSULE ORAL DAILY
Status: DISCONTINUED | OUTPATIENT
Start: 2025-05-22 | End: 2025-05-23 | Stop reason: HOSPADM

## 2025-05-22 RX ORDER — FENTANYL CITRATE 0.05 MG/ML
25 INJECTION, SOLUTION INTRAMUSCULAR; INTRAVENOUS EVERY 5 MIN PRN
Status: DISCONTINUED | OUTPATIENT
Start: 2025-05-22 | End: 2025-05-22 | Stop reason: HOSPADM

## 2025-05-22 RX ORDER — SODIUM CHLORIDE, SODIUM LACTATE, POTASSIUM CHLORIDE, CALCIUM CHLORIDE 600; 310; 30; 20 MG/100ML; MG/100ML; MG/100ML; MG/100ML
INJECTION, SOLUTION INTRAVENOUS CONTINUOUS PRN
Status: DISCONTINUED | OUTPATIENT
Start: 2025-05-22 | End: 2025-05-22

## 2025-05-22 RX ORDER — CEFAZOLIN SODIUM 2 G/50ML
2 SOLUTION INTRAVENOUS EVERY 8 HOURS
Status: COMPLETED | OUTPATIENT
Start: 2025-05-22 | End: 2025-05-23

## 2025-05-22 RX ORDER — HYDROMORPHONE HCL IN WATER/PF 6 MG/30 ML
0.2 PATIENT CONTROLLED ANALGESIA SYRINGE INTRAVENOUS EVERY 5 MIN PRN
Status: DISCONTINUED | OUTPATIENT
Start: 2025-05-22 | End: 2025-05-22 | Stop reason: HOSPADM

## 2025-05-22 RX ORDER — BISACODYL 10 MG
10 SUPPOSITORY, RECTAL RECTAL DAILY PRN
Status: DISCONTINUED | OUTPATIENT
Start: 2025-05-22 | End: 2025-05-23 | Stop reason: HOSPADM

## 2025-05-22 RX ORDER — LIDOCAINE 40 MG/G
CREAM TOPICAL
Status: DISCONTINUED | OUTPATIENT
Start: 2025-05-22 | End: 2025-05-23 | Stop reason: HOSPADM

## 2025-05-22 RX ORDER — NALOXONE HYDROCHLORIDE 0.4 MG/ML
0.1 INJECTION, SOLUTION INTRAMUSCULAR; INTRAVENOUS; SUBCUTANEOUS
Status: DISCONTINUED | OUTPATIENT
Start: 2025-05-22 | End: 2025-05-22 | Stop reason: HOSPADM

## 2025-05-22 RX ORDER — HYDROMORPHONE HCL IN WATER/PF 6 MG/30 ML
0.2 PATIENT CONTROLLED ANALGESIA SYRINGE INTRAVENOUS EVERY 4 HOURS PRN
Status: DISCONTINUED | OUTPATIENT
Start: 2025-05-22 | End: 2025-05-23 | Stop reason: HOSPADM

## 2025-05-22 RX ORDER — METHOCARBAMOL 500 MG/1
500 TABLET, FILM COATED ORAL EVERY 6 HOURS PRN
Status: DISCONTINUED | OUTPATIENT
Start: 2025-05-22 | End: 2025-05-23 | Stop reason: HOSPADM

## 2025-05-22 RX ORDER — CEFAZOLIN SODIUM/WATER 2 G/20 ML
2 SYRINGE (ML) INTRAVENOUS SEE ADMIN INSTRUCTIONS
Status: DISCONTINUED | OUTPATIENT
Start: 2025-05-22 | End: 2025-05-22 | Stop reason: HOSPADM

## 2025-05-22 RX ORDER — DEXAMETHASONE SODIUM PHOSPHATE 4 MG/ML
4 INJECTION, SOLUTION INTRA-ARTICULAR; INTRALESIONAL; INTRAMUSCULAR; INTRAVENOUS; SOFT TISSUE
Status: DISCONTINUED | OUTPATIENT
Start: 2025-05-22 | End: 2025-05-22 | Stop reason: HOSPADM

## 2025-05-22 RX ORDER — EPHEDRINE SULFATE 50 MG/ML
INJECTION, SOLUTION INTRAMUSCULAR; INTRAVENOUS; SUBCUTANEOUS PRN
Status: DISCONTINUED | OUTPATIENT
Start: 2025-05-22 | End: 2025-05-22

## 2025-05-22 RX ORDER — DEXAMETHASONE SODIUM PHOSPHATE 4 MG/ML
INJECTION, SOLUTION INTRA-ARTICULAR; INTRALESIONAL; INTRAMUSCULAR; INTRAVENOUS; SOFT TISSUE PRN
Status: DISCONTINUED | OUTPATIENT
Start: 2025-05-22 | End: 2025-05-22

## 2025-05-22 RX ORDER — HYDRALAZINE HYDROCHLORIDE 20 MG/ML
2.5-5 INJECTION INTRAMUSCULAR; INTRAVENOUS EVERY 10 MIN PRN
Status: DISCONTINUED | OUTPATIENT
Start: 2025-05-22 | End: 2025-05-22 | Stop reason: HOSPADM

## 2025-05-22 RX ORDER — ONDANSETRON 2 MG/ML
4 INJECTION INTRAMUSCULAR; INTRAVENOUS EVERY 30 MIN PRN
Status: DISCONTINUED | OUTPATIENT
Start: 2025-05-22 | End: 2025-05-22 | Stop reason: HOSPADM

## 2025-05-22 RX ORDER — NALOXONE HYDROCHLORIDE 0.4 MG/ML
0.2 INJECTION, SOLUTION INTRAMUSCULAR; INTRAVENOUS; SUBCUTANEOUS
Status: DISCONTINUED | OUTPATIENT
Start: 2025-05-22 | End: 2025-05-23 | Stop reason: HOSPADM

## 2025-05-22 RX ORDER — POLYETHYLENE GLYCOL 3350 17 G/17G
17 POWDER, FOR SOLUTION ORAL DAILY
Status: DISCONTINUED | OUTPATIENT
Start: 2025-05-23 | End: 2025-05-23 | Stop reason: HOSPADM

## 2025-05-22 RX ORDER — HYDROMORPHONE HCL IN WATER/PF 6 MG/30 ML
0.1 PATIENT CONTROLLED ANALGESIA SYRINGE INTRAVENOUS EVERY 4 HOURS PRN
Status: DISCONTINUED | OUTPATIENT
Start: 2025-05-22 | End: 2025-05-23 | Stop reason: HOSPADM

## 2025-05-22 RX ORDER — HYDROXYZINE HYDROCHLORIDE 10 MG/1
10 TABLET, FILM COATED ORAL EVERY 6 HOURS PRN
Status: DISCONTINUED | OUTPATIENT
Start: 2025-05-22 | End: 2025-05-23 | Stop reason: HOSPADM

## 2025-05-22 RX ORDER — LIDOCAINE HYDROCHLORIDE 20 MG/ML
INJECTION, SOLUTION INFILTRATION; PERINEURAL PRN
Status: DISCONTINUED | OUTPATIENT
Start: 2025-05-22 | End: 2025-05-22

## 2025-05-22 RX ORDER — ACETAMINOPHEN 325 MG/1
975 TABLET ORAL EVERY 6 HOURS PRN
Qty: 100 TABLET | Refills: 0 | Status: SHIPPED | OUTPATIENT
Start: 2025-05-22

## 2025-05-22 RX ORDER — AMOXICILLIN 250 MG
1 CAPSULE ORAL 2 TIMES DAILY
Status: DISCONTINUED | OUTPATIENT
Start: 2025-05-22 | End: 2025-05-23 | Stop reason: HOSPADM

## 2025-05-22 RX ORDER — OXYCODONE HYDROCHLORIDE 5 MG/1
5-10 TABLET ORAL EVERY 6 HOURS PRN
Qty: 30 TABLET | Refills: 0 | Status: SHIPPED | OUTPATIENT
Start: 2025-05-22

## 2025-05-22 RX ORDER — AMOXICILLIN 250 MG
1-2 CAPSULE ORAL 2 TIMES DAILY
Qty: 30 TABLET | Refills: 0 | Status: SHIPPED | OUTPATIENT
Start: 2025-05-22

## 2025-05-22 RX ORDER — HYDROMORPHONE HCL IN WATER/PF 6 MG/30 ML
0.4 PATIENT CONTROLLED ANALGESIA SYRINGE INTRAVENOUS EVERY 5 MIN PRN
Status: DISCONTINUED | OUTPATIENT
Start: 2025-05-22 | End: 2025-05-22 | Stop reason: HOSPADM

## 2025-05-22 RX ORDER — VANCOMYCIN HYDROCHLORIDE 1 G/20ML
INJECTION, POWDER, LYOPHILIZED, FOR SOLUTION INTRAVENOUS PRN
Status: DISCONTINUED | OUTPATIENT
Start: 2025-05-22 | End: 2025-05-22 | Stop reason: HOSPADM

## 2025-05-22 RX ORDER — CELECOXIB 200 MG/1
200 CAPSULE ORAL DAILY
Qty: 42 CAPSULE | Refills: 0 | Status: SHIPPED | OUTPATIENT
Start: 2025-05-22 | End: 2025-07-03

## 2025-05-22 RX ORDER — KETOROLAC TROMETHAMINE 15 MG/ML
15 INJECTION, SOLUTION INTRAMUSCULAR; INTRAVENOUS EVERY 6 HOURS
Status: COMPLETED | OUTPATIENT
Start: 2025-05-22 | End: 2025-05-23

## 2025-05-22 RX ORDER — FENTANYL CITRATE 0.05 MG/ML
50 INJECTION, SOLUTION INTRAMUSCULAR; INTRAVENOUS EVERY 5 MIN PRN
Status: DISCONTINUED | OUTPATIENT
Start: 2025-05-22 | End: 2025-05-22 | Stop reason: HOSPADM

## 2025-05-22 RX ORDER — OMEPRAZOLE 20 MG/1
20 TABLET, DELAYED RELEASE ORAL DAILY
Qty: 42 TABLET | Refills: 0 | Status: SHIPPED | OUTPATIENT
Start: 2025-05-22 | End: 2025-07-03

## 2025-05-22 RX ORDER — PROPOFOL 10 MG/ML
INJECTION, EMULSION INTRAVENOUS PRN
Status: DISCONTINUED | OUTPATIENT
Start: 2025-05-22 | End: 2025-05-22

## 2025-05-22 RX ORDER — CALCIUM CARBONATE 500 MG/1
500 TABLET, CHEWABLE ORAL 4 TIMES DAILY PRN
Status: DISCONTINUED | OUTPATIENT
Start: 2025-05-22 | End: 2025-05-23 | Stop reason: HOSPADM

## 2025-05-22 RX ORDER — FENTANYL CITRATE 50 UG/ML
INJECTION, SOLUTION INTRAMUSCULAR; INTRAVENOUS PRN
Status: DISCONTINUED | OUTPATIENT
Start: 2025-05-22 | End: 2025-05-22

## 2025-05-22 RX ADMIN — PROPOFOL 250 MG: 10 INJECTION, EMULSION INTRAVENOUS at 07:43

## 2025-05-22 RX ADMIN — PROPOFOL 50 MG: 10 INJECTION, EMULSION INTRAVENOUS at 08:57

## 2025-05-22 RX ADMIN — ROCURONIUM BROMIDE 20 MG: 50 INJECTION, SOLUTION INTRAVENOUS at 08:57

## 2025-05-22 RX ADMIN — PHENYLEPHRINE HYDROCHLORIDE 100 MCG: 10 INJECTION INTRAVENOUS at 09:41

## 2025-05-22 RX ADMIN — HYDROMORPHONE HYDROCHLORIDE 0.5 MG: 1 INJECTION, SOLUTION INTRAMUSCULAR; INTRAVENOUS; SUBCUTANEOUS at 08:33

## 2025-05-22 RX ADMIN — DEXAMETHASONE SODIUM PHOSPHATE 8 MG: 4 INJECTION, SOLUTION INTRA-ARTICULAR; INTRALESIONAL; INTRAMUSCULAR; INTRAVENOUS; SOFT TISSUE at 08:20

## 2025-05-22 RX ADMIN — SODIUM CHLORIDE, SODIUM LACTATE, POTASSIUM CHLORIDE, AND CALCIUM CHLORIDE: .6; .31; .03; .02 INJECTION, SOLUTION INTRAVENOUS at 12:52

## 2025-05-22 RX ADMIN — LIDOCAINE HYDROCHLORIDE 100 MG: 20 INJECTION, SOLUTION INFILTRATION; PERINEURAL at 07:43

## 2025-05-22 RX ADMIN — OXYCODONE HYDROCHLORIDE 5 MG: 5 TABLET ORAL at 20:42

## 2025-05-22 RX ADMIN — Medication 5 MG: at 08:04

## 2025-05-22 RX ADMIN — ACETAMINOPHEN 975 MG: 325 TABLET, FILM COATED ORAL at 13:06

## 2025-05-22 RX ADMIN — SENNOSIDES AND DOCUSATE SODIUM 1 TABLET: 50; 8.6 TABLET ORAL at 20:34

## 2025-05-22 RX ADMIN — ROCURONIUM BROMIDE 10 MG: 50 INJECTION, SOLUTION INTRAVENOUS at 09:28

## 2025-05-22 RX ADMIN — FENTANYL CITRATE 50 MCG: 50 INJECTION INTRAMUSCULAR; INTRAVENOUS at 07:42

## 2025-05-22 RX ADMIN — HYDROMORPHONE HYDROCHLORIDE 0.25 MG: 1 INJECTION, SOLUTION INTRAMUSCULAR; INTRAVENOUS; SUBCUTANEOUS at 09:29

## 2025-05-22 RX ADMIN — HYDROMORPHONE HYDROCHLORIDE 0.2 MG: 0.2 INJECTION, SOLUTION INTRAMUSCULAR; INTRAVENOUS; SUBCUTANEOUS at 11:48

## 2025-05-22 RX ADMIN — ROCURONIUM BROMIDE 30 MG: 50 INJECTION, SOLUTION INTRAVENOUS at 08:33

## 2025-05-22 RX ADMIN — PHENYLEPHRINE HYDROCHLORIDE 100 MCG: 10 INJECTION INTRAVENOUS at 07:44

## 2025-05-22 RX ADMIN — ROCURONIUM BROMIDE 10 MG: 50 INJECTION, SOLUTION INTRAVENOUS at 10:00

## 2025-05-22 RX ADMIN — HYDROMORPHONE HYDROCHLORIDE 0.2 MG: 0.2 INJECTION, SOLUTION INTRAMUSCULAR; INTRAVENOUS; SUBCUTANEOUS at 11:36

## 2025-05-22 RX ADMIN — PREGABALIN 150 MG: 150 CAPSULE ORAL at 06:34

## 2025-05-22 RX ADMIN — SODIUM CHLORIDE, SODIUM LACTATE, POTASSIUM CHLORIDE, AND CALCIUM CHLORIDE: .6; .31; .03; .02 INJECTION, SOLUTION INTRAVENOUS at 07:33

## 2025-05-22 RX ADMIN — FENTANYL CITRATE 50 MCG: 0.05 INJECTION, SOLUTION INTRAMUSCULAR; INTRAVENOUS at 11:15

## 2025-05-22 RX ADMIN — HYDROMORPHONE HYDROCHLORIDE 0.25 MG: 1 INJECTION, SOLUTION INTRAMUSCULAR; INTRAVENOUS; SUBCUTANEOUS at 10:22

## 2025-05-22 RX ADMIN — FENTANYL CITRATE 50 MCG: 0.05 INJECTION, SOLUTION INTRAMUSCULAR; INTRAVENOUS at 11:27

## 2025-05-22 RX ADMIN — TRANEXAMIC ACID 1950 MG: 650 TABLET ORAL at 06:34

## 2025-05-22 RX ADMIN — OXYCODONE HYDROCHLORIDE 5 MG: 5 TABLET ORAL at 16:01

## 2025-05-22 RX ADMIN — PHENYLEPHRINE HYDROCHLORIDE 100 MCG: 10 INJECTION INTRAVENOUS at 08:05

## 2025-05-22 RX ADMIN — PHENYLEPHRINE HYDROCHLORIDE 200 MCG: 10 INJECTION INTRAVENOUS at 10:12

## 2025-05-22 RX ADMIN — KETOROLAC TROMETHAMINE 15 MG: 15 INJECTION, SOLUTION INTRAMUSCULAR; INTRAVENOUS at 13:08

## 2025-05-22 RX ADMIN — ROCURONIUM BROMIDE 25 MG: 50 INJECTION, SOLUTION INTRAVENOUS at 09:33

## 2025-05-22 RX ADMIN — ROCURONIUM BROMIDE 50 MG: 50 INJECTION, SOLUTION INTRAVENOUS at 07:44

## 2025-05-22 RX ADMIN — CEFAZOLIN SODIUM 2 G: 2 SOLUTION INTRAVENOUS at 16:04

## 2025-05-22 RX ADMIN — FENTANYL CITRATE 50 MCG: 50 INJECTION INTRAMUSCULAR; INTRAVENOUS at 07:37

## 2025-05-22 RX ADMIN — KETOROLAC TROMETHAMINE 15 MG: 15 INJECTION, SOLUTION INTRAMUSCULAR; INTRAVENOUS at 20:34

## 2025-05-22 RX ADMIN — Medication 200 MG: at 10:27

## 2025-05-22 RX ADMIN — SODIUM CHLORIDE, SODIUM LACTATE, POTASSIUM CHLORIDE, AND CALCIUM CHLORIDE: .6; .31; .03; .02 INJECTION, SOLUTION INTRAVENOUS at 06:49

## 2025-05-22 RX ADMIN — MIDAZOLAM 1 MG: 1 INJECTION INTRAMUSCULAR; INTRAVENOUS at 07:39

## 2025-05-22 RX ADMIN — SODIUM CHLORIDE, SODIUM LACTATE, POTASSIUM CHLORIDE, AND CALCIUM CHLORIDE: .6; .31; .03; .02 INJECTION, SOLUTION INTRAVENOUS at 08:58

## 2025-05-22 RX ADMIN — LIDOCAINE HYDROCHLORIDE 100 MG: 20 INJECTION, SOLUTION INFILTRATION; PERINEURAL at 08:57

## 2025-05-22 RX ADMIN — ONDANSETRON 4 MG: 2 INJECTION INTRAMUSCULAR; INTRAVENOUS at 10:12

## 2025-05-22 RX ADMIN — Medication 10 MG: at 09:35

## 2025-05-22 RX ADMIN — Medication 2 G: at 07:37

## 2025-05-22 RX ADMIN — ASPIRIN 325 MG: 325 TABLET, COATED ORAL at 13:06

## 2025-05-22 ASSESSMENT — ACTIVITIES OF DAILY LIVING (ADL)
ADLS_ACUITY_SCORE: 36
ADLS_ACUITY_SCORE: 36
ADLS_ACUITY_SCORE: 37
ADLS_ACUITY_SCORE: 39
ADLS_ACUITY_SCORE: 36
ADLS_ACUITY_SCORE: 37
ADLS_ACUITY_SCORE: 37
ADLS_ACUITY_SCORE: 39
ADLS_ACUITY_SCORE: 37
ADLS_ACUITY_SCORE: 39
ADLS_ACUITY_SCORE: 37
ADLS_ACUITY_SCORE: 39
ADLS_ACUITY_SCORE: 37
ADLS_ACUITY_SCORE: 36

## 2025-05-22 ASSESSMENT — LIFESTYLE VARIABLES: TOBACCO_USE: 1

## 2025-05-22 NOTE — ANESTHESIA PREPROCEDURE EVALUATION
Anesthesia Pre-Procedure Evaluation    Patient: Castillo Alba   MRN: [id number] : 1955          Procedure : Procedure(s):  RIGHT TOTAL HIP ARTHROPLASTY DIRECT ANTERIOR APPROACH WITH ORTHOGRID         Past Medical History:   Diagnosis Date    C. difficile colitis 2023    Diverticulosis     History of dental abscess     MDD (major depressive disorder)     Obesity     Primary osteoarthritis of right hip     Sinusitis, chronic     trouble breathing through nose    Ventral hernia without obstruction or gangrene 2024      Past Surgical History:   Procedure Laterality Date    COLONOSCOPY N/A 2024    Procedure: Colonoscopy;  Surgeon: Alejandra Cruz MD;  Location:  GI    COMBINED CYSTOSCOPY, INSERT CATHETER URETER Bilateral 2024    Procedure: CYSTOSCOPY, WITH URETERAL OPEN ENDED CATHETER INSERTION - BILATERAL;  Surgeon: Hermann Mayberry MD;  Location:  OR    DAVINCI XI ASSISTED COLOSTOMY TAKEDOWN N/A 2024    Procedure: ROBOTIC ASSISTED COLOSTOMY CLOSURE;  Surgeon: Alejandra Cruz MD;  Location:  OR    DAVINCI XI ASSISTED RESECTION RECTOSIGMOID N/A 2024    Procedure: Robotic assisted sigmoid colectomy, mobilization of splenic flexor;  Surgeon: Alejandra Cruz MD;  Location:  OR    drain placement  2024    LAPAROTOMY, LYSIS ADHESIONS, COMBINED N/A 2024    Procedure: EXPLORATORY LAPAROTOMY, RESECTION OF COLORECTAL ANASTAMOIS, ABDOMINAL WASHOUT, END COLOSTOMY;  Surgeon: Alejandra Cruz MD;  Location:  OR    NOSE SURGERY        Not on File   Social History     Tobacco Use    Smoking status: Former     Current packs/day: 0.00     Types: Cigarettes     Quit date: 2024     Years since quittin.1     Passive exposure: Current (occassionally)    Smokeless tobacco: Never   Substance Use Topics    Alcohol use: Not Currently      Wt Readings from Last 1 Encounters:   25 105.5 kg (232 lb 9.6 oz)        Anesthesia Evaluation   Pt has had  Called  advised with him to take wife noreen to ER let them check her out and can follow up with Dr Rylan Tang "prior anesthetic.     No history of anesthetic complications       ROS/MED HX  ENT/Pulmonary:     (+)                tobacco use, Past use,                       Neurologic:       Cardiovascular:     (+)  - -   -  - -                                 Previous cardiac testing   Echo: Date: Results:    Stress Test:  Date: Results:    ECG Reviewed:  Date: 4/24 Results:  NSR  Cath:  Date: Results:      METS/Exercise Tolerance:     Hematologic:     (+)      anemia,          Musculoskeletal:       GI/Hepatic: Comment: S/p colectomy for diverticulitis, with ostomy reversal       Renal/Genitourinary:       Endo:     (+)               Obesity,       Psychiatric/Substance Use:     (+) psychiatric history depression       Infectious Disease:       Malignancy:       Other:              Physical Exam  Airway  Mallampati: II  TM distance: >3 FB  Neck ROM: full  Mouth opening: >= 4 cm    Cardiovascular - normal exam   Dental   (+) Minor Abnormalities - some fillings, tiny chips      Pulmonary - normal examBreath sounds clear to auscultation        Neurological - normal exam  He appears awake, alert and oriented x3.    Other Findings       OUTSIDE LABS:  CBC:   Lab Results   Component Value Date    WBC 7.0 11/06/2024    WBC 13.3 (H) 05/05/2024    HGB 13.6 11/08/2024    HGB 14.1 11/07/2024    HCT 47.6 11/06/2024    HCT 27.7 (L) 05/05/2024     11/09/2024     11/07/2024     BMP:   Lab Results   Component Value Date     11/07/2024     11/06/2024    POTASSIUM 3.8 11/07/2024    POTASSIUM 4.2 11/06/2024    CHLORIDE 105 11/07/2024    CHLORIDE 101 11/06/2024    CO2 24 11/07/2024    CO2 22 11/06/2024    BUN 14.1 11/07/2024    BUN 14.8 11/06/2024    CR 0.82 11/07/2024    CR 0.78 11/06/2024     (H) 05/22/2025     (H) 11/08/2024     COAGS:   Lab Results   Component Value Date    INR 1.15 05/06/2024     POC: No results found for: \"BGM\", \"HCG\", \"HCGS\"  HEPATIC:   Lab Results   Component Value Date    ALBUMIN " "2.6 (L) 05/06/2024    PROTTOTAL 6.5 05/06/2024    ALT 17 05/06/2024    AST 22 05/06/2024    ALKPHOS 201 (H) 05/06/2024    BILITOTAL 0.8 05/06/2024     OTHER:   Lab Results   Component Value Date    LACT 1.2 02/18/2024    LENORE 8.9 11/07/2024    PHOS 2.9 11/07/2024    MAG 1.9 11/07/2024       Anesthesia Plan    ASA Status:  2      NPO Status: NPO Appropriate   Anesthesia Type: General.  Airway: oral.  Induction: intravenous.  Maintenance: Balanced.   Techniques and Equipment:       - Monitoring Plan: standard ASA monitoring     Consents    Anesthesia Plan(s) and associated risks, benefits, and realistic alternatives discussed. Questions answered and patient/representative(s) expressed understanding.     - Discussed: CRNA     - Discussed with:                Postoperative Care    Pain management: multimodal analgesia.     Comments:                   Nolan Hewitt MD    I have reviewed the pertinent notes and labs in the chart from the past 30 days and (re)examined the patient.  Any updates or changes from those notes are reflected in this note.    Clinically Significant Risk Factors Present on Admission                             # Obesity: Estimated body mass index is 30.69 kg/m  as calculated from the following:    Height as of this encounter: 1.854 m (6' 1\").    Weight as of this encounter: 105.5 kg (232 lb 9.6 oz).       # Financial/Environmental Concerns:                 "

## 2025-05-22 NOTE — BRIEF OP NOTE
[facility]    Brief Operative Note    Pre-operative diagnosis: Primary osteoarthritis of right hip [M16.11]  Post-operative diagnosis Same as pre-operative diagnosis    Procedure: RIGHT TOTAL HIP ARTHROPLASTY DIRECT ANTERIOR APPROACH WITH ORTHOGRID, Right - Hip    Surgeon: Surgeons and Role:     * Horacio Alegria MD - Primary  Anesthesia: General   Estimated Blood Loss: 500 mL from 5/22/2025  7:33 AM to 5/22/2025 10:45 AM      Drains: None  Specimens: * No specimens in log *  Findings:   None.  Complications: None.  Implants:   Implant Name Type Inv. Item Serial No.  Lot No. LRB No. Used Action   IMP APEX HOLE ELIMINATOR HIP DEPUY DURALOC 1246- - [id number] Metallic Hardware/Jones IMP APEX HOLE ELIMINATOR HIP DEPUY DURALOC 1246-  [employer]- [id number] Right 1 Implanted   IMP CUP ACE PINNACLE 62MM 1217- - [id number] Total Joint Component/Insert IMP CUP ACE PINNACLE 62MM 1217-  [employer]- [id number] Right 1 Implanted   IMP SCR BONE CAN ACE 6.5X35MM 1217- - [id number] Metallic Hardware/Jones IMP SCR BONE CAN ACE 6.5X35MM 1217-  [employer]- [id number] Right 1 Implanted   IMP SCR BONE CAN ACE 6.5X25MM 1217- - [id number] Metallic Hardware/Jones IMP SCR BONE CAN ACE 6.5X25MM 1217-  [employer]- [id number] Right 1 Implanted   LINER ACETABULAR ALTRX NEUTRAL 34F21PM - [id number] Total Joint Component/Insert LINER ACETABULAR ALTRX NEUTRAL [id number]  [employer]- [id number] Right 1 Implanted   IMP STEM FEM DEPUY ACTIS COLLAR HI-OFFSET SZ 6MM 1010- - [id number] Total Joint Component/Insert IMP STEM FEM DEPUY ACTIS COLLAR HI-OFFSET SZ 6MM 1010-  [employer]- [id number] Right 1 Implanted   IMP HEAD FEMORAL DEPUY CERAMIC 36MM +1.5MM 1365- - [id number] Total Joint Component/Insert IMP HEAD FEMORAL DEPUY CERAMIC 36MM +1.5MM 1365-  [employer] [facility]- [id number] Right 1 Implanted

## 2025-05-22 NOTE — ANESTHESIA POSTPROCEDURE EVALUATION
Patient: Castillo Alba    Procedure: Procedure(s):  RIGHT TOTAL HIP ARTHROPLASTY DIRECT ANTERIOR APPROACH WITH ORTHOGRID       Anesthesia Type:  General    Note:  Disposition: Inpatient   Postop Pain Control: Uneventful            Sign Out: Well controlled pain   PONV: No   Neuro/Psych: Uneventful            Sign Out: Acceptable/Baseline neuro status   Airway/Respiratory: Uneventful            Sign Out: Acceptable/Baseline resp. status   CV/Hemodynamics: Uneventful            Sign Out: Acceptable CV status   Other NRE: NONE   DID A NON-ROUTINE EVENT OCCUR? No           Last vitals:  Vitals Value Taken Time   /86 05/22/25 12:00   Temp 35.6  C (96.08  F) 05/22/25 12:27   Pulse 60 05/22/25 12:26   Resp 10 05/22/25 12:26   SpO2 98 % 05/22/25 12:28   Vitals shown include unfiled device data.    Electronically Signed By: Nolan Hewitt MD  May 22, 2025  2:29 PM

## 2025-05-22 NOTE — ANESTHESIA CARE TRANSFER NOTE
Patient: Castillo Alba    Procedure: Procedure(s):  RIGHT TOTAL HIP ARTHROPLASTY DIRECT ANTERIOR APPROACH WITH ORTHOGRID       Diagnosis: Primary osteoarthritis of right hip [M16.11]  Diagnosis Additional Information: No value filed.    Anesthesia Type:   General     Note:    Oropharynx: oropharynx clear of all foreign objects and spontaneously breathing  Level of Consciousness: drowsy  Oxygen Supplementation: face mask  Level of Supplemental Oxygen (L/min / FiO2): 6  Independent Airway: airway patency satisfactory and stable  Dentition: dentition unchanged  Vital Signs Stable: post-procedure vital signs reviewed and stable  Report to RN Given: handoff report given  Patient transferred to: PACU    Handoff Report: Identifed the Patient, Identified the Reponsible Provider, Reviewed the pertinent medical history, Discussed the surgical course, Reviewed Intra-OP anesthesia mangement and issues during anesthesia, Set expectations for post-procedure period and Allowed opportunity for questions and acknowledgement of understanding      Vitals:  Vitals Value Taken Time   /74 05/22/25 10:52   Temp 36.4  C (97.52  F) 05/22/25 10:53   Pulse 78 05/22/25 10:54   Resp 10 05/22/25 10:54   SpO2 93 % 05/22/25 10:54   Vitals shown include unfiled device data.    Electronically Signed By: VENANCIO Haney CRNA  May 22, 2025  10:55 AM

## 2025-05-22 NOTE — ANESTHESIA PROCEDURE NOTES
Airway       Patient location during procedure: OR       Procedure Start/Stop Times: 5/22/2025 7:47 AM  Staff -        Anesthesiologist:  Nolan Hewitt MD       Resident/Fellow: Michael Mckeon       CRNA: Eden Ayala APRN CRNA       Performed By: JON  Consent for Airway        Urgency: elective  Indications and Patient Condition       Indications for airway management: karen-procedural       Induction type:intravenous       Mask difficulty assessment: 2 - vent by mask + OA or adjuvant +/- NMBA    Final Airway Details       Final airway type: endotracheal airway       Successful airway: ETT - single  Endotracheal Airway Details        ETT size (mm): 8.0       Cuffed: yes       Successful intubation technique: video laryngoscopy (DLx1 by JON with Goldstein 2, no cords - DLx1 by [facility] cords open and clear, elecrtive Glidescope)       VL Blade Size: Glidescope 3 (elective)       Grade View of Cords: 1       Adjucts: stylet       Position: Right       Measured from: gums/teeth       Secured at (cm): 24       Bite block used: None    Post intubation assessment        Placement verified by: capnometry, equal breath sounds and chest rise        Number of attempts at approach: 2       Number of other approaches attempted: 0       Secured with: tape       Ease of procedure: easy       Dentition: Intact and Unchanged    Medication(s) Administered   Medication Administration Time: 5/22/2025 7:47 AM

## 2025-05-22 NOTE — PROGRESS NOTES
"   05/22/25 1600   Appointment Info   Signing Clinician's Name / Credentials (PT) Pro Lopez DPT   Rehab Comments (PT) WBAT, no precautions   Quick Adds   Quick Adds Certification   Living Environment   People in Home spouse;child(eleno), adult   Current Living Arrangements house   Home Accessibility stairs to enter home   Number of Stairs, Main Entrance 2   Stair Railings, Main Entrance none   Transportation Anticipated family or friend will provide   Living Environment Comments Pt lives in a house with his spouse and adult children. Stairs to enter. Pt reports his son will pick him up upon discharge and provide assist as needed.   Self-Care   Usual Activity Tolerance good   Current Activity Tolerance moderate   Regular Exercise No   Equipment Currently Used at Home walker, rolling;raised toilet seat   Fall history within last six months no   Activity/Exercise/Self-Care Comment Pt reports being IND at baseline with all ADLs. Pt ambulates w/o an AD at baseline but has a FWW if needed. Tub shower. Pt drives.   General Information   Onset of Illness/Injury or Date of Surgery 05/22/25   Referring Physician Horacio Alegria MD   Patient/Family Therapy Goals Statement (PT) \"To get better\"   Pertinent History of Current Problem (include personal factors and/or comorbidities that impact the POC) s/p R RHYS POD#0   Existing Precautions/Restrictions fall   Weight-Bearing Status - LLE full weight-bearing   Weight-Bearing Status - RLE weight-bearing as tolerated   Cognition   Orientation Status (Cognition) oriented x 4   Pain Assessment   Patient Currently in Pain Yes, see Vital Sign flowsheet  (5/10 in R hip)   Integumentary/Edema   Integumentary/Edema Comments Incision on R hip with dressing intact   Posture    Posture Forward head position;Protracted shoulders   Range of Motion (ROM)   Range of Motion ROM deficits secondary to pain;ROM deficits secondary to swelling;ROM deficits secondary to weakness;ROM deficits " secondary to surgical procedure   ROM Comment RLE ROM limited due to RHYS   Strength (Manual Muscle Testing)   Strength (Manual Muscle Testing) Deficits observed during functional mobility;Able to perform L SLR   Bed Mobility   Comment, (Bed Mobility) Supine>sit w/ CGA   Transfers   Comment, (Transfers) Sit>stand w/ FWW and CGA   Gait/Stairs (Locomotion)   [place] Level (Gait) contact guard   Assistive Device (Gait) walker, front-wheeled   Distance in Feet (Gait) 5'   Balance   Balance Comments Adequate static sitting balance; pt ambulates w/ a FWW   Sensory Examination   Sensory Perception patient reports no sensory changes   Clinical Impression   Criteria for Skilled Therapeutic Intervention Yes, treatment indicated   PT Diagnosis (PT) Impaired gait   Influenced by the following impairments Decreased activity tolerance; decreased balance; decreased strength   Functional limitations due to impairments Impaired functional mobility   Clinical Presentation (PT Evaluation Complexity) stable   Clinical Presentation Rationale Clinical judgement   Clinical Decision Making (Complexity) low complexity   Planned Therapy Interventions (PT) balance training;bed mobility training;gait training;patient/family education;stair training;strengthening;transfer training;progressive activity/exercise   Risk & Benefits of therapy have been explained evaluation/treatment results reviewed;care plan/treatment goals reviewed;risks/benefits reviewed;current/potential barriers reviewed;participants voiced agreement with care plan;participants included;patient   PT Total Evaluation Time   PT Eval, Low Complexity Minutes (01213) 10   Therapy Certification   Start of care date 05/22/25   Certification date from 05/22/25   Certification date to 05/27/25   Medical Diagnosis s/p R RHYS   Physical Therapy Goals   PT Frequency 2x/day   PT Predicted Duration/Target Date for Goal Attainment 05/28/25   PT Goals Bed Mobility;Transfers;Gait;Stairs    PT: Bed Mobility Supervision/stand-by assist;Supine to/from sit   PT: Transfers Supervision/stand-by assist;Sit to/from stand;Assistive device   PT: Gait Supervision/stand-by assist;Assistive device;100 feet   PT: Stairs Supervision/stand-by assist;Assistive device;2 stairs   Interventions   Interventions Quick Adds Gait Training;Therapeutic Activity;Therapeutic Procedure   Therapeutic Procedure/Exercise   Ther. Procedure: strength, endurance, ROM, flexibillity Minutes (07588) 4   Symptoms Noted During/After Treatment increased pain   Treatment Detail/Skilled Intervention Pt completed the following supine LE exercises x 10 reps to improve strength and functional mobility: ankle pumps, quad sets, and glute sets. Verbal and tactile cues for technique. Pt tolerated well.   Therapeutic Activity   Therapeutic Activities: dynamic activities to improve functional performance Minutes (60871) 15   Symptoms Noted During/After Treatment Increased pain   Treatment Detail/Skilled Intervention Greeted pt supine in bed, agreed to PT. VSS on RA throughout session. PT educated pt on WBAT status, AD use, walking program, icing/elevation regimen, and safe sleeping positions, pt voiced understanding. Pt performed supine>sit w/ CGA. Once in sitting, pt able to scoot self to EOB and sit unsupported without LOB. Pt performed sit>stand x 3 w/ FWW and CGA, cues for hand placement. Pt ambulated to the bathroom, able to perform hygiene without assist. After ambulation, pt returned to supine in bed w/ CGA, demonstrating ability to safely lift BLEs back into bed and reposition self. Pt ended session supine in bed, with all needs met and call light within reach.   Gait Training   Gait Training Minutes (73350) 13   Symptoms Noted During/After Treatment (Gait Training) fatigue;increased pain   Treatment Detail/Skilled Intervention Pt ambulated w/ FWW and CGA. Pt ambulated with decreased gait speed, downward gaze, decreased step length, and heavy  reliance on FWW. Verbal cues for upright gaze and posture, to increase step length, and to reduce reliance on FWW. Good carryover with cues. No LOB noted and pt tolerated activity well.   Distance in Feet 100'   PT Discharge Planning   PT Plan Sit>stands; progress gait w/ FWW; stairs (2 platform steps)   PT Discharge Recommendation (DC Rec)   (Defer to ortho)   PT Rationale for DC Rec Pt is below baseline but is moving well. Pt currently performing all functional mobility w/ FWW and CGA. Anticipate at time of discharge pt will be able to safely return home with assist from family. Recommend pt use a FWW at discharge which pt reports he already owns.   PT Brief overview of current status Goals of therapy will be to address safe mobility and make recs for d/c to next level of care. Pt and RN will continue to follow all falls risk precautions as documented by RN staff while hospitalized. CGA w/ FWW   PT Total Distance Amb During Session (feet) 100   Physical Therapy Time and Intention   Timed Code Treatment Minutes 32   Total Session Time (sum of timed and untimed services) 42                                                                                       [facility]      OUTPATIENT PHYSICAL THERAPY EVALUATION  PLAN OF TREATMENT FOR OUTPATIENT REHABILITATION  (COMPLETE FOR INITIAL CLAIMS ONLY)  Patient's Last Name, First Name, M.I.  YOB: 1955  Castillo Alba                        Provider's Name  [facility] Medical Record No.  [id number]                               Onset Date:  05/22/25   Start of Care Date:  05/22/25      Type:     _X_PT   ___OT   ___SLP Medical Diagnosis:  s/p R RHYS                        PT Diagnosis:  Impaired gait   Visits from SOC:  1   _________________________________________________________________________________  Plan of Treatment/Functional Goals    Planned Interventions: balance training, bed mobility  training, gait training, patient/family education, stair training, strengthening, transfer training, progressive activity/exercise     Goals: See Physical Therapy Goals on Care Plan in [facility] electronic health record.    Therapy Frequency: 2x/day  Predicted Duration of Therapy Intervention: 05/28/25  _________________________________________________________________________________    I CERTIFY THE NEED FOR THESE SERVICES FURNISHED UNDER        THIS PLAN OF TREATMENT AND WHILE UNDER MY CARE     (Physician attestation of this document indicates review and certification of the therapy plan).              Certification date from: 05/22/25, Certification date to: 05/27/25    Referring Physician: Horacio Alegria MD            Initial Assessment        See Physical Therapy evaluation dated 05/22/25 in Epic electronic health record.

## 2025-05-22 NOTE — CONSULTS
"[facility]  Consult Note - Hospitalist Service     Date of Admission:  5/22/2025  Consult Requested by: Dr. Hernandez  Reason for Consult: Post-op med rec and medical management   PRIMARY CARE PROVIDER:    Martín Morley    Assessment & Plan   Castillo Alba is a 69 year old male admitted on 5/22/2025 after undergoing an elective right RHYS.    Past medical history significant for OA, Obesity, MDD with anxiety.      Surgery was completed on 5/22/25 under general anesthesia with an EBL documented at 500 ml.       OA of right hip  S/p right RHYS  - Orthopedic surgery is managing.   --Defer pain management, DVT prophylaxis, PT/OT consultations.    - HGB ordered 05/23/25.    - Encourage utilization of incentive spirometer.     Bradycardia  *Pre-op EKG reviewed and noted sinus bradycardia with 1st degree AV Block.  No interventions at this time.      Major depressive disorder with anxiety  *Confirmed with patient that he is no longer taking any prescribed medications.    Obesity   *Body mass index is 30.69 kg/m .   Increase in all-cause morbidity and mortality.   - Follow up with PCP regarding ongoing management.      Clinically Significant Risk Factors Present on Admission        # Obesity: Estimated body mass index is 30.69 kg/m  as calculated from the following:    Height as of this encounter: 1.854 m (6' 1\").    Weight as of this encounter: 105.5 kg (232 lb 9.6 oz).       # Financial/Environmental Concerns:           Diet: Discharge Instruction - Regular Diet Adult  Regular Diet Adult     DVT Prophylaxis: Defer to primary service   [place] Catheter: Not present  Lines: None     Cardiac Monitoring: None  Code Status: Full Code      Disposition Plan    Disposition and discharge per Orthopedic Surgery    Medically Ready for Discharge: Anticipated Tomorrow     The patient's care was discussed with the Patient.  Reviewed Pre-op H&P, Op notes.    The patient has been discussed with Dr. Caballero, " who agrees with the assessment and plan at this time.    At this time, I'd like to thank Dr. Hernandez for consulting the Hospitalist service.  We will sign off.      OLIVIA [facility]  Securely message with the Vocera Web Console (learn more here)    ______________________________________________________________________    Chief Complaint   Elective right RHYS    History is obtained from the patient and EMR.      History of Present Illness   Castillo Alba is a 69 year old male admitted on 5/22/2025 after undergoing an elective right RHYS.    Past medical history significant for OA, Obesity, MDD with anxiety.      Surgery was completed on 5/22/25 under general anesthesia with an EBL documented at 500 ml.       Patient was seen in his hospital room where he was resting comfortably in bed upon arrival.  During questioning, patient indicated that last week on Tuesday he felt he developed heat exhaustion while outside moving mulch to the point that he almost passed out.  He reported that he does not drink enough water at baseline and does experience lightheadedness occasionally.  He also described experiencing the lateral left knee pain when kneeling down and has been informed that it might be tendinitis.  Otherwise he offers no other questions or complaints.    Patient reported that he is not on any prescribed medications at this time.    Discussed and reviewed CODE STATUS with the patient and he elected be full code.    Patient resides in a house with his wife and 2 sons in [place], [place].  Patient reported he is a former smoker but does occasionally have 1 or 2 cigarettes as his wife smokes and both of his sons vape.  He does not consume alcohol or use recreational drugs.  He does not typically use a cane/walker/supplemental oxygen/CPAP machine.    Past Medical History    I have reviewed this patient's medical history and updated it with pertinent information if  needed.   Past Medical History:   Diagnosis Date    C. difficile colitis 12/2023    Diverticulosis     History of dental abscess     MDD (major depressive disorder)     Obesity     Primary osteoarthritis of right hip     Sinusitis, chronic     trouble breathing through nose    Ventral hernia without obstruction or gangrene 11/05/2024   OA, Obesity, MDD with anxiety.      Medications   None     Allergies   No Known Allergies  Physical Exam   Vital Signs: Temp: 97.6  F (36.4  C) Temp src: Oral BP: 117/65 Pulse: 64   Resp: 11 SpO2: 94 % O2 Device: None (Room air) Oxygen Delivery: 2 LPM  Weight: 232 lbs 9.6 oz    Constitutional: Awake, alert, cooperative, no apparent distress.  ENT: Normocephalic, without obvious abnormality, atraumatic, oral pharynx with moist mucus membranes, tonsils without erythema or exudates.  Eyes extra occular movements intact.  Normal sclera.    Neck: Supple, symmetrical, trachea midline, no adenopathy.  Pulmonary: No increased work of breathing, fair air exchange, clear to auscultation bilaterally, no crackles or wheezing.  Cardiovascular: Regular rate and rhythm, normal S1 and S2, no S3 or S4, and no murmur noted.  GI: Normal bowel sounds, soft, non-distended, non-tender.  Obese.  Skin/Integumen: Visualized skin appeared clear.  Neuro: CN II-XII grossly intact.    Psych:  Alert and oriented x 3. Normal affect.  Extremities: No lower extremity edema noted, and calves are non-tender to palpation bilaterally.     Medical Decision Making       Please see A&P for additional details of medical decision making.  40 MINUTES SPENT BY ME on the date of service doing chart review, history, exam, documentation & further activities per the note.       Data   Results for orders placed or performed during the hospital encounter of 05/22/25 (from the past 24 hours)   Glucose by meter   Result Value Ref Range    GLUCOSE BY METER POCT 136 (H) 70 - 99 mg/dL   XR Surgery STANISLAV L/T 5 Min Fluoro w Stills     Narrative    This exam was marked as non-reportable because it will not be read by a   radiologist or a [place] non-radiologist provider.

## 2025-05-22 NOTE — PLAN OF CARE
Patient vital signs are at baseline: Yes  Patient able to ambulate as they were prior to admission or with assist devices provided by therapies during their stay:  Yes  Patient MUST void prior to discharge:  YES  Patient able to tolerate oral intake:  Yes  Pain has adequate pain control using Oral analgesics:  Yes  Does patient have an identified :  Yes  Has goal D/C date and time been discussed with patient:  Yes     Arrived at 1245 from PACU. Up with 1 and walker. Pain managed with oxycodone, tylenol and toradol. Dressing C/D/I.

## 2025-05-22 NOTE — OP NOTE
DATE OF SERVICE:  5/22/2025    SURGEON  PURNIMA LIM M.D.    ASSISTANT    Shahla Morris PA-C - Assisting    PREOPERATIVE DIAGNOSIS   Right hip osteoarthritis, failed to respond to conservative management.    POSTOPERATIVE DIAGNOSIS   Right hip osteoarthritis, failed to respond to conservative management.    TITLE OF PROCEDURE   Right total hip arthroplasty, Depuy uncemented components, direct anterior approach.   Ortho-Grid computer assisted fluoroscopic hip navigation.    PROCEDURE  The patient was brought to the operating room and after satisfactory anesthesia was placed on the Ida table. The right  lower extremity was then prepped and draped in the usual sterile fashion. Ortho-Grid computer assisted fluoroscopic hip navigation was utilized during the case for component positioning as well as leg length and offset assessment. An incision was made just lateral to the ASIS and coursing toward the proximal femur. Dissection was carried down to the TFL. The fascia overlying the TFL was incised and dissection was carried down to the interval between TFL and rectus femoris. This was identified. A lateral cobra retractor was placed followed by a medial cobra around the femoral neck. The leash vessels, anterior circumflex, was identified and was coagulated. The underlying fascia was released. Dissection was carried down to the hip capsule. Capsule was identified and a capsulotomy was performed in a T-type fashion first along the intertrochanteric line and then secondarily up along the femoral neck and head, finally completed by releasing along the saddle of the trochanter. The capsular edges were tagged for later repair. The hip was then externally rotated and medial capsular release was performed such that the lesser trochanter could be palpated. Following this, the femoral neck was osteotomized as per the preoperative plan. The femoral head was removed with a corkscrew without difficulty. The acetabulum was  exposed and was reamed sequentially up to 62 mm. This was reamed under both direct visualization as well as the aid of image intensification. A 62 mm [place] cup was impacted into place in approximately 40 to 45 degrees of abduction, and 20 degrees of anteversion. Two screws were placed and this gave excellent fixation. The 36 mm neutral liner was impacted into place.     Attention was then directed to the femur. The hook was placed underneath the proximal aspect of the femur between the trochanteric ridge and gluteus alexandro. The hip was then brought into external rotation, adduction, and extension. The femur was then carefully elevated using the appropriate releases off the inside of the greater trochanter. Once the femur was elevated, a starter broach was placed followed by sequential broaches. The broaches were performed up to size 6 Actis, which gave excellent torsinal as well as axial stability. Trial reduction was performed with a high offset +1.5mm head. The hip was reduced and with hip reduction the combined anteversion looked excellent. The hip was brought into full extension and external rotation. There was no evidence of instability. As well, x-rays were printed and compared with the opposite side and found to have good length and restoration of offset.  It was felt that an additional stem size could not be placed.   The hip was then dislocated and then the proximal femur was then brought back up into the proximal aspect of the wound. The real size 6 actis stem, high offset was impacted into place. Again this gave excellent torsion as well as axial stability. The real +1.5mm ceramic biolox head was impacted into place and the hip was reduced again. The image intensification confirmed excellent position of the components.   A 3 minute dilute betadine soak was performed.  The wound was thoroughly irrigated. One gram of vancomycin powder was placed deep and superficial prior to closure.  The capsule was  closed with interrupted 0 Vicryl suture and tissues infiltrated with toradol/marcaine mixture. The tensor fascia was closed with a running 0 Stratafix suture. The subcutaneous layer was closed with interrupted 2-0 Vicryl, 2-0 Stratafix, and 3-0 subcuticular monocryl was placed followed by a mesh dressing with skin glue. Sterile dressing was applied. The patient left the operating room in satisfactory condition. Patient received 1 gm of tranexamic acid pre-op.    A skilled first assistant was necessary for this procedure for assistance with patient positioning, prepping, draping, surgical visualization, performance of the repair, wound closure, and application of the dressing.

## 2025-05-23 ENCOUNTER — APPOINTMENT (OUTPATIENT)
Dept: PHYSICAL THERAPY | Facility: CLINIC | Age: 70
End: 2025-05-23
Attending: ORTHOPAEDIC SURGERY
Payer: COMMERCIAL

## 2025-05-23 ENCOUNTER — APPOINTMENT (OUTPATIENT)
Dept: OCCUPATIONAL THERAPY | Facility: CLINIC | Age: 70
End: 2025-05-23
Attending: ORTHOPAEDIC SURGERY
Payer: COMMERCIAL

## 2025-05-23 VITALS
SYSTOLIC BLOOD PRESSURE: 133 MMHG | OXYGEN SATURATION: 97 % | RESPIRATION RATE: 18 BRPM | HEART RATE: 53 BPM | DIASTOLIC BLOOD PRESSURE: 64 MMHG | WEIGHT: 232.6 LBS | HEIGHT: 73 IN | TEMPERATURE: 98.3 F | BODY MASS INDEX: 30.83 KG/M2

## 2025-05-23 LAB
GLUCOSE BLDC GLUCOMTR-MCNC: 110 MG/DL (ref 70–99)
HGB BLD-MCNC: 12.8 G/DL (ref 13.3–17.7)
MCV RBC AUTO: 96 FL (ref 78–100)

## 2025-05-23 PROCEDURE — 82962 GLUCOSE BLOOD TEST: CPT

## 2025-05-23 PROCEDURE — 97530 THERAPEUTIC ACTIVITIES: CPT | Mod: GP

## 2025-05-23 PROCEDURE — 250N000011 HC RX IP 250 OP 636: Performed by: ORTHOPAEDIC SURGERY

## 2025-05-23 PROCEDURE — 97535 SELF CARE MNGMENT TRAINING: CPT | Mod: GO

## 2025-05-23 PROCEDURE — 250N000013 HC RX MED GY IP 250 OP 250 PS 637: Performed by: ORTHOPAEDIC SURGERY

## 2025-05-23 PROCEDURE — 97116 GAIT TRAINING THERAPY: CPT | Mod: GP

## 2025-05-23 PROCEDURE — 36415 COLL VENOUS BLD VENIPUNCTURE: CPT | Performed by: ORTHOPAEDIC SURGERY

## 2025-05-23 PROCEDURE — 97165 OT EVAL LOW COMPLEX 30 MIN: CPT | Mod: GO

## 2025-05-23 PROCEDURE — 85018 HEMOGLOBIN: CPT | Performed by: ORTHOPAEDIC SURGERY

## 2025-05-23 RX ADMIN — ASPIRIN 325 MG: 325 TABLET, COATED ORAL at 08:08

## 2025-05-23 RX ADMIN — ACETAMINOPHEN 975 MG: 325 TABLET, FILM COATED ORAL at 13:28

## 2025-05-23 RX ADMIN — KETOROLAC TROMETHAMINE 15 MG: 15 INJECTION, SOLUTION INTRAMUSCULAR; INTRAVENOUS at 08:08

## 2025-05-23 RX ADMIN — POLYETHYLENE GLYCOL 3350 17 G: 17 POWDER, FOR SOLUTION ORAL at 08:08

## 2025-05-23 RX ADMIN — CEFAZOLIN SODIUM 2 G: 2 SOLUTION INTRAVENOUS at 01:17

## 2025-05-23 RX ADMIN — KETOROLAC TROMETHAMINE 15 MG: 15 INJECTION, SOLUTION INTRAMUSCULAR; INTRAVENOUS at 02:34

## 2025-05-23 RX ADMIN — OXYCODONE HYDROCHLORIDE 5 MG: 5 TABLET ORAL at 13:29

## 2025-05-23 RX ADMIN — SENNOSIDES AND DOCUSATE SODIUM 1 TABLET: 50; 8.6 TABLET ORAL at 08:08

## 2025-05-23 RX ADMIN — ACETAMINOPHEN 975 MG: 325 TABLET, FILM COATED ORAL at 06:12

## 2025-05-23 ASSESSMENT — ACTIVITIES OF DAILY LIVING (ADL)
ADLS_ACUITY_SCORE: 38
ADLS_ACUITY_SCORE: 36
ADLS_ACUITY_SCORE: 36
ADLS_ACUITY_SCORE: 38
ADLS_ACUITY_SCORE: 36
ADLS_ACUITY_SCORE: 38
ADLS_ACUITY_SCORE: 38
ADLS_ACUITY_SCORE: 36

## 2025-05-23 NOTE — PLAN OF CARE
Physical Therapy Discharge Summary    Reason for therapy discharge:    Discharged to home.    Progress towards therapy goal(s). See goals on Care Plan in [facility] electronic health record for goal details.  Goals partially met.  Barriers to achieving goals:   discharge from facility.    Therapy recommendation(s):    Continue home exercise program.

## 2025-05-23 NOTE — PROGRESS NOTES
Patient vital signs are at baseline: Yes  Patient able to ambulate as they were prior to admission or with assist devices provided by therapies during their stay:  Yes  Patient MUST void prior to discharge:  Yes  Patient able to tolerate oral intake:  Yes  Pain has adequate pain control using Oral analgesics:  Yes  Does patient have an identified :  Yes  Has goal D/C date and time been discussed with patient:  Yes    Dx:Right Hip Total Arthroplasty  POD#1    A&Ox4.   CMS Intact.   VSS on RA.   Up with Ax1 GB and walker.   Voiding in Urinal/BR.   No PIV Removed for Discharge.  Regular Diet.  Pain controlled with Oxycodone, IV Toradol, Tylenol.     Reviewed discharge instructions and medications with patient:YES  Questions answered:YES  Patient discharged to:Home w/ Family  All belongs discharged with patient:YES

## 2025-05-23 NOTE — PROGRESS NOTES
"Orthopedic Surgery  5/23/2025  POD 1    S: Patient voices no complaints today.     O: Blood pressure 119/56, pulse 60, temperature 99.1  F (37.3  C), temperature source Oral, resp. rate 18, height 1.854 m (6' 1\"), weight 105.5 kg (232 lb 9.6 oz), SpO2 93%.  Lab Results   Component Value Date    HGB 13.6 11/08/2024    HGB 17.2 10/17/2019     Neurovascularly intact.  Calves are negative bilaterally, both soft and nontender.  The dressing is C/D/I.    A: Mr. Alba is doing well status post Procedure(s):  RIGHT TOTAL HIP ARTHROPLASTY DIRECT ANTERIOR APPROACH WITH ORTHOGRID.    P: No dressing change, patient to remove outer dressing on POD3, leaving mesh adhesive in place.  Continue physical therapy. Discharge to home today.    Horacio Hernandez  [phone]    "

## 2025-05-23 NOTE — PROGRESS NOTES
05/23/25 0900   Appointment Info   Signing Clinician's Name / Credentials (OT) Shey James, OTD, OTR/L   Rehab Comments (OT) WBAT RLE, no precautions   Quick Adds   Quick Adds Certification   Living Environment   People in Home spouse;child(eleno), adult   Current Living Arrangements house   Home Accessibility stairs to enter home   Number of Stairs, Main Entrance 2   Stair Railings, Main Entrance none   Living Environment Comments Pt lives in a house with his spouse and adult children. Stairs to enter. Pt reports his son will pick him up upon discharge and provide assist as needed. BR set up: walk in shower, has accesss to shower chair if needed, hand held shower head, toilet safety frame   Self-Care   Usual Activity Tolerance good   Current Activity Tolerance moderate   Regular Exercise No   Equipment Currently Used at Home walker, rolling   Fall history within last six months no   Activity/Exercise/Self-Care Comment Pt is Independent with ADLs baseline.   Instrumental Activities of Daily Living (IADL)   IADL Comments Pt can have assist with heavy IADLs from spouse or two adult sons   General Information   Onset of Illness/Injury or Date of Surgery 05/22/25   Referring Physician Horacio Alegria MD   Patient/Family Therapy Goal Statement (OT) To get stronger/to go home   Additional Occupational Profile Info/Pertinent History of Current Problem Pt is a 68 y/o male s/p R RHYS on 5/22/25. Pt is POD#1.   Existing Precautions/Restrictions weight bearing;fall   Right Lower Extremity (Weight-bearing Status) weight-bearing as tolerated (WBAT)   Cognitive Status Examination   Orientation Status orientation to person, place and time   Visual Perception   Visual Impairment/Limitations WFL;corrective lenses for reading   Sensory   Sensory Quick Adds sensation intact   Pain Assessment   Patient Currently in Pain No   Range of Motion Comprehensive   Comment, General Range of Motion BUEs WFL   Strength Comprehensive (MMT)    Comment, General Manual Muscle Testing (MMT) Assessment BUEs WFL   Coordination   Upper Extremity Coordination No deficits were identified   Bed Mobility   Comment (Bed Mobility) NT - pt up in chair at time of OT session   Transfers   Transfers sit-stand transfer;toilet transfer   Sit-Stand Transfer   Sit/Stand Transfer Comments SBA   Toilet Transfer   Toilet Transfer Comments SBA   Balance   Balance Comments No overt LOB noted with use of FWW in room   Activities of Daily Living   BADL Assessment/Intervention upper body dressing;lower body dressing   Upper Body Dressing Assessment/Training   Comment, (Upper Body Dressing) Set up A   Lower Body Dressing Assessment/Training   Comment, (Lower Body Dressing) SBA   Clinical Impression   Criteria for Skilled Therapeutic Interventions Met (OT) Yes, treatment indicated   OT Diagnosis Decreased independence with I/ADLs.   OT Problem List-Impairments impacting ADL problems related to;activity tolerance impaired   Assessment of Occupational Performance 1-3 Performance Deficits   Identified Performance Deficits dressing, bathing, IADLs   Planned Therapy Interventions (OT) ADL retraining;IADL retraining;transfer training   Clinical Decision Making Complexity (OT) problem focused assessment/low complexity   Risk & Benefits of therapy have been explained patient   OT Total Evaluation Time   OT Eval, Low Complexity Minutes (64331) 10   Therapy Certification   Start of Care Date 05/23/25   Certification date from 05/23/25   Certification date to 05/23/25   Medical Diagnosis R RHYS   OT Goals   Therapy Frequency (OT) One time eval and treatment   OT Predicted Duration/Target Date for Goal Attainment 05/22/25   OT Goals Upper Body Dressing;Lower Body Dressing;Toilet Transfer/Toileting   OT: Upper Body Dressing Supervision/stand-by assist;Goal Met   OT: Lower Body Dressing Supervision/stand-by assist;Goal Met  (FWW)   OT: Toilet Transfer/Toileting Supervision/stand-by assist;toilet  transfer  (FWW, goal met)   Self-Care/Home Management   Self-Care/Home Mgmt/ADL, Compensatory, Meal Prep Minutes (14065) 15   Symptoms Noted During/After Treatment (Meal Preparation/Planning Training) none   Treatment Detail/Skilled Intervention Pt greeted in chair, agreeable to OT. Pt educated on WBAT RLE, and no hip precautions. Patient demonstrates sit > stand from EOB with SBA progressing to Mod I, FWW. Patient completes room level functional mobility to/from BR with SBA-Mod I, FWW. Patient completes toilet transfer with SBA-Mod I, FWW. Patient educated on walk in shower transfer using side step technique with hands against wall for safety, educated on stepping in/out leading with the non-surgical leg first - patient demonstrates this with SBA and FWW. Patient mobilizes to chair with SBA, FWW. Patient sits in chair with SBA, FWW. Patient educated on LB dressing strategies for ease of task, including dressing the surgical side first and undressing non-surgical side first. Patient dons pants with SBA-Mod I and FWW. Pt dons sweatshirt with set up A.  Patient educated on fall risk reduction strategies such as removal of throw rugs, loose cords, having good lighting, attach a walker bag to walker for carrying items, and sliding items across the countertop - pt verbalized understanding. Patient up in chair with needs met, RN updated, items in reach.   OT Discharge Planning   OT Plan d/c   OT Discharge Recommendation (DC Rec) other (see comments)  (Defer to Ortho MD)   OT Rationale for DC Rec Patient is functioning near baseline, with noted decr activiy tolerance, impacting overall I/ADL independence. Patient, however, completes functional mobility and self-care tasks in room with FWW and SBA-Mod I. Patient resides with spouse and two adult sons who are able to assist with all I/ADLs as needed. Patient with all AE needs met to complete self-cares. No further acute OT needs at this time. Acute OT to sign off.   OT Brief  overview of current status Goals of therapy will be to address safe mobility and make recs for d/c to next level of care. Pt and RN will continue to follow all falls risk precautions as documented by RN staff while hospitalized.  (SBA-Mod I FWW/dressing in room)   OT Total Distance Amb During Session (feet) 24   Total Session Time   Timed Code Treatment Minutes 15   Total Session Time (sum of timed and untimed services) 25   [facility]                                                                                   OUTPATIENT OCCUPATIONAL THERAPY    PLAN OF TREATMENT FOR OUTPATIENT REHABILITATION   Patient's Last Name, First Name, Castillo Burnett YOB: 1955   Provider's Name   [facility]   Medical Record No.  [id number]     Onset Date: 05/22/25 Start of Care Date: 05/23/25     Medical Diagnosis:  R RHYS               OT Diagnosis:  Decreased independence with I/ADLs. Certification Dates:  From: 05/23/25  To: 05/23/25     See note for plan of treatment, functional goals, and certification details.    I CERTIFY THE NEED FOR THESE SERVICES FURNISHED UNDER        THIS PLAN OF TREATMENT AND WHILE UNDER MY CARE (Physician co-signature of this document indicates review and certification of the therapy plan).

## 2025-05-23 NOTE — PLAN OF CARE
Occupational Therapy Discharge Summary    Reason for therapy discharge:    All goals and outcomes met, no further needs identified.    Progress towards therapy goal(s). See goals on Care Plan in Epic electronic health record for goal details.  Goals met    Therapy recommendation(s):    Patient is functioning near baseline, with noted decr activiy tolerance, impacting overall I/ADL independence. Patient, however, completes functional mobility and self-care tasks in room with FWW and SBA-Mod I. Patient resides with spouse and two adult sons who are able to assist with all I/ADLs as needed. Patient with all AE needs met to complete self-cares. No further acute OT needs at this time. Acute OT to sign off.

## 2025-05-23 NOTE — PLAN OF CARE
Summary: POD#1 R Hip Arthroplasty   Date: 05/22/2025-05/23/2025     Orientation/Cognitive: A&O x4   Mobility Level/Assist Equipment: Ax 1 GB /W  Fall Risk (Y/N): Y  Behavior Concerns: Green  Pain Management: scheduled ketorolac, PRN oxy x1   Tele/VS/O2: RA, VSS  ABNL Lab/BG: see chart   Diet: regular   Bowel/Bladder: Cont, uses urinal, small BM today   Skin Concerns: R hip incision - CDI  Drains/Devices: LR 100ml/hr   Tests/Procedures for next shift:   Anticipated DC date & active delays: pending discharge today

## (undated) DEVICE — DAVINCI XI DRAPE ARM 470015

## (undated) DEVICE — DAVINCI XI NDL DRIVER LARGE 470006

## (undated) DEVICE — KIT SIGMOIDOSCOPE ENDOSCOPIC LIGHTED 18FR KI613/10

## (undated) DEVICE — SOL NACL 0.9% IRRIG 1000ML BOTTLE 2F7124

## (undated) DEVICE — DRAPE IOBAN INCISE 23X17" 6650EZ

## (undated) DEVICE — TUBING CONMED AIRSEAL SMOKE EVAC INSUFFLATION ASM-EVAC

## (undated) DEVICE — GLOVE BIOGEL PI MICRO INDICATOR UNDERGLOVE SZ 6.5 48965

## (undated) DEVICE — STPL ECHELON CONTOUR CUTTER CVD 40MM GREEN GCS40G

## (undated) DEVICE — SU PDS II 0 CTX 60" Z990G

## (undated) DEVICE — DAVINCI XI OBTURATOR BLADELESS 8MM 470359

## (undated) DEVICE — SU PDS II 0 CT 36" Z358T

## (undated) DEVICE — STPL CIRCULAR CURVED XLONG  29MM  ECS29B

## (undated) DEVICE — Device

## (undated) DEVICE — DAVINCI XI GRASPER FENESTRATED TIP UP 8MM 470347

## (undated) DEVICE — LUBRICANT INST KIT ENDO-LUBE 220-90

## (undated) DEVICE — NDL 19GA 1.5"

## (undated) DEVICE — CATH TRAY FOLEY COUDE SURESTEP 16FR W/URNE MTR STLK A304716A

## (undated) DEVICE — PREP CHLORAPREP 26ML TINTED HI-LITE ORANGE 930815

## (undated) DEVICE — SPONGE LAP 18X18" X8435

## (undated) DEVICE — KIT PATIENT CARE HANA TABLE PROFX SUPINE 6855

## (undated) DEVICE — DAVINCI XI DRAPE COLUMN 470341

## (undated) DEVICE — SU MONOCRYL 4-0 PS-2 18" UND Y496G

## (undated) DEVICE — ESU LIGASURE IMPACT OPEN SEALER/DVDR CVD LG JAW LF4418

## (undated) DEVICE — GLOVE PROTEXIS POWDER FREE 8.5 ORTHOPEDIC 2D73ET85

## (undated) DEVICE — SYR BULB IRRIG 50ML LATEX FREE 0035280

## (undated) DEVICE — SU VICRYL 3-0 TIE 12X18" J904T

## (undated) DEVICE — KIT PATIENT POSITIONING PIGAZZI LATEX FREE 40580

## (undated) DEVICE — DAVINCI XI SEAL UNIVERSAL 5-12MM 470500

## (undated) DEVICE — DRAPE BREAST/CHEST 29420

## (undated) DEVICE — SU VICRYL+ 2-0 12X18IN VIO VCP105G

## (undated) DEVICE — KIT TURNOVER FAIRVIEW SOUTHDALE FULL SP3889

## (undated) DEVICE — ESU GROUND PAD UNIVERSAL W/O CORD

## (undated) DEVICE — SU PROLENE 2-0 SHDA 48" 8533H

## (undated) DEVICE — ESU ELEC BLADE 6" COATED/INSULATED E1455-6

## (undated) DEVICE — SUIT TOGA T7PLUS XL ZIPPER PEEL AWAY 0416-831-100

## (undated) DEVICE — STPL CIRCULAR DST 28MM W/TILT TIP EEA28

## (undated) DEVICE — TAPE CLOTH ADHESIVE 3" LATEX FREE

## (undated) DEVICE — SUCTION TIP YANKAUER STR K87

## (undated) DEVICE — SUCTION IRR STRYKERFLOW II W/TIP 250-070-520

## (undated) DEVICE — SOL NACL 0.9% INJ 1000ML BAG 2B1324X

## (undated) DEVICE — SU VICRYL+ 2-0 27IN CP-1 UND VCP266H

## (undated) DEVICE — DRAIN JACKSON PRATT RESERVOIR 100ML SU130-1305

## (undated) DEVICE — BAG DRAIN URO FOR SIEMENS 8MM ADAPTER NS CC164NS-A

## (undated) DEVICE — PACK MAJOR SBA15MAFSI

## (undated) DEVICE — SU VICRYL+ 0 CTX 18IN VIO VCP764D

## (undated) DEVICE — SOL WATER IRRIG 1000ML BOTTLE 2F7114

## (undated) DEVICE — ESU PENCIL W/SMOKE EVAC NEPTUNE STRYKER 0703-046-000

## (undated) DEVICE — SU VICRYL 2-0 SH 27" J317H

## (undated) DEVICE — DECANTER BAG 2002S

## (undated) DEVICE — SYR BULB IRRIG DOVER 60 ML LATEX FREE 67000

## (undated) DEVICE — PACK TUR CUSTOM SBA15RUFSE

## (undated) DEVICE — NDL INSUFFLATION 13GA 120MM C2201

## (undated) DEVICE — CLEANER INST PRE-KLENZ SOAK SHIELD TUBE 6 ML MEDIUM 2D66J4

## (undated) DEVICE — DRAIN JACKSON PRATT CHANNEL 19FR ROUND HUBLESS SIL JP-2230

## (undated) DEVICE — GLOVE BIOGEL PI MICRO SZ 6.0 48560

## (undated) DEVICE — ANTIFOG SOLUTION SEE SHARP 150M TROCAR SWABS 30978

## (undated) DEVICE — DRAPE IOBAN ISOLATION VERTICAL 320X21CM 6617

## (undated) DEVICE — STPL RELOAD ECHELON CONTOUR CVD 40MM GREEN GCR40G

## (undated) DEVICE — DAVINCI HOT SHEARS TIP COVER  400180

## (undated) DEVICE — PACK LAP CHOLE SLC15LCFSD

## (undated) DEVICE — CATH TRAY FOLEY COUDE SURESTEP 16FR W/DRN BAG LATEX A304416A

## (undated) DEVICE — BLADE KNIFE SURG 10 371110

## (undated) DEVICE — DRAPE LEGGINGS 8421

## (undated) DEVICE — TUBING CYSTO/BLADDER IRRIG SET 80" 06544-01

## (undated) DEVICE — BONE CLEANING TIP INTERPULSE  0210-010-000

## (undated) DEVICE — SOL WATER IRRIG 3000ML BAG 2B7117

## (undated) DEVICE — SUCTION MANIFOLD NEPTUNE 2 SYS 4 PORT 0702-020-000

## (undated) DEVICE — CLEANSER WOUND IRRISEPT 0.05% CHG IRRISEPT-403

## (undated) DEVICE — SU DERMABOND ADVANCED .7ML DNX12

## (undated) DEVICE — CATH MALECOT 28FR LATEX 86028

## (undated) DEVICE — BLADE SAW SAGITTAL STRK 18X90X1.27MM HD SYS 6 6118-127-090

## (undated) DEVICE — SU STRATAFIX PDS PLUS 0 CT 45CM SXPP1A406

## (undated) DEVICE — WIPES FOLEY CARE SURESTEP PROVON DFC100

## (undated) DEVICE — SU ETHIBOND 2 V-37 4X30" MX69G

## (undated) DEVICE — SU MONOCRYL 3-0 PS-2 27" Y427H

## (undated) DEVICE — ESU LIGASURE LAPAROSCOPIC BLUNT TIP SEALER 5MMX37CM LF1837

## (undated) DEVICE — SU VICRYL 0 TIE 12X18" J906G

## (undated) DEVICE — SYR 50ML LL W/O NDL 309653

## (undated) DEVICE — CLOSURE SYS SKIN PREMIERPRO EXOFIN FUSION 4X22CM STRL 3472

## (undated) DEVICE — SUCTION TIP POOLE K770

## (undated) DEVICE — DAVINCI XI REDUCER 8-12MM 470381

## (undated) DEVICE — ANTIFOG SOLUTION SEE SHARP 150M TROCAR SWABS 30978 (COI)

## (undated) DEVICE — SURGICEL POWDER ABSORBABLE HEMOSTAT 3GM 3013SP

## (undated) DEVICE — DRAPE C-ARM 60X42" 1013

## (undated) DEVICE — LINEN TOWEL PACK X5 5464

## (undated) DEVICE — SU TIE VICRYL+ 3-0 12X18IN VIO VCP104G

## (undated) DEVICE — ENDO ACCESS PLATFORM GELPOINT SGL INCISION CNGL2

## (undated) DEVICE — DRAPE CONVERTORS U-DRAPE 60X72" 8476

## (undated) DEVICE — DAVINCI XI HANDPIECE ESU VESSEL SEALER 8MM EXT 480422

## (undated) DEVICE — SU DERMABOND PRINEO 22CM CLR222US

## (undated) DEVICE — STPL DAVINCI SUREFORM 45MM STR TIPRELOAD 12FIRE 480445

## (undated) DEVICE — TUBING SUCTION MEDI-VAC SOFT 3/16"X20' N520A

## (undated) DEVICE — EVAC SYSTEM CLEAR FLOW SC082500

## (undated) DEVICE — ENDO TROCAR CONMED AIRSEAL BLADELESS 05X120MM IAS5-120LP

## (undated) DEVICE — DRAPE GYN/UROLOGY FLUID POUCH TUR 29455

## (undated) DEVICE — TUBING SUCTION 12"X1/4" N612

## (undated) DEVICE — SOL NACL 0.9% INJ 250ML BAG 2B1322Q

## (undated) DEVICE — STPL DAVINCI SUREFORM 45MM RELOAD GREEN 48345G

## (undated) DEVICE — SU STRATAFIX PDS PLUS 2-0 SPIRAL CT-1 30CM SXPP1B410

## (undated) DEVICE — HOOD SURG T7PLUS PEEL AWAY FACE SHIELD STRL LF 0416-801-100

## (undated) DEVICE — DRAPE SHEET REV FOLD 3/4 9349

## (undated) DEVICE — DRAPE STERI U 1015

## (undated) DEVICE — APPLICATOR ENDOSCOPIC 5 SURGICEL POWDER 3123SPEA

## (undated) DEVICE — ESU BIPOLAR SEALER AQUAMANTYS 6MM 23-112-1

## (undated) DEVICE — DAVINCI XI FCP BIPOLAR FENESTRATED 470205

## (undated) DEVICE — SU SILK 2-0 FSL 18" 677G

## (undated) DEVICE — SUCTION IRR SYSTEM W/O TIP INTERPULSE HANDPIECE 0210-100-000

## (undated) DEVICE — GOWN IMPERVIOUS SPECIALTY XL/XLONG 39049

## (undated) DEVICE — PACK TOTAL HIP W/U DRAPE SOP15HUFSC

## (undated) DEVICE — PAD CHUX UNDERPAD 23X24" 7136

## (undated) RX ORDER — FENTANYL CITRATE 50 UG/ML
INJECTION, SOLUTION INTRAMUSCULAR; INTRAVENOUS
Status: DISPENSED
Start: 2024-04-16

## (undated) RX ORDER — HYDROMORPHONE HCL IN WATER/PF 6 MG/30 ML
PATIENT CONTROLLED ANALGESIA SYRINGE INTRAVENOUS
Status: DISPENSED
Start: 2025-05-22

## (undated) RX ORDER — FLUMAZENIL 0.1 MG/ML
INJECTION, SOLUTION INTRAVENOUS
Status: DISPENSED
Start: 2024-02-19

## (undated) RX ORDER — FENTANYL CITRATE 0.05 MG/ML
INJECTION, SOLUTION INTRAMUSCULAR; INTRAVENOUS
Status: DISPENSED
Start: 2024-04-17

## (undated) RX ORDER — PROPOFOL 10 MG/ML
INJECTION, EMULSION INTRAVENOUS
Status: DISPENSED
Start: 2024-04-17

## (undated) RX ORDER — FENTANYL CITRATE 50 UG/ML
INJECTION, SOLUTION INTRAMUSCULAR; INTRAVENOUS
Status: DISPENSED
Start: 2025-05-22

## (undated) RX ORDER — MEPERIDINE HYDROCHLORIDE 25 MG/ML
INJECTION INTRAMUSCULAR; INTRAVENOUS; SUBCUTANEOUS
Status: DISPENSED
Start: 2024-04-17

## (undated) RX ORDER — FENTANYL CITRATE 0.05 MG/ML
INJECTION, SOLUTION INTRAMUSCULAR; INTRAVENOUS
Status: DISPENSED
Start: 2024-04-24

## (undated) RX ORDER — HYDROMORPHONE HCL IN WATER/PF 6 MG/30 ML
PATIENT CONTROLLED ANALGESIA SYRINGE INTRAVENOUS
Status: DISPENSED
Start: 2024-04-17

## (undated) RX ORDER — HYDROMORPHONE HYDROCHLORIDE 1 MG/ML
INJECTION, SOLUTION INTRAMUSCULAR; INTRAVENOUS; SUBCUTANEOUS
Status: DISPENSED
Start: 2024-11-06

## (undated) RX ORDER — PROPOFOL 10 MG/ML
INJECTION, EMULSION INTRAVENOUS
Status: DISPENSED
Start: 2024-04-24

## (undated) RX ORDER — VECURONIUM BROMIDE 1 MG/ML
INJECTION, POWDER, LYOPHILIZED, FOR SOLUTION INTRAVENOUS
Status: DISPENSED
Start: 2024-11-06

## (undated) RX ORDER — PROPOFOL 10 MG/ML
INJECTION, EMULSION INTRAVENOUS
Status: DISPENSED
Start: 2024-11-06

## (undated) RX ORDER — METRONIDAZOLE 500 MG/100ML
INJECTION, SOLUTION INTRAVENOUS
Status: DISPENSED
Start: 2024-04-17

## (undated) RX ORDER — FENTANYL CITRATE 0.05 MG/ML
INJECTION, SOLUTION INTRAMUSCULAR; INTRAVENOUS
Status: DISPENSED
Start: 2025-05-22

## (undated) RX ORDER — CEFAZOLIN SODIUM/WATER 2 G/20 ML
SYRINGE (ML) INTRAVENOUS
Status: DISPENSED
Start: 2025-05-22

## (undated) RX ORDER — HYDROMORPHONE HCL IN WATER/PF 6 MG/30 ML
PATIENT CONTROLLED ANALGESIA SYRINGE INTRAVENOUS
Status: DISPENSED
Start: 2024-04-24

## (undated) RX ORDER — HYDROMORPHONE HCL IN WATER/PF 6 MG/30 ML
PATIENT CONTROLLED ANALGESIA SYRINGE INTRAVENOUS
Status: DISPENSED
Start: 2024-11-06

## (undated) RX ORDER — VANCOMYCIN HYDROCHLORIDE 1 G/20ML
INJECTION, POWDER, LYOPHILIZED, FOR SOLUTION INTRAVENOUS
Status: DISPENSED
Start: 2025-05-22

## (undated) RX ORDER — EPHEDRINE SULFATE 50 MG/ML
INJECTION, SOLUTION INTRAMUSCULAR; INTRAVENOUS; SUBCUTANEOUS
Status: DISPENSED
Start: 2025-05-22

## (undated) RX ORDER — ONDANSETRON 2 MG/ML
INJECTION INTRAMUSCULAR; INTRAVENOUS
Status: DISPENSED
Start: 2025-05-22

## (undated) RX ORDER — DEXAMETHASONE SODIUM PHOSPHATE 4 MG/ML
INJECTION, SOLUTION INTRA-ARTICULAR; INTRALESIONAL; INTRAMUSCULAR; INTRAVENOUS; SOFT TISSUE
Status: DISPENSED
Start: 2025-05-22

## (undated) RX ORDER — ONDANSETRON 2 MG/ML
INJECTION INTRAMUSCULAR; INTRAVENOUS
Status: DISPENSED
Start: 2024-04-17

## (undated) RX ORDER — GLYCOPYRROLATE 0.2 MG/ML
INJECTION, SOLUTION INTRAMUSCULAR; INTRAVENOUS
Status: DISPENSED
Start: 2024-04-24

## (undated) RX ORDER — PREGABALIN 150 MG/1
CAPSULE ORAL
Status: DISPENSED
Start: 2025-05-22

## (undated) RX ORDER — METRONIDAZOLE 500 MG/100ML
INJECTION, SOLUTION INTRAVENOUS
Status: DISPENSED
Start: 2024-11-06

## (undated) RX ORDER — KETOROLAC TROMETHAMINE 15 MG/ML
INJECTION, SOLUTION INTRAMUSCULAR; INTRAVENOUS
Status: DISPENSED
Start: 2024-04-17

## (undated) RX ORDER — FENTANYL CITRATE 50 UG/ML
INJECTION, SOLUTION INTRAMUSCULAR; INTRAVENOUS
Status: DISPENSED
Start: 2024-04-17

## (undated) RX ORDER — ONDANSETRON 2 MG/ML
INJECTION INTRAMUSCULAR; INTRAVENOUS
Status: DISPENSED
Start: 2024-11-06

## (undated) RX ORDER — FENTANYL CITRATE 50 UG/ML
INJECTION, SOLUTION INTRAMUSCULAR; INTRAVENOUS
Status: DISPENSED
Start: 2024-02-19

## (undated) RX ORDER — EPHEDRINE SULFATE 50 MG/ML
INJECTION, SOLUTION INTRAMUSCULAR; INTRAVENOUS; SUBCUTANEOUS
Status: DISPENSED
Start: 2024-11-06

## (undated) RX ORDER — ACETAMINOPHEN 325 MG/1
TABLET ORAL
Status: DISPENSED
Start: 2024-04-17

## (undated) RX ORDER — DEXAMETHASONE SODIUM PHOSPHATE 4 MG/ML
INJECTION, SOLUTION INTRA-ARTICULAR; INTRALESIONAL; INTRAMUSCULAR; INTRAVENOUS; SOFT TISSUE
Status: DISPENSED
Start: 2024-11-06

## (undated) RX ORDER — CEFAZOLIN SODIUM/WATER 2 G/20 ML
SYRINGE (ML) INTRAVENOUS
Status: DISPENSED
Start: 2024-04-24

## (undated) RX ORDER — CEFAZOLIN SODIUM 1 G/3ML
INJECTION, POWDER, FOR SOLUTION INTRAMUSCULAR; INTRAVENOUS
Status: DISPENSED
Start: 2024-04-17

## (undated) RX ORDER — VECURONIUM BROMIDE 1 MG/ML
INJECTION, POWDER, LYOPHILIZED, FOR SOLUTION INTRAVENOUS
Status: DISPENSED
Start: 2024-04-17

## (undated) RX ORDER — HYDROMORPHONE HYDROCHLORIDE 1 MG/ML
INJECTION, SOLUTION INTRAMUSCULAR; INTRAVENOUS; SUBCUTANEOUS
Status: DISPENSED
Start: 2025-05-22

## (undated) RX ORDER — ONDANSETRON 2 MG/ML
INJECTION INTRAMUSCULAR; INTRAVENOUS
Status: DISPENSED
Start: 2024-04-24

## (undated) RX ORDER — HEPARIN SODIUM 5000 [USP'U]/.5ML
INJECTION, SOLUTION INTRAVENOUS; SUBCUTANEOUS
Status: DISPENSED
Start: 2024-11-06

## (undated) RX ORDER — BUPIVACAINE HYDROCHLORIDE 5 MG/ML
INJECTION, SOLUTION EPIDURAL; INTRACAUDAL
Status: DISPENSED
Start: 2024-04-24

## (undated) RX ORDER — NALOXONE HYDROCHLORIDE 0.4 MG/ML
INJECTION, SOLUTION INTRAMUSCULAR; INTRAVENOUS; SUBCUTANEOUS
Status: DISPENSED
Start: 2024-02-19

## (undated) RX ORDER — FENTANYL CITRATE 0.05 MG/ML
INJECTION, SOLUTION INTRAMUSCULAR; INTRAVENOUS
Status: DISPENSED
Start: 2024-11-06

## (undated) RX ORDER — BUPIVACAINE HYDROCHLORIDE 5 MG/ML
INJECTION, SOLUTION EPIDURAL; INTRACAUDAL
Status: DISPENSED
Start: 2024-11-06

## (undated) RX ORDER — DEXAMETHASONE SODIUM PHOSPHATE 4 MG/ML
INJECTION, SOLUTION INTRA-ARTICULAR; INTRALESIONAL; INTRAMUSCULAR; INTRAVENOUS; SOFT TISSUE
Status: DISPENSED
Start: 2024-04-24

## (undated) RX ORDER — CEFAZOLIN SODIUM/WATER 2 G/20 ML
SYRINGE (ML) INTRAVENOUS
Status: DISPENSED
Start: 2024-11-06

## (undated) RX ORDER — EPHEDRINE SULFATE 50 MG/ML
INJECTION, SOLUTION INTRAMUSCULAR; INTRAVENOUS; SUBCUTANEOUS
Status: DISPENSED
Start: 2024-04-24

## (undated) RX ORDER — FENTANYL CITRATE 50 UG/ML
INJECTION, SOLUTION INTRAMUSCULAR; INTRAVENOUS
Status: DISPENSED
Start: 2024-04-24

## (undated) RX ORDER — ACETAMINOPHEN 325 MG/1
TABLET ORAL
Status: DISPENSED
Start: 2024-11-06

## (undated) RX ORDER — HYDROMORPHONE HYDROCHLORIDE 1 MG/ML
INJECTION, SOLUTION INTRAMUSCULAR; INTRAVENOUS; SUBCUTANEOUS
Status: DISPENSED
Start: 2024-04-24

## (undated) RX ORDER — FENTANYL CITRATE 50 UG/ML
INJECTION, SOLUTION INTRAMUSCULAR; INTRAVENOUS
Status: DISPENSED
Start: 2024-11-06

## (undated) RX ORDER — HYDROMORPHONE HYDROCHLORIDE 1 MG/ML
INJECTION, SOLUTION INTRAMUSCULAR; INTRAVENOUS; SUBCUTANEOUS
Status: DISPENSED
Start: 2024-04-17

## (undated) RX ORDER — HYDROXYZINE HYDROCHLORIDE 50 MG/ML
INJECTION, SOLUTION INTRAMUSCULAR
Status: DISPENSED
Start: 2024-11-06

## (undated) RX ORDER — TRANEXAMIC ACID 650 MG/1
TABLET ORAL
Status: DISPENSED
Start: 2025-05-22